# Patient Record
Sex: MALE | Race: WHITE | NOT HISPANIC OR LATINO | Employment: UNEMPLOYED | URBAN - METROPOLITAN AREA
[De-identification: names, ages, dates, MRNs, and addresses within clinical notes are randomized per-mention and may not be internally consistent; named-entity substitution may affect disease eponyms.]

---

## 2018-03-23 ENCOUNTER — HOSPITAL ENCOUNTER (OUTPATIENT)
Dept: SLEEP MEDICINE | Facility: HOSPITAL | Age: 63
Discharge: HOME | End: 2018-03-23
Attending: FAMILY MEDICINE
Payer: COMMERCIAL

## 2018-03-23 DIAGNOSIS — G47.33 OBSTRUCTIVE SLEEP APNEA (ADULT) (PEDIATRIC): ICD-10-CM

## 2018-03-23 PROCEDURE — 95806 SLEEP STUDY UNATT&RESP EFFT: CPT

## 2018-11-22 ENCOUNTER — HOSPITAL ENCOUNTER (OUTPATIENT)
Facility: HOSPITAL | Age: 63
Discharge: HOME | End: 2018-11-23
Attending: HOSPITALIST | Admitting: HOSPITALIST
Payer: COMMERCIAL

## 2018-11-22 ENCOUNTER — HOSPITAL ENCOUNTER (EMERGENCY)
Facility: HOSPITAL | Age: 63
Discharge: ANOTHER HOSPITAL | End: 2018-11-22
Attending: SURGERY
Payer: COMMERCIAL

## 2018-11-22 VITALS
BODY MASS INDEX: 37.76 KG/M2 | DIASTOLIC BLOOD PRESSURE: 86 MMHG | HEIGHT: 71 IN | SYSTOLIC BLOOD PRESSURE: 155 MMHG | RESPIRATION RATE: 23 BRPM | WEIGHT: 269.69 LBS | TEMPERATURE: 97 F | OXYGEN SATURATION: 96 % | HEART RATE: 99 BPM

## 2018-11-22 DIAGNOSIS — Z71.89 GOALS OF CARE, COUNSELING/DISCUSSION: ICD-10-CM

## 2018-11-22 DIAGNOSIS — Z71.89 COUNSELING REGARDING ADVANCED CARE PLANNING AND GOALS OF CARE: ICD-10-CM

## 2018-11-22 DIAGNOSIS — G45.9 TIA (TRANSIENT ISCHEMIC ATTACK): ICD-10-CM

## 2018-11-22 DIAGNOSIS — Z51.5 PALLIATIVE CARE ENCOUNTER: ICD-10-CM

## 2018-11-22 DIAGNOSIS — R47.1 DYSARTHRIA: ICD-10-CM

## 2018-11-22 DIAGNOSIS — R22.1 NECK MASS: ICD-10-CM

## 2018-11-22 DIAGNOSIS — E11.9 TYPE 2 DIABETES MELLITUS WITHOUT COMPLICATION, WITHOUT LONG-TERM CURRENT USE OF INSULIN: Primary | ICD-10-CM

## 2018-11-22 DIAGNOSIS — I67.1 ANTERIOR COMMUNICATING ARTERY ANEURYSM: ICD-10-CM

## 2018-11-22 PROBLEM — G45.1 TIA INVOLVING CAROTID ARTERY: Status: ACTIVE | Noted: 2018-11-22

## 2018-11-22 LAB
ALBUMIN SERPL BCP-MCNC: 4.2 G/DL
ALP SERPL-CCNC: 92 U/L
ALT SERPL W/O P-5'-P-CCNC: 47 U/L
ANION GAP SERPL CALC-SCNC: 15 MMOL/L
APTT BLDCRRT: 24.1 SEC
AST SERPL-CCNC: 30 U/L
BASOPHILS # BLD AUTO: 0.03 K/UL
BASOPHILS NFR BLD: 0.3 %
BILIRUB SERPL-MCNC: 1.9 MG/DL
BNP SERPL-MCNC: <10 PG/ML
BUN SERPL-MCNC: 21 MG/DL
CALCIUM SERPL-MCNC: 9.8 MG/DL
CHLORIDE SERPL-SCNC: 104 MMOL/L
CK MB SERPL-MCNC: 0.7 NG/ML
CK MB SERPL-RTO: 1.1 %
CK SERPL-CCNC: 63 U/L
CK SERPL-CCNC: 63 U/L
CO2 SERPL-SCNC: 24 MMOL/L
CREAT SERPL-MCNC: 1.4 MG/DL
D DIMER PPP IA.FEU-MCNC: 0.26 MG/L FEU
DIFFERENTIAL METHOD: ABNORMAL
EOSINOPHIL # BLD AUTO: 0.3 K/UL
EOSINOPHIL NFR BLD: 3.6 %
ERYTHROCYTE [DISTWIDTH] IN BLOOD BY AUTOMATED COUNT: 13.1 %
EST. GFR  (AFRICAN AMERICAN): >60 ML/MIN/1.73 M^2
EST. GFR  (NON AFRICAN AMERICAN): 54 ML/MIN/1.73 M^2
GLUCOSE SERPL-MCNC: 247 MG/DL
HCT VFR BLD AUTO: 41.8 %
HGB BLD-MCNC: 14.2 G/DL
INR PPP: 0.9
LYMPHOCYTES # BLD AUTO: 1.9 K/UL
LYMPHOCYTES NFR BLD: 21.1 %
MAGNESIUM SERPL-MCNC: 1.7 MG/DL
MCH RBC QN AUTO: 31.3 PG
MCHC RBC AUTO-ENTMCNC: 34 G/DL
MCV RBC AUTO: 92 FL
MONOCYTES # BLD AUTO: 0.8 K/UL
MONOCYTES NFR BLD: 8.9 %
NEUTROPHILS # BLD AUTO: 5.9 K/UL
NEUTROPHILS NFR BLD: 66.1 %
PHOSPHATE SERPL-MCNC: 3.1 MG/DL
PLATELET # BLD AUTO: 233 K/UL
PMV BLD AUTO: 9.8 FL
POTASSIUM SERPL-SCNC: 4 MMOL/L
PROT SERPL-MCNC: 7.2 G/DL
PROTHROMBIN TIME: 9.7 SEC
RBC # BLD AUTO: 4.53 M/UL
SODIUM SERPL-SCNC: 143 MMOL/L
TROPONIN I SERPL DL<=0.01 NG/ML-MCNC: <0.006 NG/ML
TSH SERPL DL<=0.005 MIU/L-ACNC: 2.75 UIU/ML
WBC # BLD AUTO: 8.87 K/UL

## 2018-11-22 PROCEDURE — 82553 CREATINE MB FRACTION: CPT

## 2018-11-22 PROCEDURE — 93010 ELECTROCARDIOGRAM REPORT: CPT | Mod: ,,, | Performed by: INTERNAL MEDICINE

## 2018-11-22 PROCEDURE — 63600175 PHARM REV CODE 636 W HCPCS: Performed by: SURGERY

## 2018-11-22 PROCEDURE — G0378 HOSPITAL OBSERVATION PER HR: HCPCS

## 2018-11-22 PROCEDURE — 84484 ASSAY OF TROPONIN QUANT: CPT

## 2018-11-22 PROCEDURE — 85025 COMPLETE CBC W/AUTO DIFF WBC: CPT

## 2018-11-22 PROCEDURE — 85730 THROMBOPLASTIN TIME PARTIAL: CPT

## 2018-11-22 PROCEDURE — 84443 ASSAY THYROID STIM HORMONE: CPT

## 2018-11-22 PROCEDURE — 80053 COMPREHEN METABOLIC PANEL: CPT

## 2018-11-22 PROCEDURE — 93005 ELECTROCARDIOGRAM TRACING: CPT

## 2018-11-22 PROCEDURE — 84100 ASSAY OF PHOSPHORUS: CPT

## 2018-11-22 PROCEDURE — 82550 ASSAY OF CK (CPK): CPT

## 2018-11-22 PROCEDURE — 96372 THER/PROPH/DIAG INJ SC/IM: CPT

## 2018-11-22 PROCEDURE — 85610 PROTHROMBIN TIME: CPT

## 2018-11-22 PROCEDURE — 83880 ASSAY OF NATRIURETIC PEPTIDE: CPT

## 2018-11-22 PROCEDURE — 99285 EMERGENCY DEPT VISIT HI MDM: CPT | Mod: 25

## 2018-11-22 PROCEDURE — G0426 INPT/ED TELECONSULT50: HCPCS | Mod: GT,,, | Performed by: PSYCHIATRY & NEUROLOGY

## 2018-11-22 PROCEDURE — 83735 ASSAY OF MAGNESIUM: CPT

## 2018-11-22 PROCEDURE — 85379 FIBRIN DEGRADATION QUANT: CPT

## 2018-11-22 PROCEDURE — 25000003 PHARM REV CODE 250: Performed by: SURGERY

## 2018-11-22 PROCEDURE — G0379 DIRECT REFER HOSPITAL OBSERV: HCPCS

## 2018-11-22 PROCEDURE — 36415 COLL VENOUS BLD VENIPUNCTURE: CPT

## 2018-11-22 RX ORDER — ENOXAPARIN SODIUM 100 MG/ML
40 INJECTION SUBCUTANEOUS
Status: COMPLETED | OUTPATIENT
Start: 2018-11-22 | End: 2018-11-22

## 2018-11-22 RX ORDER — ATORVASTATIN CALCIUM 40 MG/1
80 TABLET, FILM COATED ORAL
Status: COMPLETED | OUTPATIENT
Start: 2018-11-22 | End: 2018-11-22

## 2018-11-22 RX ORDER — CLOPIDOGREL 300 MG/1
300 TABLET, FILM COATED ORAL
Status: COMPLETED | OUTPATIENT
Start: 2018-11-22 | End: 2018-11-22

## 2018-11-22 RX ORDER — ACETAMINOPHEN 500 MG
1000 TABLET ORAL
Status: COMPLETED | OUTPATIENT
Start: 2018-11-22 | End: 2018-11-22

## 2018-11-22 RX ADMIN — ACETAMINOPHEN 1000 MG: 500 TABLET, FILM COATED ORAL at 09:11

## 2018-11-22 RX ADMIN — ENOXAPARIN SODIUM 40 MG: 100 INJECTION SUBCUTANEOUS at 10:11

## 2018-11-22 RX ADMIN — CLOPIDOGREL BISULFATE 300 MG: 300 TABLET, FILM COATED ORAL at 10:11

## 2018-11-22 RX ADMIN — ATORVASTATIN CALCIUM 80 MG: 40 TABLET, FILM COATED ORAL at 10:11

## 2018-11-23 VITALS
BODY MASS INDEX: 35.93 KG/M2 | SYSTOLIC BLOOD PRESSURE: 136 MMHG | TEMPERATURE: 96 F | DIASTOLIC BLOOD PRESSURE: 74 MMHG | HEART RATE: 84 BPM | RESPIRATION RATE: 18 BRPM | OXYGEN SATURATION: 96 % | WEIGHT: 256.63 LBS | HEIGHT: 71 IN

## 2018-11-23 PROBLEM — Z71.89 GOALS OF CARE, COUNSELING/DISCUSSION: Status: ACTIVE | Noted: 2018-11-23

## 2018-11-23 PROBLEM — I25.10 CAD (CORONARY ARTERY DISEASE): Status: ACTIVE | Noted: 2018-11-23

## 2018-11-23 PROBLEM — G45.9 TIA (TRANSIENT ISCHEMIC ATTACK): Status: RESOLVED | Noted: 2018-11-23 | Resolved: 2018-11-23

## 2018-11-23 PROBLEM — I67.1 ANTERIOR COMMUNICATING ARTERY ANEURYSM: Status: ACTIVE | Noted: 2018-11-23

## 2018-11-23 PROBLEM — Z51.5 PALLIATIVE CARE ENCOUNTER: Status: RESOLVED | Noted: 2018-11-23 | Resolved: 2018-11-23

## 2018-11-23 PROBLEM — R22.1 NECK MASS: Status: ACTIVE | Noted: 2018-11-23

## 2018-11-23 PROBLEM — Z71.89 GOALS OF CARE, COUNSELING/DISCUSSION: Status: RESOLVED | Noted: 2018-11-23 | Resolved: 2018-11-23

## 2018-11-23 PROBLEM — Z71.89 COUNSELING REGARDING ADVANCED CARE PLANNING AND GOALS OF CARE: Status: ACTIVE | Noted: 2018-11-23

## 2018-11-23 PROBLEM — G45.9 TIA (TRANSIENT ISCHEMIC ATTACK): Status: ACTIVE | Noted: 2018-11-23

## 2018-11-23 PROBLEM — Z51.5 PALLIATIVE CARE ENCOUNTER: Status: ACTIVE | Noted: 2018-11-23

## 2018-11-23 PROBLEM — Z71.89 COUNSELING REGARDING ADVANCED CARE PLANNING AND GOALS OF CARE: Status: RESOLVED | Noted: 2018-11-23 | Resolved: 2018-11-23

## 2018-11-23 PROBLEM — E11.9 TYPE 2 DIABETES MELLITUS, WITHOUT LONG-TERM CURRENT USE OF INSULIN: Status: ACTIVE | Noted: 2018-11-23

## 2018-11-23 PROBLEM — E78.5 HYPERLIPIDEMIA: Status: ACTIVE | Noted: 2018-11-23

## 2018-11-23 PROBLEM — I10 ESSENTIAL HYPERTENSION: Status: ACTIVE | Noted: 2018-11-23

## 2018-11-23 LAB
AORTIC ROOT ANNULUS: 3.61 CM
AORTIC VALVE CUSP SEPERATION: 2.1 CM
AV INDEX (PROSTH): 0.93
AV MEAN GRADIENT: 3.68 MMHG
AV PEAK GRADIENT: 6.25 MMHG
AV VALVE AREA: 2.79 CM2
BSA FOR ECHO PROCEDURE: 2.42 M2
CHOLEST SERPL-MCNC: 156 MG/DL
CHOLEST/HDLC SERPL: 3 {RATIO}
CV ECHO LV RWT: 0.45 CM
DOP CALC AO PEAK VEL: 1.25 M/S
DOP CALC AO VTI: 21.87 CM
DOP CALC LVOT AREA: 3.02 CM2
DOP CALC LVOT DIAMETER: 1.96 CM
DOP CALC LVOT STROKE VOLUME: 61.04 CM3
DOP CALCLVOT PEAK VEL VTI: 20.24 CM
E WAVE DECELERATION TIME: 206.64 MSEC
E/A RATIO: 0.73
ECHO LV POSTERIOR WALL: 1.02 CM (ref 0.6–1.1)
ESTIMATED AVG GLUCOSE: 186 MG/DL
FRACTIONAL SHORTENING: 29 % (ref 28–44)
HBA1C MFR BLD HPLC: 8.1 %
HDLC SERPL-MCNC: 52 MG/DL
HDLC SERPL: 33.3 %
INTERVENTRICULAR SEPTUM: 1.07 CM (ref 0.6–1.1)
LA MAJOR: 5.79 CM
LA MINOR: 5.96 CM
LA WIDTH: 3.91 CM
LDLC SERPL CALC-MCNC: 77.6 MG/DL
LEFT ATRIUM SIZE: 2.84 CM
LEFT ATRIUM VOLUME INDEX: 22.9 ML/M2
LEFT ATRIUM VOLUME: 55.44 CM3
LEFT INTERNAL DIMENSION IN SYSTOLE: 3.27 CM (ref 2.1–4)
LEFT VENTRICLE DIASTOLIC VOLUME INDEX: 39.86 ML/M2
LEFT VENTRICLE DIASTOLIC VOLUME: 96.45 ML
LEFT VENTRICLE MASS INDEX: 69.2 G/M2
LEFT VENTRICLE SYSTOLIC VOLUME INDEX: 17.9 ML/M2
LEFT VENTRICLE SYSTOLIC VOLUME: 43.26 ML
LEFT VENTRICULAR INTERNAL DIMENSION IN DIASTOLE: 4.58 CM (ref 3.5–6)
LEFT VENTRICULAR MASS: 167.56 G
LV LATERAL E/E' RATIO: 7.25
MV PEAK A VEL: 0.79 M/S
MV PEAK E VEL: 0.58 M/S
NONHDLC SERPL-MCNC: 104 MG/DL
PISA TR MAX VEL: 2.19 M/S
POCT GLUCOSE: 185 MG/DL (ref 70–110)
POCT GLUCOSE: 203 MG/DL (ref 70–110)
POCT GLUCOSE: 213 MG/DL (ref 70–110)
POCT GLUCOSE: 251 MG/DL (ref 70–110)
PULM VEIN S/D RATIO: 1.55
PV PEAK D VEL: 0.38 M/S
PV PEAK S VEL: 0.59 M/S
PV PEAK VELOCITY: 1.02 CM/S
RA MAJOR: 4.96 CM
RA PRESSURE: 3 MMHG
TDI LATERAL: 0.08
TR MAX PG: 19.18 MMHG
TRIGL SERPL-MCNC: 132 MG/DL
TROPONIN I SERPL DL<=0.01 NG/ML-MCNC: <0.006 NG/ML
TROPONIN I SERPL DL<=0.01 NG/ML-MCNC: <0.006 NG/ML
TV REST PULMONARY ARTERY PRESSURE: 22.18 MMHG

## 2018-11-23 PROCEDURE — 25000003 PHARM REV CODE 250: Performed by: NURSE PRACTITIONER

## 2018-11-23 PROCEDURE — G8980 MOBILITY D/C STATUS: HCPCS | Mod: CH

## 2018-11-23 PROCEDURE — 27000190 HC CPAP FULL FACE MASK W/VALVE

## 2018-11-23 PROCEDURE — 97161 PT EVAL LOW COMPLEX 20 MIN: CPT

## 2018-11-23 PROCEDURE — 97535 SELF CARE MNGMENT TRAINING: CPT

## 2018-11-23 PROCEDURE — G8987 SELF CARE CURRENT STATUS: HCPCS | Mod: CI

## 2018-11-23 PROCEDURE — G8979 MOBILITY GOAL STATUS: HCPCS | Mod: CH

## 2018-11-23 PROCEDURE — 94761 N-INVAS EAR/PLS OXIMETRY MLT: CPT

## 2018-11-23 PROCEDURE — 80061 LIPID PANEL: CPT

## 2018-11-23 PROCEDURE — 25000003 PHARM REV CODE 250: Performed by: HOSPITALIST

## 2018-11-23 PROCEDURE — G8978 MOBILITY CURRENT STATUS: HCPCS | Mod: CH

## 2018-11-23 PROCEDURE — G8989 SELF CARE D/C STATUS: HCPCS | Mod: CI

## 2018-11-23 PROCEDURE — 94660 CPAP INITIATION&MGMT: CPT

## 2018-11-23 PROCEDURE — 97165 OT EVAL LOW COMPLEX 30 MIN: CPT

## 2018-11-23 PROCEDURE — 63600175 PHARM REV CODE 636 W HCPCS: Performed by: HOSPITALIST

## 2018-11-23 PROCEDURE — 90472 IMMUNIZATION ADMIN EACH ADD: CPT | Performed by: HOSPITALIST

## 2018-11-23 PROCEDURE — 83036 HEMOGLOBIN GLYCOSYLATED A1C: CPT

## 2018-11-23 PROCEDURE — 99900039 HC SLP GENERIC THERAPY SCREENING (STAT)

## 2018-11-23 PROCEDURE — 99205 OFFICE O/P NEW HI 60 MIN: CPT | Mod: ,,, | Performed by: NURSE PRACTITIONER

## 2018-11-23 PROCEDURE — 90686 IIV4 VACC NO PRSV 0.5 ML IM: CPT | Performed by: HOSPITALIST

## 2018-11-23 PROCEDURE — 92610 EVALUATE SWALLOWING FUNCTION: CPT

## 2018-11-23 PROCEDURE — 84484 ASSAY OF TROPONIN QUANT: CPT | Mod: 91

## 2018-11-23 PROCEDURE — 36415 COLL VENOUS BLD VENIPUNCTURE: CPT

## 2018-11-23 PROCEDURE — A9585 GADOBUTROL INJECTION: HCPCS | Performed by: HOSPITALIST

## 2018-11-23 PROCEDURE — 90670 PCV13 VACCINE IM: CPT | Performed by: HOSPITALIST

## 2018-11-23 PROCEDURE — 90471 IMMUNIZATION ADMIN: CPT | Performed by: HOSPITALIST

## 2018-11-23 PROCEDURE — G8988 SELF CARE GOAL STATUS: HCPCS | Mod: CI

## 2018-11-23 PROCEDURE — G0378 HOSPITAL OBSERVATION PER HR: HCPCS

## 2018-11-23 PROCEDURE — 25500020 PHARM REV CODE 255: Performed by: HOSPITALIST

## 2018-11-23 RX ORDER — IBUPROFEN 200 MG
16 TABLET ORAL
Status: DISCONTINUED | OUTPATIENT
Start: 2018-11-23 | End: 2018-11-23 | Stop reason: HOSPADM

## 2018-11-23 RX ORDER — GLUCAGON 1 MG
1 KIT INJECTION
Status: DISCONTINUED | OUTPATIENT
Start: 2018-11-23 | End: 2018-11-23 | Stop reason: HOSPADM

## 2018-11-23 RX ORDER — CLOPIDOGREL BISULFATE 75 MG/1
75 TABLET ORAL DAILY
Status: DISCONTINUED | OUTPATIENT
Start: 2018-11-23 | End: 2018-11-23 | Stop reason: HOSPADM

## 2018-11-23 RX ORDER — ONDANSETRON 2 MG/ML
4 INJECTION INTRAMUSCULAR; INTRAVENOUS EVERY 8 HOURS PRN
Status: DISCONTINUED | OUTPATIENT
Start: 2018-11-23 | End: 2018-11-23 | Stop reason: HOSPADM

## 2018-11-23 RX ORDER — IBUPROFEN 200 MG
24 TABLET ORAL
Status: DISCONTINUED | OUTPATIENT
Start: 2018-11-23 | End: 2018-11-23 | Stop reason: HOSPADM

## 2018-11-23 RX ORDER — CLOPIDOGREL BISULFATE 75 MG/1
75 TABLET ORAL DAILY
Qty: 30 TABLET | Refills: 0 | Status: SHIPPED | OUTPATIENT
Start: 2018-11-23 | End: 2018-12-26

## 2018-11-23 RX ORDER — GADOBUTROL 604.72 MG/ML
10 INJECTION INTRAVENOUS
Status: COMPLETED | OUTPATIENT
Start: 2018-11-23 | End: 2018-11-23

## 2018-11-23 RX ORDER — ENOXAPARIN SODIUM 100 MG/ML
40 INJECTION SUBCUTANEOUS EVERY 24 HOURS
Status: DISCONTINUED | OUTPATIENT
Start: 2018-11-23 | End: 2018-11-23 | Stop reason: HOSPADM

## 2018-11-23 RX ORDER — ASPIRIN 325 MG
325 TABLET ORAL DAILY
Status: DISCONTINUED | OUTPATIENT
Start: 2018-11-23 | End: 2018-11-23 | Stop reason: HOSPADM

## 2018-11-23 RX ORDER — SODIUM CHLORIDE 0.9 % (FLUSH) 0.9 %
5 SYRINGE (ML) INJECTION
Status: DISCONTINUED | OUTPATIENT
Start: 2018-11-23 | End: 2018-11-23 | Stop reason: HOSPADM

## 2018-11-23 RX ORDER — ACETAMINOPHEN 325 MG/1
650 TABLET ORAL EVERY 6 HOURS PRN
Status: DISCONTINUED | OUTPATIENT
Start: 2018-11-23 | End: 2018-11-23 | Stop reason: HOSPADM

## 2018-11-23 RX ORDER — ATORVASTATIN CALCIUM 40 MG/1
80 TABLET, FILM COATED ORAL DAILY
Status: DISCONTINUED | OUTPATIENT
Start: 2018-11-23 | End: 2018-11-23 | Stop reason: HOSPADM

## 2018-11-23 RX ADMIN — PNEUMOCOCCAL 13-VALENT CONJUGATE VACCINE 0.5 ML: 2.2; 2.2; 2.2; 2.2; 2.2; 4.4; 2.2; 2.2; 2.2; 2.2; 2.2; 2.2; 2.2 INJECTION, SUSPENSION INTRAMUSCULAR at 05:11

## 2018-11-23 RX ADMIN — ATORVASTATIN CALCIUM 80 MG: 40 TABLET, FILM COATED ORAL at 03:11

## 2018-11-23 RX ADMIN — INFLUENZA A VIRUS A/MICHIGAN/45/2015 X-275 (H1N1) ANTIGEN (FORMALDEHYDE INACTIVATED), INFLUENZA A VIRUS A/SINGAPORE/INFIMH-16-0019/2016 IVR-186 (H3N2) ANTIGEN (FORMALDEHYDE INACTIVATED), INFLUENZA B VIRUS B/PHUKET/3073/2013 ANTIGEN (FORMALDEHYDE INACTIVATED), AND INFLUENZA B VIRUS B/MARYLAND/15/2016 BX-69A ANTIGEN (FORMALDEHYDE INACTIVATED) 0.5 ML: 15; 15; 15; 15 INJECTION, SUSPENSION INTRAMUSCULAR at 05:11

## 2018-11-23 RX ADMIN — CLOPIDOGREL BISULFATE 75 MG: 75 TABLET ORAL at 04:11

## 2018-11-23 RX ADMIN — ASPIRIN 325 MG ORAL TABLET 325 MG: 325 PILL ORAL at 03:11

## 2018-11-23 RX ADMIN — GADOBUTROL 10 ML: 604.72 INJECTION INTRAVENOUS at 01:11

## 2018-11-23 NOTE — PLAN OF CARE
Problem: Physical Therapy Goal  Goal: Physical Therapy Goal  Goals to be met by: 11/23/2018     No PT goals established        Outcome: Outcome(s) achieved Date Met: 11/23/18  Patient independent with gait and all transitional movements  No acute skilled PT needs   Recommend Home   Will DC PT service at this time

## 2018-11-23 NOTE — SUBJECTIVE & OBJECTIVE
Woke up with symptoms?: no  Last known normal:        Recent bleeding noted: no  Does the patient take any Blood Thinners? no  Medications: Antiplatelets:  aspirin      Past Medical History: hypertension, diabetes, hyperlipidemia and LAST WEEK dx with possible malignancy, mass R side of neck suspicious for cancer.  Bx done, result s not available yet.     Past Surgical History: no major surgeries within the last 2 weeks    Family History: no relevant history    Social History: no smoking, no drinking, no drugs    Allergies: No Known Allergies No relevant allergies    Review of Systems   Constitutional: Negative for appetite change, fatigue, fever and unexpected weight change.   HENT: Negative for congestion, hearing loss, nosebleeds, sinus pressure and trouble swallowing.    Eyes: Negative for pain and visual disturbance.   Respiratory: Negative for cough, shortness of breath and wheezing.    Cardiovascular: Negative for chest pain and palpitations.   Gastrointestinal: Negative for abdominal pain, blood in stool, diarrhea, nausea and vomiting.   Endocrine: Negative for cold intolerance and heat intolerance.   Genitourinary: Negative for difficulty urinating, hematuria and urgency.   Musculoskeletal: Negative for arthralgias, back pain, gait problem, myalgias and neck pain.   Skin: Negative for rash and wound.   Neurological: Positive for dizziness, speech difficulty, weakness (generalized) and headaches. Negative for seizures and numbness.   Hematological: Does not bruise/bleed easily.   Psychiatric/Behavioral: Negative for decreased concentration, dysphoric mood and sleep disturbance.     Objective:   Vitals: Blood pressure 129/88, pulse (!) 111, temperature 97.3 °F (36.3 °C), temperature source Oral, resp. rate (!) 26, weight 122.3 kg (269 lb 11.2 oz), SpO2 96 %.     CT READ: Yes  No hemmorhage. No mass effect. No early infarct signs.     Physical Exam   Constitutional: He appears well-developed and  well-nourished.   HENT:   Head: Normocephalic and atraumatic.   Cardiovascular: Normal rate and regular rhythm.   Pulmonary/Chest: Effort normal.   Psychiatric: He has a normal mood and affect. His behavior is normal. Judgment and thought content normal.   Vitals reviewed.

## 2018-11-23 NOTE — PLAN OF CARE
Problem: Occupational Therapy Goal  Goal: Occupational Therapy Goal  Outcome: Outcome(s) achieved Date Met: 11/23/18  Pt performing at baseline for ADLs and functional mobility. BUE ROM/MMT/FMC/GMC/sensation all WFL. Pt reports no visual deficits.  Pt does report that he is having some deficits expressing self.    Pt does not require further OT services at this time. D/c OT.

## 2018-11-23 NOTE — PT/OT/SLP EVAL
"Speech Language Pathology Evaluation  Bedside Swallow and Informal Speech Screen    Patient Name:  Jerrod Mendez   MRN:  [id number]  Admitting Diagnosis: TIA (transient ischemic attack)    Recommendations:                 General Recommendations:  OP speech therapy  Diet recommendations:  Regular, Thin   Aspiration Precautions: Standard aspiration precautions   General Precautions: Standard, fall  Communication strategies:  none    History:     Past Medical History:   Diagnosis Date    Cancer     Diabetes mellitus     High cholesterol     Hypertension        Past Surgical History:   Procedure Laterality Date    CHOLECYSTECTOMY      CORONARY ANGIOPLASTY WITH STENT PLACEMENT      HEART CATH-LEFT Right 5/22/2015    Performed by Jose Mendez MD at [facility] CATH LAB       HPI: Jerrod Mendez is a 64 yo male with HTN, HLD, CAD s/p PCI (2015), Type 2 Diabetes Mellitus, JIM utilizes nightly CPAP, hx of tobacco use (quit 8 years ago) and right neck mass with recently diagnosed tonsillar CA. He presented to [facility] ED with acute onset of expressive aphasia and right lower extremity weakness. The patient reports onset of symptoms at 8 pm. He was playing cards with family at the time when he noted speech became very slurred with inability "to get words out." The patient reports associated dizziness, flushed sensation, pallor, blurred vision, substernal chest tightness and nausea without vomiting. The patient states he attempted to walk and felt like he was dragging his right leg. He denies syncopal event. He reports chest tightness resolved with SL nitro. No prior similar episodes. He states symptoms have improved, but not yet resolved. CT brain negative for acute findings. CXR negative, NSR on telemetry. Pt vitals stable. Pt evaluated  by vascular neurology Dr. Omer via tele stroke. He received 325 mg ASA, simvastatin and Lovenox prior to arrival. Pt admitted to [facility] for further evaluation and treatment. " "His PCP is Dr. Pieter Velazquez.       Social History: Patient lives with his wife in [place], [place].    Prior Intubation HX:  N/A    Modified Barium Swallow: None on file    Chest X-Rays: None on file    MRI Brain W WO Contrast:   Impression:       No acute intracranial abnormality.    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  Otherwise, no arterial focal high-grade stenosis or occlusion.    This report was flagged in Epic as abnormal.       Prior diet: Per pt, regular/thin liquids PO diet.      Subjective     Pt participated in clinical swallow eval and informal speech screen. SLP confirmed with RN prior to entry. Pt found in bed awake, alert and with pt's wife at bedside. Pt agreeable to participate in skilled ST session. Pt recently dx with tonsillar CA and prior to dx pt reported intermittent globus sensation s/p swallow for ~ 2 wks.     Patient goals: "I feel better than yesterday, but I feel like my speech is still a little off" per pt    Pain/Comfort:  · Pain Rating 1: 0/10    Objective:   Pt participated in clinical swallow eval this PM. Pt tolerated thin liquids, puree, and hard solid textures with no overt s/s of aspiration, at bedside. Pt also participated in informal speech screen to determine if further eval is required to r/o cognitive-linguistic deficits--per informal speech screen, pt demonstrates baseline cognitive-linguistic skills.     Oral Musculature Evaluation  · Oral Musculature: (pt observed with bilateral swelling at jaw/tonsil region, which pt and pt's spouse report is d/t masses from tonsillar CA)  · Dentition: present and adequate  · Mucosal Quality: adequate  · Mandibular Strength and Mobility: WFL  · Oral Labial Strength and Mobility: WFL  · Lingual Strength and Mobility: WFL  · Buccal Strength and Mobility: WFL  · Volitional Swallow: (timely upon palpation)  · Voice Prior to PO Intake: (low volume, clear)    Bedside Swallow Eval:    Consistencies Assessed:  · Thin liquids -via cup sips x5, straw " x4  · Puree -(pudding) x3  · Solids -(argenis crackers) x3     Oral Phase:   · WFL    Pharyngeal Phase:   · no overt clinical signs/symptoms of aspiration  · no overt clinical signs/symptoms of pharyngeal dysphagia  · throat clearing-- s/p swallow pudding x1    Compensatory Strategies  · Volitional cough/throat clear-- s/p swallow pudding x1    Speech-Language Screening:  -Pt oriented to name, , situation and time  -Pt demonstrated good STM skills, as pt able to independently recall 3/3 unrelated words given by SLP with 1 and 3 min delay during delayed memory recall task  -Pt demonstrated receptive and expressive language skills to be judged WFL-- pt able to follow 2-3 step commands, identify items within room, and demonstrated appropriate sentence formulation during conversations.  -Pt also able to name 10/10 items within in room and given body parts, as well as name 15 items within given category during 1 min time constraint-- Norm is 15-20 during 1 min.   -Pt able to recall PmHx and state reasoning for current admission here at [facility]  -Pt also participated in diadochokinetics task (/pa/, /ta/, /ka/) and demonstrated motor speech skills to be judged WFL   -Overall, pt demonstrates baseline speech-language and cognitive skills.      Treatment: Skilled ST services at level of acute care no longer required as pt has met all ST goals. However, pt would benefit from Outpatient Speech Therapy Consultation prior to initiation of radiation/chemotherapy to address potential dysphagia and dysphonia d/t CA treatment.     Self-Care/Education: SLP educated pt and pt's wife on role of SLP, clinical swallow eval, diet recs/swallow precautions, speech screen/results and POC.  - SLP provided extensive education to pt and pt's wife regarding effects of radiation/chemotherapy on pt's vocal cords/vocal quality, swallowing mechanism and all musculature (anatomy and physiology) involved in those processes. SLP also informed pt  and pt's wife on risks associated with given effects (difficulty swallowing, dcr'd appetite, and change in vocal quality, etc.). SLP rec'd to pt and pt's wife that pt would benefit from OP Speech therapy consult prior to radiation/chemotherapy for pre-intervention of possible dysphagia and dysphonia. SLP answered all questions/concerns presented by pt and pt's wife, within SLP's scope of practice.  Pt and pt's wife acknowledged and confirmed understanding, as well as demonstrated understanding, via teach back method.      Assessment:     Jerrod Mendez is a 63 y.o. male admitted with TIA (transient ischemic attack).  He presents with no overt s/s of aspiration during the swallow across all consistencies, per subjective observation, as well as baseline cognitive-linguistic skills.   SLP recs: Regular/thin liquids  diet with universal swallow precautions.  Skilled ST services at level of acute care no longer required as pt has met all ST goals. However, pt would benefit from OP speech therapy consult prior to initiation of radiation/chemotherapy for tonsillar CA for pre-intervention of possible dysphagia and dysphonia d/t CA treatment. SLP notified MARY ELLEN Enriquez of results/recs. PA in agreement with SLP recs.      Goals:   Multidisciplinary Problems     SLP Goals     Not on file          Multidisciplinary Problems (Resolved)        Problem: SLP Goal    Goal Priority Disciplines Outcome   SLP Goal   (Resolved)     SLP Outcome(s) achieved   Description:  Short Term Goals:  1. Pt will participate in BSS to determine least restrictive diet.- MET 11/23  2. Pt will participate in informal speech-lang eval to r/o cognitive-linguistic deficits. MET 11/23                      Plan:     · Plan of Care reviewed with:  patient, spouse(MARY ELLEN Enriquez and REINA Moreno)   · SLP Follow-Up:  No       Discharge recommendations:  outpatient speech therapy(for initiation of preintervention of potential dysphagia and dysphonia d/t  radiation/chemotherapy for tonsillar CA)       Time Tracking:     SLP Treatment Date:   11/23/18  Speech Start Time:  1338  Speech Stop Time:  1408     Speech Total Time (min):  30 min    Billable Minutes: Eval Swallow and Oral Function 17 and Seld Care/Home Management Training 13    FLORENCIA Jonas, CCC-SLP  11/23/2018

## 2018-11-23 NOTE — ASSESSMENT & PLAN NOTE
History concerning for possible left MCA distribution TIA / mild stroke vs brainstem event.  D/Dx also includes cardiac arrhythmia, presyncope, tumor involvement.  Bx results indicate squamous cell CA. Possibly hypercoagulable state.     Antithrombotics for secondary stroke prevention: Antiplatelets: Clopidogrel: 300 mg loading dose x 1, now    Statins for secondary stroke prevention and hyperlipidemia, if present:   Statins: Atorvastatin- 80 mg daily    Aggressive risk factor modification: HTN, DM, HLD, CAD     Rehab efforts: PT/OT/SLP to evaluate and treat    Diagnostics ordered/pending: Carotid ultrasound to assess vasculature, HgbA1C to assess blood glucose levels, Lipid Profile to assess cholesterol levels, MRA head to assess vasculature, MRA neck/arch to assess vasculature, MRI head without contrast to assess brain parenchyma, TTE to assess cardiac function/status , TSH to assess thyroid function.  Consider imaging studies soft tissue neck to assess for external vascular compression, and MRI brain +/- contrast for possible metastatic disease.     VTE prophylaxis: Enoxaparin 40 mg SQ every 24 hours    BP parameters: TIA: SBP <220 until imaging confirmation of no infarct

## 2018-11-23 NOTE — ASSESSMENT & PLAN NOTE
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 Pt reports plans to start radiation. Continue outpatient management.

## 2018-11-23 NOTE — ASSESSMENT & PLAN NOTE
64 yo male with hx of HTN, HLD, CAD and T2DM presents with report of acute onset of expressive aphasia and right lower extremity weakness. Onset 8 pm. No acute findings on CT Brain. Pt evaluated by neuro per tele stroke. Pt received 325 ASA, 300 mg plavix,  Lovenox and statin. No neuro deficits noted on exam.    --check lipid panel, A1C   --MRI brain   --MRA Head/Neck   --TTE   --neuro checks q 4h   --consult PT/OT/SLP   --fall/aspiration precautions  --continue asa, statin

## 2018-11-23 NOTE — HPI
"Jerrod Mendez is a 62 yo male with HTN, HLD, CAD s/p PCI (2015), Type 2 Diabetes Mellitus, JIM utilizes nightly CPAP, hx of tobacco use (quit 8 years ago) and right neck mass with recently diagnosed tonsillar CA. He presented to [facility] ED with acute onset of expressive aphasia and right lower extremity weakness. The patient reports onset of symptoms at 8 pm. He was playing cards with family at the time when he noted speech became very slurred with inability "to get words out." The patient reports associated dizziness, flushed sensation, pallor, blurred vision, substernal chest tightness and nausea without vomiting. The patient states he attempted to walk and felt like he was dragging his right leg. He denies syncopal event. He reports chest tightness resolved with SL nitro. No prior similar episodes. He states symptoms have improved, but not yet resolved. CT brain negative for acute findings. CXR negative, NSR on telemetry. Pt vitals stable. Pt evaluated  by vascular neurology Dr. Omer via tele stroke. He received 325 mg ASA, simvastatin and Lovenox prior to arrival. Pt admitted to [facility] for further evaluation and treatment. His PCP is Dr. Pieter Velazquez.  "

## 2018-11-23 NOTE — HPI
62 yo RH man, playing cards with friends family after dinner, suddenly became flushed, then pale, eyes not focused.  Unable to speak except for very hoarse, halting speech..  Reported feeling dizzy, spinning sensation.  Tried to walk and felt like he was dragging, weak on the right side and had difficulty communicating on phone.  Speech is a little better but still not quite normal.  No prior similar episodes. + Hx CAD, stenting. Takes ASA, HTN meds, statin

## 2018-11-23 NOTE — ASSESSMENT & PLAN NOTE
A1C 8.1     --Hold po hypoglycemics  --accu-check AC&HS, diabetic diet, SSI, hypoglycemic protocol

## 2018-11-23 NOTE — ED TRIAGE NOTES
Family reports patient became dizzy and aphasic 1 hour PTA. Patient currently AAO, able to follow commands.

## 2018-11-23 NOTE — SUBJECTIVE & OBJECTIVE
Past Medical History:   Diagnosis Date    Cancer     Diabetes mellitus     High cholesterol     Hypertension        Past Surgical History:   Procedure Laterality Date    CHOLECYSTECTOMY      CORONARY ANGIOPLASTY WITH STENT PLACEMENT      HEART CATH-LEFT Right 5/22/2015    Performed by Jose Mendez MD at [facility] CATH LAB       Review of patient's allergies indicates:  Not on File    Current Facility-Administered Medications on File Prior to Encounter   Medication    [COMPLETED] acetaminophen tablet 1,000 mg    [COMPLETED] atorvastatin tablet 80 mg    [COMPLETED] clopidogrel tablet 300 mg    [COMPLETED] enoxaparin injection 40 mg     Current Outpatient Medications on File Prior to Encounter   Medication Sig    aspirin 81 MG Chew Take 81 mg by mouth once daily.    carvedilol (COREG) 6.25 MG tablet Take half tab a day for 1 week then take 1 tab daily thereafter    glipiZIDE (GLUCOTROL) 5 MG tablet Take 5 mg by mouth 2 (two) times daily before meals.    hydrocodone-acetaminophen 5-325mg (NORCO) 5-325 mg per tablet Take 1 tablet by mouth every 6 (six) hours as needed for Pain.    lisinopril (PRINIVIL,ZESTRIL) 5 MG tablet Take 5 mg by mouth once daily.    metformin (GLUCOPHAGE) 1000 MG tablet Take 1,000 mg by mouth 2 (two) times daily with meals.    nitroGLYCERIN (NITROSTAT) 0.3 MG SL tablet Place 0.3 mg under the tongue every 5 (five) minutes as needed for Chest pain.    simvastatin (ZOCOR) 40 MG tablet Take 40 mg by mouth every evening.    meclizine (ANTIVERT) 25 mg tablet Take 25 mg by mouth 3 (three) times daily as needed.     Family History     None        Tobacco Use    Smoking status: Former Smoker     Types: Cigarettes    Smokeless tobacco: Former User    Tobacco comment: Quit 8 years ago   Substance and Sexual Activity    Alcohol use: Yes     Comment: socially    Drug use: No    Sexual activity: Not on file     Review of Systems   Constitutional: Negative for chills, diaphoresis and  fever.   HENT: Positive for congestion.    Eyes: Positive for visual disturbance.   Respiratory: Negative for cough, chest tightness, shortness of breath and wheezing.    Cardiovascular: Negative for chest pain, palpitations and leg swelling.   Gastrointestinal: Negative for abdominal pain, diarrhea, nausea and vomiting.   Genitourinary: Negative for dysuria, flank pain, frequency and hematuria.   Musculoskeletal: Negative for back pain and myalgias.   Neurological: Positive for dizziness and weakness (Right lower extremity ). Negative for syncope, light-headedness and headaches.   Psychiatric/Behavioral: Negative for confusion.     Objective:     Vital Signs (Most Recent):  Temp: 96.5 °F (35.8 °C) (11/23/18 0004)  Pulse: 89 (11/23/18 0028)  Resp: 20 (11/23/18 0004)  BP: (!) 140/92 (11/23/18 0004)  SpO2: 96 % (11/23/18 0004) Vital Signs (24h Range):  Temp:  [96.5 °F (35.8 °C)-97.3 °F (36.3 °C)] 96.5 °F (35.8 °C)  Pulse:  [] 89  Resp:  [18-26] 20  SpO2:  [94 %-97 %] 96 %  BP: (112-157)/(81-98) 140/92     Weight: 116.5 kg (256 lb 13.4 oz)  Body mass index is 35.82 kg/m².    Physical Exam   Constitutional: He is oriented to person, place, and time. He appears well-developed and well-nourished. No distress.   HENT:   Head: Normocephalic and atraumatic.   Eyes: Conjunctivae are normal. Pupils are equal, round, and reactive to light.   Neck: Normal range of motion. No JVD present.   + right neck mass, mild tenderness to palpation    Cardiovascular: Normal rate, regular rhythm, normal heart sounds and intact distal pulses.   Pulmonary/Chest: Effort normal and breath sounds normal. No respiratory distress. He has no wheezes.   Abdominal: Soft. Bowel sounds are normal. He exhibits no distension. There is no tenderness. There is no guarding.   Musculoskeletal: Normal range of motion. He exhibits no edema or tenderness.   Neurological: He is alert and oriented to person, place, and time. No cranial nerve deficit or  sensory deficit.   Skin: Skin is warm and dry. Capillary refill takes less than 2 seconds. No erythema.   Psychiatric: He has a normal mood and affect. His behavior is normal.         CRANIAL NERVES     CN III, IV, VI   Pupils are equal, round, and reactive to light.       Significant Labs:   BMP:   Recent Labs   Lab 11/22/18 2103   *      K 4.0      CO2 24   BUN 21   CREATININE 1.4   CALCIUM 9.8   MG 1.7     CBC:   Recent Labs   Lab 11/22/18 2103   WBC 8.87   HGB 14.2   HCT 41.8        Troponin:   Recent Labs   Lab 11/22/18 2103   TROPONINI <0.006     TSH:   Recent Labs   Lab 11/22/18 2103   TSH 2.751       Significant Imaging: I have reviewed all pertinent imaging results/findings within the past 24 hours.

## 2018-11-23 NOTE — HOSPITAL COURSE
Troponin was trended and remained negative. Lipid panel and TSH were within normal limits. A1C was 8.1. TTE showed EF of 55%, grade 1 diastolic dysfunction, mild aortic regurgitation. 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 PT and OT found patient to be at his baseline with no further needs. SLP recommended outpatient ST prior to beginning radiation for his neck mass. Symptoms resolved. Hemodynamically stable and ready for discharge. Patient was discharged to home with outpatient referrals to Speech Therapy and Neurosurgery for evaluation of newly diagnosed aneurysm. He was given plavix 75 mg PO daily x 30 days for secondary stroke prevention. He will continue to follow-up at [facility] for treatment of malignant neck mass.

## 2018-11-23 NOTE — PLAN OF CARE
"Outside Transfer Acceptance Note    Transferring Physician or Mid Level Provider/Speciality: Dr. Perkins / ED     Accepting Physician: Ivon Sofia     Date of Acceptance: 11/22/2018     Code Status: Full    Transferring Facility/Hospital: [facility]     [place] [facility] [place]urology [place] for [place]IA     Report from Transferring Physician or Mid-Level provider/Hospital course:     Patient is a 63 year old male with PMH of HTN, HLD, DM2, CAD, recent diagnosis of tonsillar cancer with squamous cell carcinoma presented to ED with expressive aphasia and R leg weakness. Symptoms started around 8 pm while playing cards with family members after thanksgiving dinner. His symptoms almost resolved soon after arrival to ED. CT head negative for bleed or other acute process. EKG showed NSR.  Patient was evaluated by Dr. Omer from vascular neurology via tele stroke and  thought patient likely has TIA.  Patient will be transferred to [facility] for MRI brain and neurology evaluation.     /86   Pulse 100   Temp 97.3 °F (36.3 °C) (Oral)   Resp 18   Ht 5' 11" (1.803 m)   Wt 122.3 kg (269 lb 11.2 oz)   SpO2 95%   BMI 37.62 kg/m²     Recent Results (from the past 24 hour(s))   Comprehensive metabolic panel    Collection Time: 11/22/18  9:03 PM   Result Value Ref Range    Sodium 143 136 - 145 mmol/L    Potassium 4.0 3.5 - 5.1 mmol/L    Chloride 104 95 - 110 mmol/L    CO2 24 23 - 29 mmol/L    Glucose 247 (H) 70 - 110 mg/dL    BUN, Bld 21 8 - 23 mg/dL    Creatinine 1.4 0.5 - 1.4 mg/dL    Calcium 9.8 8.7 - 10.5 mg/dL    Total Protein 7.2 6.0 - 8.4 g/dL    Albumin 4.2 3.5 - 5.2 g/dL    Total Bilirubin 1.9 (H) 0.1 - 1.0 mg/dL    Alkaline Phosphatase 92 55 - 135 U/L    AST 30 10 - 40 U/L    ALT 47 (H) 10 - 44 U/L    Anion Gap 15 8 - 16 mmol/L    eGFR if African American >60 >60 mL/min/1.73 m^2    eGFR if non African American 54 (A) >60 mL/min/1.73 m^2   CBC auto differential    Collection Time: 11/22/18  " 9:03 PM   Result Value Ref Range    WBC 8.87 3.90 - 12.70 K/uL    RBC 4.53 (L) 4.60 - 6.20 M/uL    Hemoglobin 14.2 14.0 - 18.0 g/dL    Hematocrit 41.8 40.0 - 54.0 %    MCV 92 82 - 98 fL    MCH 31.3 (H) 27.0 - 31.0 pg    MCHC 34.0 32.0 - 36.0 g/dL    RDW 13.1 11.5 - 14.5 %    Platelets 233 150 - 350 K/uL    MPV 9.8 9.2 - 12.9 fL    Gran # (ANC) 5.9 1.8 - 7.7 K/uL    Lymph # 1.9 1.0 - 4.8 K/uL    Mono # 0.8 0.3 - 1.0 K/uL    Eos # 0.3 0.0 - 0.5 K/uL    Baso # 0.03 0.00 - 0.20 K/uL    Gran% 66.1 38.0 - 73.0 %    Lymph% 21.1 18.0 - 48.0 %    Mono% 8.9 4.0 - 15.0 %    Eosinophil% 3.6 0.0 - 8.0 %    Basophil% 0.3 0.0 - 1.9 %    Differential Method Automated    CK    Collection Time: 11/22/18  9:03 PM   Result Value Ref Range    CPK 63 20 - 200 U/L   CK-MB    Collection Time: 11/22/18  9:03 PM   Result Value Ref Range    CPK 63 20 - 200 U/L    CPK MB 0.7 0.1 - 6.5 ng/mL    MB% 1.1 0.0 - 5.0 %   Troponin I    Collection Time: 11/22/18  9:03 PM   Result Value Ref Range    Troponin I <0.006 0.000 - 0.026 ng/mL   Brain natriuretic peptide    Collection Time: 11/22/18  9:03 PM   Result Value Ref Range    BNP <10 0 - 99 pg/mL   Protime-INR    Collection Time: 11/22/18  9:03 PM   Result Value Ref Range    Prothrombin Time 9.7 9.0 - 12.5 sec    INR 0.9 0.8 - 1.2   APTT    Collection Time: 11/22/18  9:03 PM   Result Value Ref Range    aPTT 24.1 21.0 - 32.0 sec   D dimer, quantitative    Collection Time: 11/22/18  9:03 PM   Result Value Ref Range    D-Dimer 0.26 <0.50 mg/L FEU   TSH    Collection Time: 11/22/18  9:03 PM   Result Value Ref Range    TSH 2.751 0.400 - 4.000 uIU/mL   Magnesium    Collection Time: 11/22/18  9:03 PM   Result Value Ref Range    Magnesium 1.7 1.6 - 2.6 mg/dL   Phosphorus    Collection Time: 11/22/18  9:03 PM   Result Value Ref Range    Phosphorus 3.1 2.7 - 4.5 mg/dL       I have discussed the case with Dr. Guevara from [facility].     To do list upon patient arrival:   - admit to med telemetry unit   - neuro  check q4 hrs   - MRI brain to assess brain parenchyma   -MRA head and neck to assess vasculature   -2D ECHO with CFD to assess LV function   - check A1C, lipid panel, TSH   - consult neurology     Ivon Sofia DO  - [facility]   Attending Staff Physician   [employer]

## 2018-11-23 NOTE — ASSESSMENT & PLAN NOTE
Chronic. -157. Pt takes lisinopril 5 mg po daily and coreg  6.25 mg daily     --hold antihypertensives for   --allow permissive  Hypertension

## 2018-11-23 NOTE — PT/OT/SLP EVAL
Physical Therapy Evaluation and Discharge Note    Patient Name:  Jerrod Mendez   MRN:  [id number]    Recommendations:     Discharge Recommendations:  home   Discharge Equipment Recommendations: none   Barriers to discharge: None    Assessment:     Jrerod Mendez is a 63 y.o. male admitted with a medical diagnosis of TIA (transient ischemic attack). .  At this time, patient is functioning at their prior level of function and does not require further acute PT services.     Recent Surgery: * No surgery found *      Plan:     During this hospitalization, patient does not require further acute PT services.  Please re-consult if situation changes.      Subjective     Chief Complaint: voiced not complaints  Patient/Family Comments/goals: go home  Pain/Comfort:  · Pain Rating 1: 0/10  · Pain Rating Post-Intervention 1: 0/10    Patients cultural, spiritual, Jehovah's witness conflicts given the current situation:      Living Environment:  Lives with spouse no concerns  Prior to admission, patients level of function was independent.  Equipment used at home: none.  DME owned (not currently used): none.  Upon discharge, patient will have assistance from family .    Objective:     Communicated with primary nurse prior to session.  Patient found supine  upon PT entry to room found with:       General Precautions: Standard, fall   Orthopedic Precautions:N/A   Braces: N/A     Exams:  · RLE ROM: WFL  · RLE Strength: WFL  · LLE ROM: WFL  · LLE Strength: WFL    Functional Mobility:  · Bed Mobility:     · Supine to Sit: modified independence  · Sit to Supine: modified independence  · Transfers:     · Sit to Stand:  modified independence with no AD  · Gait: Mod I no AD  · Balance: good    AM-PAC 6 CLICK MOBILITY  Total Score:24       Therapeutic Activities and Exercises:   na    AM-PAC 6 CLICK MOBILITY  Total Score:24     Patient left HOB elevated with call button in reach and bed alarm on.    GOALS:   Multidisciplinary Problems     Physical  Therapy Goals     Not on file          Multidisciplinary Problems (Resolved)        Problem: Physical Therapy Goal    Goal Priority Disciplines Outcome Goal Variances Interventions   Physical Therapy Goal   (Resolved)     PT, PT/OT Outcome(s) achieved     Description:  Goals to be met by: 11/23/2018     No PT goals established                          History:     Past Medical History:   Diagnosis Date    Cancer     Diabetes mellitus     High cholesterol     Hypertension        Past Surgical History:   Procedure Laterality Date    CHOLECYSTECTOMY      CORONARY ANGIOPLASTY WITH STENT PLACEMENT      HEART CATH-LEFT Right 5/22/2015    Performed by Jose Mendez MD at [facility] CATH LAB       Clinical Decision Making:     History  Co-morbidities and personal factors that may impact the plan of care Examination  Body Structures and Functions, activity limitations and participation restrictions that may impact the plan of care Clinical Presentation   Decision Making/ Complexity Score   Co-morbidities:   [] Time since onset of injury / illness / exacerbation  [] Status of current condition  []Patient's cognitive status and safety concerns    [] Multiple Medical Problems (see med hx)  Personal Factors:   [] Patient's age  [] Prior Level of function   [] Patient's home situation (environment and family support)  [] Patient's level of motivation  [] Expected progression of patient      HISTORY:(criteria)    [x] 74267 - no personal factors/history    [] 40539 - has 1-2 personal factor/comorbidity     [] 15020 - has >3 personal factor/comorbidity     Body Regions:  [] Objective examination findings  [] Head     []  Neck  [] Trunk   [] Upper Extremity  [] Lower Extremity    Body Systems:  [] For communication ability, affect, cognition, language, and learning style: the assessment of the ability to make needs known, consciousness, orientation (person, place, and time), expected emotional /behavioral responses, and  learning preferences (eg, learning barriers, education  needs)  [] For the neuromuscular system: a general assessment of gross coordinated movement (eg, balance, gait, locomotion, transfers, and transitions) and motor function  (motor control and motor learning)  [] For the musculoskeletal system: the assessment of gross symmetry, gross range of motion, gross strength, height, and weight  [] For the integumentary system: the assessment of pliability(texture), presence of scar formation, skin color, and skin integrity  [] For cardiovascular/pulmonary system: the assessment of heart rate, respiratory rate, blood pressure, and edema     Activity limitations:    [] Patient's cognitive status and saf ety concerns          [] Status of current condition      [] Weight bearing restriction  [] Cardiopulmunary Restriction    Participation Restrictions:   [] Goals and goal agreement with the patient     [] Rehab potential (prognosis) and probable outcome      Examination of Body System: (criteria)    [] 87215 - addressing 1-2 elements    [] 76001 - addressing a total of 3 or more elements     [] 84182 -  Addressing a total of 4 or more elements         Clinical Presentation: (criteria)  Stable - 69574     On examination of body system using standardized tests and measures patient presents with 1-2 elements from any of the following: body structures and functions, activity limitations, and/or participation restrictions.  Leading to a clinical presentation that is considered stable and/or uncomplicated                              Clinical Decision Making  (Eval Complexity):  Low- 44184     Time Tracking:     PT Received On: 11/23/18  PT Start Time: 0946     PT Stop Time: 1000  PT Total Time (min): 14 min     Billable Minutes: Evaluation 14      Corbin Swanson, PT  11/23/2018

## 2018-11-23 NOTE — CONSULTS
"Consult Note  Palliative Care      Consult Requested By: Case Guevara MD  Reason for Consult: Goals of Care Discussion    Thank you for the consult and the opportunity to participate in Mr. Mendez care    SUBJECTIVE:     History of Present Illness:  Disease Process:  Jerrod Mendez is a 64 yo male with HTN, HLD, CAD s/p PCI (2015), Type 2 Diabetes Mellitus, JIM utilizes nightly CPAP, hx of tobacco use (quit 8 years ago) and right neck mass with recently diagnosed tonsillar CA. He presented to [facility] ED with acute onset of expressive aphasia and right lower extremity weakness. The patient reports onset of symptoms at 8 pm. He was playing cards with family at the time when he noted speech became very slurred with inability "to get words out." The patient reports associated dizziness, flushed sensation, pallor, blurred vision, substernal chest tightness and nausea without vomiting. The patient states he attempted to walk and felt like he was dragging his right leg. He denies syncopal event. He reports chest tightness resolved with SL nitro. No prior similar episodes. He states symptoms have improved, but not yet resolved. CT brain negative for acute findings. CXR negative, NSR on telemetry. Pt vitals stable.       Palliative medicine met with patient and spouse this pm. Patient working with speech therapy. Discussed palliative medicine services. Voiced understanding. Patient stated, "I am waiting on biopsy results but what they tell me I will have to do radiation 5 days a week for 3 to 4 weeks." Wife verbalize they have to get used to using the word cancer. They live in [place], [place] and will be doing treatments at [facility]. They are very anxious about waiting on results. Emotional support given at this time. Patient and wife expressed strongly that they will do whatever treatment that is recommended by doctors. Patient will remain full code at this time. Palliative Medicine clinic offered to patient " upon discharge. All questions and concerns addressed. Contact information given. Palliative will continue to follow for [facility].       Past Medical History:   Diagnosis Date    Cancer     Diabetes mellitus     High cholesterol     Hypertension      Past Surgical History:   Procedure Laterality Date    CHOLECYSTECTOMY      CORONARY ANGIOPLASTY WITH STENT PLACEMENT      HEART CATH-LEFT Right 5/22/2015    Performed by Jose Mendez MD at [facility] CATH LAB     History reviewed. No pertinent family history.  Social History     Tobacco Use    Smoking status: Former Smoker     Types: Cigarettes    Smokeless tobacco: Former User    Tobacco comment: Quit 8 years ago   Substance Use Topics    Alcohol use: Yes     Comment: socially    Drug use: No       Mental Status: Oriented x3    ECOG Performance Status Grade: 1 - Ambulates, capable of light work      OBJECTIVE:     Pain Assessment: No pain reported at this time    Decision-Making Capacity: Patient answered questions, Family answered questions    Advanced Directives:  Living Will: No  Do Not Resuscitate Status: Yes  Medical Power of : No  Registered Organ Donor: No    Living Arrangements: Lives with spouse    Psychosocial, Spiritual, Cultural:  Patient's most important priorities:  none    Patient's biggest concerns/fears:  none    Previous death/end of life care history:  none    Patient's goals/hopes:  none    ASSESSMENT/PLAN:     Recommendations:  Continue medical treatment  Code status: Full Code  Family will discharge home and continue with treatment recommended by team.    Palliative care will continue to assist patient and family with the goals of care.     Palliative care will continue to follow.     Thank you for the consult and the opportunity to participate in  Mr. Mitchell's care.       Mila Ledesma, MSN, APRN, NP-C   Palliative Medicine   [facility]  [phone] or [phone]        >50% of 70  min visit spent in chart review, face  to face discussion of goals of care with patient, family, symptom assessment, coordination of care and emotional support.

## 2018-11-23 NOTE — PLAN OF CARE
11/23/18 1336   Discharge Assessment   Assessment Type Discharge Planning Assessment   Confirmed/corrected address and phone number on facesheet? Yes   Assessment information obtained from? Patient   Prior to hospitilization cognitive status: Alert/Oriented   Prior to hospitalization functional status: Independent   Current cognitive status: Alert/Oriented   Current Functional Status: Independent   Lives With spouse   Able to Return to Prior Arrangements yes   Is patient able to care for self after discharge? Yes   Patient's perception of discharge disposition home or selfcare   Readmission Within The Last 30 Days no previous admission in last 30 days   Patient currently being followed by outpatient case management? No   Patient currently receives any other outside agency services? Yes   Is it the patient/care giver preference to resume care with the current outside agency? No   Equipment Currently Used at Home none   Do you have any problems affording any of your prescribed medications? No   Is the patient taking medications as prescribed? yes   Does the patient have transportation home? Yes   Transportation Available none   Does the patient receive services at the [facility]? No   Discharge Plan A Home   Discharge Plan B Home   Patient/Family In Agreement With Plan yes     Brenda Dejesus RN, CCM, CMSRN  RN Transition Navigator  [phone]

## 2018-11-23 NOTE — ASSESSMENT & PLAN NOTE
62 yo male with hx of HTN, HLD, CAD and T2DM presents with report of acute onset of expressive aphasia and right lower extremity weakness. Onset 8 pm. No acute findings on CT Brain. Pt evaluated by neuro per tele stroke. Pt received 325 ASA, 300 mg plavix,  Lovenox and statin. No neuro deficits noted on exam.    --check lipid panel, A1C   --MRI brain   --MRA Head/Neck   --TTE   --neuro checks q 4h   --consult PT/OT/SLP  --keep NPO for now, (pt failed bedside swallow eval per nursing)  --fall/aspiration precautions  --continue asa, statin

## 2018-11-23 NOTE — PT/OT/SLP EVAL
Occupational Therapy   Evaluation and Discharge Note    Name: Jerrod Mendez  MRN: [id number]  Admitting Diagnosis:  TIA (transient ischemic attack)      Recommendations:     Discharge Recommendations: (per SLP recs)  Discharge Equipment Recommendations:  none  Barriers to discharge:  None    History:     Occupational Profile:  Living Environment: Pt lives with spouse in [facility], 2 steps to enter, tub/shower combo  Previous level of function: independent; driving; works at a    Equipment Owned:  none  Assistance upon Discharge: from spouse     Past Medical History:   Diagnosis Date    Cancer     Diabetes mellitus     High cholesterol     Hypertension        Past Surgical History:   Procedure Laterality Date    CHOLECYSTECTOMY      CORONARY ANGIOPLASTY WITH STENT PLACEMENT      HEART CATH-LEFT Right 5/22/2015    Performed by Jose Mendez MD at [facility] CATH LAB       Subjective     Chief Complaint: Pt reports he feels better than on admit; states he feels his strength, balance and mentation are back to baseline; reports deficits with word finding/expressive aphasia; states he has had recent issues with swallowing 2/2 mass in neck; states lying in bed is difficult for him  Patient/Family stated goals: none stated   Communicated with: nsg prior to session.  Pain/Comfort:  · Pain Rating 1: 0/10    Patients cultural, spiritual, Quaker conflicts given the current situation:      Objective:     Patient found with:      General Precautions: Standard, fall   Orthopedic Precautions:N/A   Braces: N/A     Occupational Performance:    Bed Mobility:    · Patient completed Scooting/Bridging with modified independence  · Patient completed Supine to Sit with modified independence  · Patient completed Sit to Supine with modified independence    Functional Mobility/Transfers:  · Patient completed Sit <> Stand Transfer with supervision  with  no assistive device   · Functional Mobility: supervision; No AD; no LOB  "    Activities of Daily Living:  · N/A    Cognitive/Visual Perceptual:  Cognitive/Psychosocial Skills:     -       Oriented to: Person, Place, Time and Situation   -       Follows Commands/attention:Follows multistep  commands  -       Communication: clear; soft spoken; pt appears with lag in expressing self/when answering questions; states he does feel issues with word finding  -       Memory: No Deficits noted  -       Safety awareness/insight to disability: intact   -       Mood/Affect/Coping skills/emotional control: Appropriate to situation    Physical Exam:  Balance:    -       WFL   Postural examination/scapula alignment:    -       Rounded shoulders  Skin integrity: Visible skin intact  Edema:  None noted  Sensation:    -       Intact light touch, however, pt does reports R anterior thigh tingling/numbness that has been going on for an extended period of time prior to hospitalization   Dominant hand:    -       right  Upper Extremity Range of Motion:   BUE ROM WFL   Upper Extremity Strength:  BUE strength WFL    Strength:  WFL bilaterally   Fine Motor Coordination:    -       Intact  Gross motor coordination:   WFL    Patient left supine with all lines intact, call button in reach, bed alarm on and nsg notified    [facility] 6 Click:  AMPA Total Score: 23    Treatment & Education:  Stroke education provided     Education:    Assessment:     Jerrod Mendez is a 63 y.o. male with a medical diagnosis of TIA (transient ischemic attack). At this time, patient is functioning at their prior level of function and does not require further acute OT services. Pt performing at baseline for ADLs and functional mobility. BUE ROM/MMT/FMC/GMC/sensation all WFL. Pt reports no visual deficits.  Pt does report that he is having some deficits expressing self.    Pt does not require further OT services at this time. D/c OT.     Clinical Decision Makin.  OT Low:  "Pt evaluation falls under low complexity for evaluation " "coding due to performance deficits noted in 1-3 areas as stated above and 0 co-morbities affecting current functional status. Data obtained from problem focused assessments. No modifications or assistance was required for completion of evaluation. Only brief occupational profile and history review completed."     Plan:     During this hospitalization, patient does not require further acute OT services.  Please re-consult if situation changes.    · Plan of Care Reviewed with: patient    This Plan of care has been discussed with the patient who was involved in its development and understands and is in agreement with the identified goals and treatment plan    GOALS:   Multidisciplinary Problems     Occupational Therapy Goals     Not on file          Multidisciplinary Problems (Resolved)        Problem: Occupational Therapy Goal    Goal Priority Disciplines Outcome Interventions   Occupational Therapy Goal   (Resolved)     OT, PT/OT Outcome(s) achieved                    Time Tracking:     OT Date of Treatment: 11/23/18  OT Start Time: 0946  OT Stop Time: 0958  OT Total Time (min): 12 min    Billable Minutes:Evaluation 12    Mili Lewis OT  11/23/2018    "

## 2018-11-23 NOTE — PLAN OF CARE
Problem: SLP Goal  Goal: SLP Goal  Short Term Goals:  1. Pt will participate in BSS to determine least restrictive diet.- MET 11/23  2. Pt will participate in informal speech-lang eval to r/o cognitive-linguistic deficits. MET 11/23    Outcome: Outcome(s) achieved Date Met: 11/23/18 11/23:  Pt participated in clinical swallow eval this PM. Pt tolerated thin liquids, puree, and hard solid textures with no overt s/s of aspiration, at bedside. Pt also participated in informal speech screen to determine if further eval is required to r/o cognitive-linguistic deficits--per informal speech screen, pt demonstrates baseline cognitive-linguistic skills. SLP recs: Regular/thin liquids  diet with universal swallow precautions.  Skilled ST services at level of acute care no longer required as pt has met all ST goals. However, pt would benefit from OP speech therapy consult prior to initiation of radiation/chemotherapy for tonsillar CA for pre-intervention of possible dysphagia and dysphonia d/t CA treatment. SLP notified MARY ELLEN Enriquez of results/recs. PA in agreement with SLP recs.  CARMEN Jonas., CCC-SLP  Speech-Language Pathologist

## 2018-11-23 NOTE — PLAN OF CARE
Discharge orders noted, no HH or HME ordered.    Pt's nurse will go over medications/signs and symptoms prior to discharge       11/23/18 1631   Final Note   Assessment Type Final Discharge Note   Anticipated Discharge Disposition Home   What phone number can be called within the next 1-3 days to see how you are doing after discharge? [phone]   Hospital Follow Up  Appt(s) scheduled? No  (Offices closed for Thanksgiving Holiday Weekend.  Patient to schedule own follow up appointment.)   Right Care Referral Info   Post Acute Recommendation No Care     Pinky Alexis RN Transitional Navigator  [phone]

## 2018-11-23 NOTE — DISCHARGE INSTRUCTIONS
Brain Aneurysm, What Is (English) View Edit Remove   TIA: Transient Ischemic Attack (English) View Edit Remove

## 2018-11-23 NOTE — H&P
"[facility] Medicine  History & Physical    Patient Name: Jerrod Mendez  MRN: [id number]  Admission Date: 11/22/2018  Attending Physician: Case Guevara MD   Primary Care Provider: Primary Doctor No         Patient information was obtained from patient, spouse/SO and ER records.     Subjective:     Principal Problem:TIA (transient ischemic attack)    Chief Complaint: No chief complaint on file.       HPI: Jerrod Mendez is a 64 yo male with HTN, HLD, CAD s/p PCI (2015), Type 2 Diabetes Mellitus, JIM utilizes nightly CPAP, hx of tobacco use (quit 8 years ago) and right neck mass with recently diagnosed tonsillar CA. He presented to [facility] ED with acute onset of expressive aphasia and right lower extremity weakness. The patient reports onset of symptoms at 8 pm. He was playing cards with family at the time when he noted speech became very slurred with inability "to get words out." The patient reports associated dizziness, flushed sensation, pallor, blurred vision, substernal chest tightness and nausea without vomiting. The patient states he attempted to walk and felt like he was dragging his right leg. He denies syncopal event. He reports chest tightness resolved with SL nitro. No prior similar episodes. He states symptoms have improved, but not yet resolved. CT brain negative for acute findings. CXR negative, NSR on telemetry. Pt vitals stable. Pt evaluated  by vascular neurology Dr. Omer via tele stroke. He received 325 mg ASA, simvastatin and Lovenox prior to arrival. Pt admitted to [facility] for further evaluation and treatment. His PCP is Dr. Pieter Velazquez.                   Past Medical History:   Diagnosis Date    Cancer     Diabetes mellitus     High cholesterol     Hypertension        Past Surgical History:   Procedure Laterality Date    CHOLECYSTECTOMY      CORONARY ANGIOPLASTY WITH STENT PLACEMENT      HEART CATH-LEFT Right 5/22/2015    Performed by Jose" Mitch Mendez MD at [facility]       Review of patient's allergies indicates:  Not on File    Current Facility-Administered Medications on File Prior to Encounter   Medication    [COMPLETED] acetaminophen tablet 1,000 mg    [COMPLETED] atorvastatin tablet 80 mg    [COMPLETED] clopidogrel tablet 300 mg    [COMPLETED] enoxaparin injection 40 mg     Current Outpatient Medications on File Prior to Encounter   Medication Sig    aspirin 81 MG Chew Take 81 mg by mouth once daily.    carvedilol (COREG) 6.25 MG tablet Take half tab a day for 1 week then take 1 tab daily thereafter    glipiZIDE (GLUCOTROL) 5 MG tablet Take 5 mg by mouth 2 (two) times daily before meals.    hydrocodone-acetaminophen 5-325mg (NORCO) 5-325 mg per tablet Take 1 tablet by mouth every 6 (six) hours as needed for Pain.    lisinopril (PRINIVIL,ZESTRIL) 5 MG tablet Take 5 mg by mouth once daily.    metformin (GLUCOPHAGE) 1000 MG tablet Take 1,000 mg by mouth 2 (two) times daily with meals.    nitroGLYCERIN (NITROSTAT) 0.3 MG SL tablet Place 0.3 mg under the tongue every 5 (five) minutes as needed for Chest pain.    simvastatin (ZOCOR) 40 MG tablet Take 40 mg by mouth every evening.    meclizine (ANTIVERT) 25 mg tablet Take 25 mg by mouth 3 (three) times daily as needed.     Family History     None        Tobacco Use    Smoking status: Former Smoker     Types: Cigarettes    Smokeless tobacco: Former User    Tobacco comment: Quit 8 years ago   Substance and Sexual Activity    Alcohol use: Yes     Comment: socially    Drug use: No    Sexual activity: Not on file     Review of Systems   Constitutional: Negative for chills, diaphoresis and fever.   HENT: Positive for congestion.    Eyes: Positive for visual disturbance.   Respiratory: Negative for cough, chest tightness, shortness of breath and wheezing.    Cardiovascular: Negative for chest pain, palpitations and leg swelling.   Gastrointestinal: Negative for abdominal pain,  diarrhea, nausea and vomiting.   Genitourinary: Negative for dysuria, flank pain, frequency and hematuria.   Musculoskeletal: Negative for back pain and myalgias.   Neurological: Positive for dizziness and weakness (Right lower extremity ). Negative for syncope, light-headedness and headaches.   Psychiatric/Behavioral: Negative for confusion.     Objective:     Vital Signs (Most Recent):  Temp: 96.5 °F (35.8 °C) (11/23/18 0004)  Pulse: 89 (11/23/18 0028)  Resp: 20 (11/23/18 0004)  BP: (!) 140/92 (11/23/18 0004)  SpO2: 96 % (11/23/18 0004) Vital Signs (24h Range):  Temp:  [96.5 °F (35.8 °C)-97.3 °F (36.3 °C)] 96.5 °F (35.8 °C)  Pulse:  [] 89  Resp:  [18-26] 20  SpO2:  [94 %-97 %] 96 %  BP: (112-157)/(81-98) 140/92     Weight: 116.5 kg (256 lb 13.4 oz)  Body mass index is 35.82 kg/m².    Physical Exam   Constitutional: He is oriented to person, place, and time. He appears well-developed and well-nourished. No distress.   HENT:   Head: Normocephalic and atraumatic.   Eyes: Conjunctivae are normal. Pupils are equal, round, and reactive to light.   Neck: Normal range of motion. No JVD present.   + right neck mass, mild tenderness to palpation    Cardiovascular: Normal rate, regular rhythm, normal heart sounds and intact distal pulses.   Pulmonary/Chest: Effort normal and breath sounds normal. No respiratory distress. He has no wheezes.   Abdominal: Soft. Bowel sounds are normal. He exhibits no distension. There is no tenderness. There is no guarding.   Musculoskeletal: Normal range of motion. He exhibits no edema or tenderness.   Neurological: He is alert and oriented to person, place, and time. No cranial nerve deficit or sensory deficit.   Skin: Skin is warm and dry. Capillary refill takes less than 2 seconds. No erythema.   Psychiatric: He has a normal mood and affect. His behavior is normal.         CRANIAL NERVES     CN III, IV, VI   Pupils are equal, round, and reactive to light.       Significant Labs:    BMP:   Recent Labs   Lab 11/22/18 2103   *      K 4.0      CO2 24   BUN 21   CREATININE 1.4   CALCIUM 9.8   MG 1.7     CBC:   Recent Labs   Lab 11/22/18 2103   WBC 8.87   HGB 14.2   HCT 41.8        Troponin:   Recent Labs   Lab 11/22/18 2103   TROPONINI <0.006     TSH:   Recent Labs   Lab 11/22/18 2103   TSH 2.751       Significant Imaging: I have reviewed all pertinent imaging results/findings within the past 24 hours.    Assessment/Plan:     * TIA (transient ischemic attack)    62 yo male with hx of HTN, HLD, CAD and T2DM presents with report of acute onset of expressive aphasia and right lower extremity weakness. Onset 8 pm. No acute findings on CT Brain. Pt evaluated by neuro per tele stroke. Pt received 325 ASA, 300 mg plavix,  Lovenox and statin. No neuro deficits noted on exam.    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 Pt reports plans to start radiation. Continue outpatient management.        CAD (coronary artery disease)    S/p PCI (2013, unknown vessel)   HLD     --continue asa, statin         Type 2 diabetes mellitus, without long-term current use of insulin    A1C 8.1     --Hold po hypoglycemics  --accu-check AC&HS, diabetic diet, SSI, hypoglycemic protocol        Essential hypertension    Chronic. -157. Pt takes lisinopril 5 mg po daily and coreg  6.25 mg daily     --hold antihypertensives for   --allow permissive  Hypertension        Obstructive sleep apnea (adult) (pediatric)    --CPAP per home settings        Chest pain    --resolved with SL nitro   --trend troponin   --monitor telemetry   --daily ASA          VTE Risk Mitigation (From admission, onward)         Ordered     enoxaparin injection 40 mg  Daily      11/23/18 0018     IP VTE HIGH RISK PATIENT  Once      11/23/18 0018             Cookie Riggs NP  Department of Hospital Medicine   [facility]

## 2018-11-23 NOTE — CONSULTS
[facility]  Vascular Neurology  Comprehensive Stroke Center  Tele-Consultation Note      Consults    Consulting Provider: Spoke Physician:: Arnold Perkins  Current Providers  No providers found    Patient Location: [facility]  Spoke hospital nurse at bedside with patient assisting consultant.     Patient information was obtained from patient and spouse/SO.       Assessment/Plan:     STROKE DOCUMENTATION     Acute Stroke Times:   Acute Stroke Times   Symptom Onset Date: 11/22/18  Symptom Onset Time: 2000  Stroke Team Arrival Date: 11/22/18  Stroke Team Arrival Time: 2112  CT Interpretation Time: 2114    NIH Scale:  Interval: baseline (upon arrival/admit)  1a. Level Of Consciousness: 0-->Alert: keenly responsive  1b. LOC Questions: 0-->Answers both questions correctly  1c. LOC Commands: 0-->Performs both tasks correctly  2. Best Gaze: 0-->Normal  3. Visual: 0-->No visual loss  4. Facial Palsy: 0-->Normal symmetrical movements  5a. Motor Arm, Left: 0-->No drift: limb holds 90 (or 45) degrees for full 10 secs  5b. Motor Arm, Right: 0-->No drift: limb holds 90 (or 45) degrees for full 10 secs  6a. Motor Leg, Left: 0-->No drift: leg holds 30 degree position for full 5 secs  6b. Motor Leg, Right: 0-->No drift: leg holds 30 degree position for full 5 secs  7. Limb Ataxia: 0-->Absent  8. Sensory: 1-->Mild-to-moderate sensory loss: patient feels pinprick is less sharp or is dull on the affected side: or there is a loss of superficial pain with pinprick, but patient is aware of being touched(dec touch on the right)  9. Best Language: 0-->No aphasia: normal  10. Dysarthria: 0-->Normal  11. Extinction and Inattention (formerly Neglect): 0-->No abnormality  Total (NIH Stroke Scale): 1     Modified [place]    Gilbert Coma Scale:15   ABCD2 Score:    [id number] Score:   HAS -BLED Score:   ICH Score:   Hunt & Thomas Classification:       Diagnoses:   TIA involving  carotid artery    History concerning for possible left MCA distribution TIA / mild stroke vs brainstem event.  D/Dx also includes cardiac arrhythmia, presyncope, tumor involvement.  Bx results indicate squamous cell CA. Possibly hypercoagulable state.     Antithrombotics for secondary stroke prevention: Antiplatelets: Clopidogrel: 300 mg loading dose x 1, now    Statins for secondary stroke prevention and hyperlipidemia, if present:   Statins: Atorvastatin- 80 mg daily    Aggressive risk factor modification: HTN, DM, HLD, CAD     Rehab efforts: PT/OT/SLP to evaluate and treat    Diagnostics ordered/pending: Carotid ultrasound to assess vasculature, HgbA1C to assess blood glucose levels, Lipid Profile to assess cholesterol levels, MRA head to assess vasculature, MRA neck/arch to assess vasculature, MRI head without contrast to assess brain parenchyma, TTE to assess cardiac function/status , TSH to assess thyroid function.  Consider imaging studies soft tissue neck to assess for external vascular compression, and MRI brain +/- contrast for possible metastatic disease.     VTE prophylaxis: Enoxaparin 40 mg SQ every 24 hours    BP parameters: TIA: SBP <220 until imaging confirmation of no infarct              Blood pressure (!) 157/94, pulse 103, temperature 97.3 °F (36.3 °C), temperature source Oral, resp. rate (!) 21, weight 122.3 kg (269 lb 11.2 oz), SpO2 97 %.  Alteplase Eligible?: Yes  Alteplase Recommendation: Alteplase not recommended due to Symptoms resolved  and minimal deficit   Possible Interventional Revascularization Candidate? No; No significant neurological deficit    Disposition Recommendation: transfer to system sub-[facility]   by  ground  stat      Subjective:     History of Present Illness:  64 yo RH man, playing cards with friends family after dinner, suddenly became flushed, then pale, eyes not focused.  Unable to speak except for very hoarse, halting speech..  Reported feeling dizzy, spinning sensation.   Tried to walk and felt like he was dragging, weak on the right side and had difficulty communicating on phone.  Speech is a little better but still not quite normal.  No prior similar episodes. + Hx CAD, stenting. Takes ASA, HTN meds, statin      Woke up with symptoms?: no  Last known normal:        Recent bleeding noted: no  Does the patient take any Blood Thinners? no  Medications: Antiplatelets:  aspirin      Past Medical History: hypertension, diabetes, hyperlipidemia and LAST WEEK dx with possible malignancy, mass R side of neck suspicious for cancer.  Bx done, result s not available yet.     Past Surgical History: no major surgeries within the last 2 weeks    Family History: no relevant history    Social History: no smoking, no drinking, no drugs    Allergies: No Known Allergies No relevant allergies    Review of Systems   Constitutional: Negative for appetite change, fatigue, fever and unexpected weight change.   HENT: Negative for congestion, hearing loss, nosebleeds, sinus pressure and trouble swallowing.    Eyes: Negative for pain and visual disturbance.   Respiratory: Negative for cough, shortness of breath and wheezing.    Cardiovascular: Negative for chest pain and palpitations.   Gastrointestinal: Negative for abdominal pain, blood in stool, diarrhea, nausea and vomiting.   Endocrine: Negative for cold intolerance and heat intolerance.   Genitourinary: Negative for difficulty urinating, hematuria and urgency.   Musculoskeletal: Negative for arthralgias, back pain, gait problem, myalgias and neck pain.   Skin: Negative for rash and wound.   Neurological: Positive for dizziness, speech difficulty, weakness (generalized) and headaches. Negative for seizures and numbness.   Hematological: Does not bruise/bleed easily.   Psychiatric/Behavioral: Negative for decreased concentration, dysphoric mood and sleep disturbance.     Objective:   Vitals: Blood pressure 129/88, pulse (!) 111, temperature 97.3 °F (36.3  °C), temperature source Oral, resp. rate (!) 26, weight 122.3 kg (269 lb 11.2 oz), SpO2 96 %.     CT READ: Yes  No hemmorhage. No mass effect. No early infarct signs.     Physical Exam   Constitutional: He appears well-developed and well-nourished.   HENT:   Head: Normocephalic and atraumatic.   Cardiovascular: Normal rate and regular rhythm.   Pulmonary/Chest: Effort normal.   Psychiatric: He has a normal mood and affect. His behavior is normal. Judgment and thought content normal.   Vitals reviewed.            Recommended the emergency room physician to have a brief discussion with the patient and/or family if available regarding the risks and benefits of treatment, and to briefly document the occurrence of that discussion in his clinical encounter note.     The attending portion of this evaluation, treatment, and documentation was performed per Corbin Omer DO via audiovisual.    Billing code:  (non-intervention mild to moderate stroke, TIA, some mimics)    · This patient has a critical neurological condition/illness, with some potential for high morbidity and mortality.  · There is a moderate probability for acute neurological change leading to clinical and possibly life-threatening deterioration requiring highest level of physician preparedness for urgent intervention.  · Care was coordinated with other physicians involved in the patient's care.  · Radiologic studies and laboratory data were reviewed and interpreted, and plan of care was re-assessed based on the results.  · Diagnosis, treatment options and prognosis may have been discussed with the patient and/or family members or caregiver.      In your opinion, this was a: Tier 1    Consult End Time: 9:51 PM     Corbin Omer DO  [facility]  Vascular Neurology   [facility]

## 2018-11-23 NOTE — ED PROVIDER NOTES
[facility]                                                 Chief Complaint  63 y.o. male with Dizziness    History of Present Illness  Jerrod Mendez presents to the emergency room with trouble speaking tonight  Patient had trouble getting words out 1 hour prior to ER arrival, no CVA history  Patient has no history of stroke, does have a history of coronary/vascular disease  Patient recently was told that he may have tonsillar cancer, had biopsy last week  Pt states he cannot get words, telemedicine stroke consult immediately ordered    The history is provided by the patient   device was not used during this ER visit  Medical history: Cancer, diabetes, HLD, HTN  Surgeries: Cholecystectomy, coronary angioplasty/left heart catheterization  No Known Allergies     Review of Systems and Physical Exam      Review of Systems  -- Constitution - no fever, denies fatigue, no weakness, no chills  -- Eyes - no tearing or redness, no visual disturbance  -- Ear, Nose - no tinnitus or earache, no nasal congestion or discharge  -- Mouth,Throat - no sore throat, no toothache, normal voice, normal swallowing  -- Respiratory - denies cough and congestion, no shortness of breath, no KEMP  -- Cardiovascular - denies chest pain, no palpitations, denies claudication  -- Gastrointestinal - denies abdominal pain, nausea, vomiting, or diarrhea  -- Genitourinary - no dysuria, no hematuria, no flank pain, no bladder pain  -- Musculoskeletal - denies back pain, negative for myalgias and arthralgias   -- Neurological - trouble getting words out less than an hour ago  -- Skin - denies pallor, rash, or changes in skin. no hives or welts noted    Vital Signs  His blood pressure is 157/94 and his pulse is 103.   His respiration is 21 and oxygen saturation is 97%.     Physical Exam  -- Nursing note and vitals reviewed  -- Constitutional: Appears well-developed and well-nourished  -- Head: Atraumatic.  Normocephalic. No obvious abnormality  -- Eyes: Pupils are equal and reactive to light. Normal conjunctiva and lids  -- Cardiac: Normal rate, regular rhythm and normal heart sounds  -- Pulmonary: Normal respiratory effort, breath sounds clear to auscultation  -- Abdominal: Soft, no tenderness. Normal bowel sounds. Normal liver edge  -- Musculoskeletal: Normal range of motion, no effusions. Joints stable   -- Neurological: No focal deficits. Showed good interaction with staff  -- Skin: Warm and dry. No evidence of rash or cellulitis    Emergency Room Course      Lab Results     K 4.0      CO2 24   BUN 21   CREATININE 1.4    (H)   ALKPHOS 92   AST 30   ALT 47 (H)   BILITOT 1.9 (H)   ALBUMIN 4.2   PROT 7.2   WBC 8.87   HGB 14.2   HCT 41.8      CPK 63   CPK 63   CPKMB 0.7   TROPONINI <0.006   INR 0.9   BNP <10   DDIMER 0.26   MG 1.7   TSH 2.751     EKG  -- The EKG findings today were without concerning findings from baseline    Radiology  -- The CT of the head performed in the ER today was negative for acute pathology  -- Chest x-ray showed no infiltrate and showed no acute pathology    Medications Given  -- PO 1 g Tylenol given in today in the ER    Telemedicine Stroke Consult  -- Patient was seen by  Vascular Neurology via telemedicine in the ER today  -- Please see the telemedicine notes for full evaluation of the consult in the ER      Critical Care ED Physician Time (minutes):  -- Performed by: Arnold Perkins M.D.  -- Date/Time: 10:02 PM 11/22/2018   -- Direct Patient Care (Face Time): 10  -- Additional History from Records or Taking Additional History: 10  -- Ordering, Reviewing, and Interpreting Diagnostic Studies: 10  -- Total Time in Documentation: 5  -- Consultation with Other Physicians: 10  -- Consultation with Family Related to Condition: 10  -- Total Critical Care Time: 55     Diagnosis  -- The encounter diagnosis was Dysarthria.    Disposition and Plan  -- Disposition: transfer  --  Condition: stable  -- The patient needs a higher level of care  -- The patient is appropriate for transfer    This note is dictated on M*Modal word recognition program.  There are word recognition mistakes that are occasionally missed on review.          Arnold Perkins MD  11/22/18 9629

## 2018-11-24 NOTE — DISCHARGE SUMMARY
"[facility] Medicine  Discharge Summary    Patient Name: Jerrod Mendez  MRN: [id number]  Admission Date: 11/22/2018  Hospital Length of Stay: 1 days  Discharge Date and Time: No discharge date for patient encounter.  Attending Physician: Case Guevara MD   Discharging Provider: Jen Enriquez PA-C  Primary Care Provider: Caroline Stapleton MD      HPI:   Jerrod Mendez is a 64 yo male with HTN, HLD, CAD s/p PCI (2015), Type 2 Diabetes Mellitus, JIM utilizes nightly CPAP, hx of tobacco use (quit 8 years ago) and right neck mass with recently diagnosed tonsillar CA. He presented to [facility] ED with acute onset of expressive aphasia and right lower extremity weakness. The patient reports onset of symptoms at 8 pm. He was playing cards with family at the time when he noted speech became very slurred with inability "to get words out." The patient reports associated dizziness, flushed sensation, pallor, blurred vision, substernal chest tightness and nausea without vomiting. The patient states he attempted to walk and felt like he was dragging his right leg. He denies syncopal event. He reports chest tightness resolved with SL nitro. No prior similar episodes. He states symptoms have improved, but not yet resolved. CT brain negative for acute findings. CXR negative, NSR on telemetry. Pt vitals stable. Pt evaluated  by vascular neurology Dr. Omer via tele stroke. He received 325 mg ASA, simvastatin and Lovenox prior to arrival. Pt admitted to [facility] for further evaluation and treatment. His PCP is Dr. Pieter Velazquez.    * No surgery found *      Hospital Course:   Troponin was trended and remained negative. Lipid panel and TSH were within normal limits. A1C was 8.1. TTE showed EF of 55%, grade 1 diastolic dysfunction, mild aortic regurgitation. 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 PT " and OT found patient to be at his baseline with no further needs. SLP recommended outpatient ST prior to beginning radiation for his neck mass. Symptoms resolved. Hemodynamically stable and ready for discharge. Patient was discharged to home with outpatient referrals to Speech Therapy and Neurosurgery for evaluation of newly diagnosed aneurysm. He was given plavix 75 mg PO daily x 30 days for secondary stroke prevention. He will continue to follow-up at [facility] for treatment of malignant neck mass.     Consults:   Consults (From admission, onward)        Status Ordering Provider     Inpatient consult to Palliative Care  Once     Provider:  Mila Ledesma, NP    Completed BETH CASTRO     Inpatient consult to Social Work/Case Management  Once     Provider:  (Not yet assigned)    Acknowledged CAROL JOHNSON     Inpatient Telestroke rounding  Once     Provider:  (Not yet assigned)    Acknowledged BETH CASTRO          Final Active Diagnoses:    Diagnosis Date Noted POA    Essential hypertension [I10] 11/23/2018 Yes    Type 2 diabetes mellitus, without long-term current use of insulin [E11.9] 11/23/2018 Yes    CAD (coronary artery disease) [I25.10] 11/23/2018 Yes    Hyperlipidemia [E78.5] 11/23/2018 Yes    Neck mass [R22.1] 11/23/2018 Yes    Anterior communicating artery aneurysm [I67.1] 11/23/2018 Yes    Obstructive sleep apnea (adult) (pediatric) [G47.33]  Yes      Problems Resolved During this Admission:    Diagnosis Date Noted Date Resolved POA    PRINCIPAL PROBLEM:  TIA (transient ischemic attack) [G45.9] 11/23/2018 11/23/2018 Yes    Goals of care, counseling/discussion [Z71.89] 11/23/2018 11/23/2018 Not Applicable    Counseling regarding advanced care planning and goals of care [Z71.89] 11/23/2018 11/23/2018 Not Applicable    Palliative care encounter [Z51.5] 11/23/2018 11/23/2018 Not Applicable    Chest pain [R07.9] 05/21/2015 11/23/2018 Yes       Discharged Condition:  good    Disposition: Home or Self Care    Follow Up:  Follow-up Information     Schedule an appointment as soon as possible for a visit with Caroline Stapleton MD.    Specialty:  Family Medicine  Why:  Offices closed for Thanksgiving Holiday Weekend.  Patient to schedule own follow up appointment.  Contact information:  [address] [facility] [place] [address]             Please follow up.    Why:  Ambulatory Referral placed with Speach Therapy and Neurosurgery, their offices will contact patient with appointment information               Patient Instructions:      Referral to OutPatient Speech Therapy   Referral Priority: Routine Referral Type: Speech Therapy   Referral Reason: Specialty Services Required   Requested Specialty: Speech Pathology   Number of Visits Requested: 1     Ambulatory referral to Neurosurgery   Referral Priority: Routine Referral Type: Consultation   Referral Reason: Specialty Services Required   Requested Specialty: Neurosurgery   Number of Visits Requested: 1     Diet diabetic     Notify your health care provider if you experience any of the following:  increased confusion or weakness     Notify your health care provider if you experience any of the following:  persistent dizziness, light-headedness, or visual disturbances     Notify your health care provider if you experience any of the following:  severe persistent headache     Notify your health care provider if you experience any of the following:  severe uncontrolled pain     Activity as tolerated       Significant Diagnostic Studies: Labs: All labs within the past 24 hours have been reviewed    Pending Diagnostic Studies:     None         Medications:  Reconciled Home Medications:      Medication List      START taking these medications    clopidogrel 75 mg tablet  Commonly known as:  PLAVIX  Take 1 tablet (75 mg total) by mouth once daily.        CONTINUE taking these medications    aspirin 81 MG Chew  Take 81 mg by mouth once daily.      carvedilol 6.25 MG tablet  Commonly known as:  COREG  Take half tab a day for 1 week then take 1 tab daily thereafter     glipiZIDE 5 MG tablet  Commonly known as:  GLUCOTROL  Take 5 mg by mouth 2 (two) times daily before meals.     HYDROcodone-acetaminophen 5-325 mg per tablet  Commonly known as:  NORCO  Take 1 tablet by mouth every 6 (six) hours as needed for Pain.     lisinopril 5 MG tablet  Commonly known as:  PRINIVIL,ZESTRIL  Take 5 mg by mouth once daily.     meclizine 25 mg tablet  Commonly known as:  ANTIVERT  Take 25 mg by mouth 3 (three) times daily as needed.     metFORMIN 1000 MG tablet  Commonly known as:  GLUCOPHAGE  Take 1,000 mg by mouth 2 (two) times daily with meals.     nitroGLYCERIN 0.3 MG SL tablet  Commonly known as:  NITROSTAT  Place 0.3 mg under the tongue every 5 (five) minutes as needed for Chest pain.     simvastatin 40 MG tablet  Commonly known as:  ZOCOR  Take 40 mg by mouth every evening.            Indwelling Lines/Drains at time of discharge:   Lines/Drains/Airways          None          Time spent on the discharge of patient: 40 minutes  Patient was seen and examined on the date of discharge and determined to be suitable for discharge.      MÓNICA Vasquez MD  [facility]  MD Master Pizarro MD Elizabeth Knipp, PA-C Brittany Chatman, NP Kristin Stein, PA-C Arekeva Selmon, NP-C  [address] [place] [address]  Office Phone: [phone]  Office Fax: [phone]

## 2018-12-05 PROBLEM — C09.9 TONSILLAR CANCER: Status: ACTIVE | Noted: 2018-12-05

## 2018-12-18 ENCOUNTER — TELEPHONE (OUTPATIENT)
Dept: EMERGENCY MEDICINE | Facility: HOSPITAL | Age: 63
End: 2018-12-18

## 2018-12-20 PROBLEM — R13.12 DYSPHAGIA, OROPHARYNGEAL: Status: ACTIVE | Noted: 2018-12-20

## 2018-12-26 PROBLEM — R42 DIZZINESS AND GIDDINESS: Status: ACTIVE | Noted: 2018-11-26

## 2018-12-26 PROBLEM — M10.9 GOUT: Status: ACTIVE | Noted: 2018-12-07

## 2018-12-26 PROBLEM — Z93.1 S/P PERCUTANEOUS ENDOSCOPIC GASTROSTOMY (PEG) TUBE PLACEMENT: Status: ACTIVE | Noted: 2018-12-26

## 2018-12-26 PROBLEM — G45.9 TRANSIENT CEREBRAL ISCHEMIA: Status: ACTIVE | Noted: 2018-11-26

## 2018-12-26 PROBLEM — T45.1X5A CINV (CHEMOTHERAPY-INDUCED NAUSEA AND VOMITING): Status: ACTIVE | Noted: 2018-12-26

## 2018-12-26 PROBLEM — G47.30 SLEEP APNEA: Status: ACTIVE | Noted: 2018-05-15

## 2018-12-26 PROBLEM — J35.8 TONSILLAR MASS: Status: ACTIVE | Noted: 2018-11-19

## 2018-12-26 PROBLEM — R47.1 DYSARTHRIA: Status: ACTIVE | Noted: 2018-11-26

## 2018-12-26 PROBLEM — R13.10 DYSPHAGIA: Status: ACTIVE | Noted: 2018-11-19

## 2018-12-26 PROBLEM — Z86.73 HISTORY OF TRANSIENT ISCHEMIC ATTACK: Status: ACTIVE | Noted: 2018-11-27

## 2018-12-26 PROBLEM — I10 HYPERTENSIVE DISORDER: Status: ACTIVE | Noted: 2018-12-26

## 2018-12-26 PROBLEM — I67.1 INTRACRANIAL ANEURYSM: Status: ACTIVE | Noted: 2018-11-23

## 2018-12-26 PROBLEM — S23.9XXA THORACIC BACK SPRAIN: Status: ACTIVE | Noted: 2018-01-29

## 2018-12-26 PROBLEM — R11.2 CINV (CHEMOTHERAPY-INDUCED NAUSEA AND VOMITING): Status: ACTIVE | Noted: 2018-12-26

## 2018-12-26 PROBLEM — M54.50 LOW BACK PAIN: Status: ACTIVE | Noted: 2018-08-22

## 2018-12-26 PROBLEM — D49.0: Status: ACTIVE | Noted: 2018-11-19

## 2018-12-26 PROBLEM — Z71.9 ENCOUNTER FOR EDUCATION: Status: ACTIVE | Noted: 2018-12-26

## 2018-12-26 PROBLEM — E78.5 HYPERLIPIDEMIA: Status: ACTIVE | Noted: 2018-12-26

## 2019-01-03 PROBLEM — E86.0 DEHYDRATION, SEVERE: Status: ACTIVE | Noted: 2019-01-03

## 2019-01-22 PROBLEM — M54.9 ACUTE RIGHT-SIDED BACK PAIN: Status: ACTIVE | Noted: 2019-01-22

## 2019-02-07 PROBLEM — D61.818 PANCYTOPENIA: Status: ACTIVE | Noted: 2019-02-07

## 2019-02-07 PROBLEM — R79.89 ELEVATED SERUM CREATININE: Status: ACTIVE | Noted: 2019-02-07

## 2019-02-07 PROBLEM — R63.0 POOR APPETITE: Status: ACTIVE | Noted: 2019-02-07

## 2019-02-21 PROBLEM — R13.12 OROPHARYNGEAL DYSPHAGIA: Status: ACTIVE | Noted: 2019-02-21

## 2019-05-16 PROBLEM — R53.83 FATIGUE: Status: ACTIVE | Noted: 2019-05-16

## 2019-08-22 PROBLEM — N20.9 UROLITHIASIS: Status: ACTIVE | Noted: 2019-06-19

## 2019-08-22 PROBLEM — N17.9 ACUTE RENAL FAILURE SYNDROME: Status: ACTIVE | Noted: 2019-06-19

## 2019-08-22 PROBLEM — N14.2: Status: ACTIVE | Noted: 2019-06-19

## 2019-08-22 PROBLEM — D17.30 LIPOMA OF SKIN: Status: ACTIVE | Noted: 2017-07-20

## 2019-08-22 PROBLEM — Z87.448 HISTORY OF HEMATURIA: Status: ACTIVE | Noted: 2019-01-29

## 2019-08-22 PROBLEM — S43.429A SPRAIN OF ROTATOR CUFF CAPSULE: Status: ACTIVE | Noted: 2019-03-19

## 2019-08-22 PROBLEM — E66.9 OBESITY WITH BODY MASS INDEX 30 OR GREATER: Status: ACTIVE | Noted: 2017-07-20

## 2019-08-22 PROBLEM — B35.4 TINEA CORPORIS: Status: ACTIVE | Noted: 2017-07-20

## 2019-08-22 PROBLEM — B35.1 ONYCHOMYCOSIS: Status: ACTIVE | Noted: 2017-07-20

## 2019-10-07 PROBLEM — N18.30 CKD (CHRONIC KIDNEY DISEASE) STAGE 3, GFR 30-59 ML/MIN: Status: ACTIVE | Noted: 2019-10-07

## 2019-10-11 ENCOUNTER — TELEPHONE (OUTPATIENT)
Dept: HEMATOLOGY/ONCOLOGY | Facility: CLINIC | Age: 64
End: 2019-10-11

## 2019-10-11 NOTE — TELEPHONE ENCOUNTER
Spoke with Dr Love. FNA is hypocellular, but suspicious for squamous cell carcinoma.   Called and spoke with patient. Discussed FNA result. Given the clinical picture, this is consistent with recurrent tonsillar cancer. I have spoken with Dr Romano. He wants to get an MRI to further evaluate. Patient is scheduled for MRI next week. I will talk to Dr Romano after that. If it is resectable, will refer him to Dr Romano. Patient states he has seen Dr Rodriguez. Dr Rodriguez told him radiation is unlikely given that the area is very close to the previous radiation field. Discussed with patient that if it is not resectable, we will start palliative chemotherapy+immunotherapy. Patient understands and agrees with the plan.

## 2019-10-15 ENCOUNTER — TELEPHONE (OUTPATIENT)
Dept: HEMATOLOGY/ONCOLOGY | Facility: CLINIC | Age: 64
End: 2019-10-15

## 2019-10-15 NOTE — TELEPHONE ENCOUNTER
Called patient 3 times. NA. Left VM. MRI neck today showed known right supraclavicular LNs. Dr Romano at [facility] has reviewed the MRI and feels these LNs are resectable. His office will schedule a f/u with him. Encouraged to call should questions arise.

## 2019-10-16 ENCOUNTER — TELEPHONE (OUTPATIENT)
Dept: HEMATOLOGY/ONCOLOGY | Facility: CLINIC | Age: 64
End: 2019-10-16

## 2019-10-16 NOTE — TELEPHONE ENCOUNTER
Call placed to patient's wife to discuss getting vaccines. Informed Marina that yes, Mr. Mendez can receive his flu and tetanus vaccines before starting chemo on Monday. Verbalized understanding.

## 2019-10-16 NOTE — TELEPHONE ENCOUNTER
----- Message from Valente Estrada sent at 10/16/2019  2:44 PM CDT -----  Contact: Marina (spouse)  Name of Who is Calling: Marina (spouse)    What is the request in detail: Would like to speak with staff in regards to knowing if the patient can have his flu and tetanus injections, before he starts chemo on Monday. Please advise      Can the clinic reply by [facility]: no      What Number to Call Back if not in [facility]: [phone]

## 2019-10-19 PROBLEM — R10.9 FLANK PAIN: Status: ACTIVE | Noted: 2019-09-17

## 2019-10-19 PROBLEM — R59.0 SUPRACLAVICULAR LYMPHADENOPATHY: Status: ACTIVE | Noted: 2019-10-09

## 2019-10-22 ENCOUNTER — OFFICE VISIT (OUTPATIENT)
Dept: OTOLARYNGOLOGY | Facility: CLINIC | Age: 64
End: 2019-10-22
Payer: COMMERCIAL

## 2019-10-22 VITALS
WEIGHT: 254 LBS | TEMPERATURE: 98 F | HEART RATE: 72 BPM | DIASTOLIC BLOOD PRESSURE: 80 MMHG | SYSTOLIC BLOOD PRESSURE: 127 MMHG | BODY MASS INDEX: 35.42 KG/M2

## 2019-10-22 DIAGNOSIS — C77.0 SECONDARY MALIGNANT NEOPLASM OF CERVICAL LYMPH NODE: Primary | ICD-10-CM

## 2019-10-22 DIAGNOSIS — C09.9 TONSILLAR CANCER: ICD-10-CM

## 2019-10-22 PROCEDURE — 3008F PR BODY MASS INDEX (BMI) DOCUMENTED: ICD-10-PCS | Mod: CPTII,S$GLB,, | Performed by: OTOLARYNGOLOGY

## 2019-10-22 PROCEDURE — 3008F BODY MASS INDEX DOCD: CPT | Mod: CPTII,S$GLB,, | Performed by: OTOLARYNGOLOGY

## 2019-10-22 PROCEDURE — 3074F SYST BP LT 130 MM HG: CPT | Mod: CPTII,S$GLB,, | Performed by: OTOLARYNGOLOGY

## 2019-10-22 PROCEDURE — 31575 PR LARYNGOSCOPY, FLEXIBLE; DIAGNOSTIC: ICD-10-PCS | Mod: S$GLB,,, | Performed by: OTOLARYNGOLOGY

## 2019-10-22 PROCEDURE — 3074F PR MOST RECENT SYSTOLIC BLOOD PRESSURE < 130 MM HG: ICD-10-PCS | Mod: CPTII,S$GLB,, | Performed by: OTOLARYNGOLOGY

## 2019-10-22 PROCEDURE — 99999 PR PBB SHADOW E&M-EST. PATIENT-LVL III: ICD-10-PCS | Mod: PBBFAC,,, | Performed by: OTOLARYNGOLOGY

## 2019-10-22 PROCEDURE — 3079F PR MOST RECENT DIASTOLIC BLOOD PRESSURE 80-89 MM HG: ICD-10-PCS | Mod: CPTII,S$GLB,, | Performed by: OTOLARYNGOLOGY

## 2019-10-22 PROCEDURE — 3079F DIAST BP 80-89 MM HG: CPT | Mod: CPTII,S$GLB,, | Performed by: OTOLARYNGOLOGY

## 2019-10-22 PROCEDURE — 31575 DIAGNOSTIC LARYNGOSCOPY: CPT | Mod: S$GLB,,, | Performed by: OTOLARYNGOLOGY

## 2019-10-22 PROCEDURE — 99999 PR PBB SHADOW E&M-EST. PATIENT-LVL III: CPT | Mod: PBBFAC,,, | Performed by: OTOLARYNGOLOGY

## 2019-10-22 PROCEDURE — 99204 PR OFFICE/OUTPT VISIT, NEW, LEVL IV, 45-59 MIN: ICD-10-PCS | Mod: 25,S$GLB,, | Performed by: OTOLARYNGOLOGY

## 2019-10-22 PROCEDURE — 99204 OFFICE O/P NEW MOD 45 MIN: CPT | Mod: 25,S$GLB,, | Performed by: OTOLARYNGOLOGY

## 2019-10-22 RX ORDER — SODIUM CHLORIDE 0.9 % (FLUSH) 0.9 %
10 SYRINGE (ML) INJECTION
Status: CANCELLED | OUTPATIENT
Start: 2019-10-22

## 2019-10-22 RX ORDER — LIDOCAINE HYDROCHLORIDE 10 MG/ML
1 INJECTION, SOLUTION EPIDURAL; INFILTRATION; INTRACAUDAL; PERINEURAL ONCE
Status: CANCELLED | OUTPATIENT
Start: 2019-10-22 | End: 2019-10-22

## 2019-10-22 NOTE — LETTER
October 24, 2019      Serena Blount MD  [address]  [address]  [facility] [address]           [facility]  [address]  [facility] [address]  Phone: [phone]  Fax: [phone]          Patient: Jerrod Mendez   MR Number: [id number]   YOB: 1955   Date of Visit: 10/22/2019       Dear Dr. Serena Blount:    Thank you for referring Jerrod Mendez to me for evaluation. Attached you will find relevant portions of my assessment and plan of care.    If you have questions, please do not hesitate to call me. I look forward to following Jerrod Mendez along with you.    Sincerely,    Stu Romano MD    Enclosure  CC:  No Recipients    If you would like to receive this communication electronically, please contact [email or web address] or [phone] to request more information on [employer] access.    For providers and/or their staff who would like to refer a patient to [facility], please contact us through our one-stop-shop provider referral line, [facility], at [phone].    If you feel you have received this communication in error or would no longer like to receive these types of communications, please e-mail [email or web address]

## 2019-10-23 ENCOUNTER — ANESTHESIA EVENT (OUTPATIENT)
Dept: SURGERY | Facility: HOSPITAL | Age: 64
DRG: 821 | End: 2019-10-23
Payer: COMMERCIAL

## 2019-10-23 ENCOUNTER — TELEPHONE (OUTPATIENT)
Dept: PREADMISSION TESTING | Facility: HOSPITAL | Age: 64
End: 2019-10-23

## 2019-10-23 DIAGNOSIS — Z01.818 PREOPERATIVE TESTING: Primary | ICD-10-CM

## 2019-10-23 DIAGNOSIS — E11.649 UNCONTROLLED TYPE 2 DIABETES MELLITUS WITH HYPOGLYCEMIA WITHOUT COMA: ICD-10-CM

## 2019-10-23 NOTE — TELEPHONE ENCOUNTER
----- Message from Shakira Ackerman RN sent at 10/23/2019 10:21 AM CDT -----  11/6/19 PLEASE CALL PATIENT AND SCHEDULE [facility]/POC/LAB.                                               THANKS

## 2019-10-23 NOTE — ANESTHESIA PREPROCEDURE EVALUATION
[facility]  Anesthesia Pre-Operative Evaluation         Patient Name: Jerrod Mendez  YOB: 1955  MRN: [id number]    SUBJECTIVE:     Pre-operative evaluation for Procedure(s) (LRB):  DISSECTION, NECK (Right)     11/05/2019    Jerrod Mendez is a 64 y.o. male w/ a significant PMHx of HTN (on Coreg), HLD, T2DM, CAD (s/p PCI LAD stent x1; on ASA), HFpEF (EF 55%), TIA, JIM, CKD 4, hypothyroidism, obesity (BMI 35).  Pt with SCC of right tonsil (s/p chemoradiation.  He was noted to have supraclavicular LAD and FNA suspicious for SCC.    Patient now presents for the above procedure(s).      LDA:        Port A Cath Single Lumen 12/14/18 0800 (Active)   Number of days: 326     Prev airway: None documented.    Drips: None documented.      Patient Active Problem List   Diagnosis    Obstructive sleep apnea (adult) (pediatric)    TIA involving carotid artery    Essential hypertension    Type 2 diabetes mellitus with diabetic neuropathy, without long-term current use of insulin    CAD (coronary artery disease)    Hyperlipidemia    Neck mass    Anterior communicating artery aneurysm    Tonsillar cancer    Dysphagia, oropharyngeal    History of transient ischemic attack    Thoracic back sprain    Low back pain    Dysphagia    Hypertensive disorder    Hyperlipidemia    Sleep apnea    Intracranial aneurysm    Transient cerebral ischemia    Tonsillar mass    Neoplasm of tonsil    Gout    Encounter for education    CINV (chemotherapy-induced nausea and vomiting)    Dehydration, severe    Acute right-sided back pain    Pancytopenia    Elevated serum creatinine    Poor appetite    Oropharyngeal dysphagia    Fatigue    History of hematuria    Drug-induced membranous glomerulonephritis    Lipoma of skin    Obesity with body mass index 30 or greater    Onychomycosis    Sprain of rotator cuff capsule    Tinea corporis    Urolithiasis    CKD (chronic kidney disease)  stage 3, GFR 30-59 ml/min    Flank pain    Supraclavicular lymphadenopathy    Coronary angioplasty status    Hypothyroid    Elevated bilirubin    Enlarged prostate    Edema       Review of patient's allergies indicates:  No Known Allergies    Current Inpatient Medications:      No current facility-administered medications on file prior to encounter.      Current Outpatient Medications on File Prior to Encounter   Medication Sig Dispense Refill    aspirin 81 MG Chew Take 81 mg by mouth once daily. Coated ASA      carvedilol (COREG) 6.25 MG tablet Take half tab a day for 1 week then take 1 tab daily thereafter (Patient taking differently: Take 3.125 mg by mouth 2 (two) times daily. Take half tab a day for 1 week then take 1 tab daily thereafter) 60 tablet 11    glipiZIDE (GLUCOTROL) 5 MG tablet Take 10 mg by mouth daily with breakfast.       HYDROcodone-acetaminophen (NORCO) 5-325 mg per tablet hydrocodone 5 mg-acetaminophen 325 mg tablet      levothyroxine (SYNTHROID) 50 MCG tablet Take 50 mcg by mouth once daily.  3    pioglitazone (ACTOS) 45 MG tablet Take 45 mg by mouth once daily.      simvastatin (ZOCOR) 40 MG tablet Take 40 mg by mouth every evening.      indomethacin (INDOCIN) 50 MG capsule indomethacin 50 mg capsule   TAKE ONE CAPSULE BY MOUTH 3 TIMES A DAY AS NEEDED FOR 5 DAYS      nitroGLYCERIN (NITROSTAT) 0.3 MG SL tablet Place 0.3 mg under the tongue every 5 (five) minutes as needed for Chest pain.         Past Surgical History:   Procedure Laterality Date    CHOLECYSTECTOMY      CORONARY ANGIOPLASTY WITH STENT PLACEMENT      ESOPHAGOGASTRODUODENOSCOPY      FINE NEEDLE ASPIRATION Right     neck mass    GASTROSTOMY TUBE PLACEMENT      LIPOMA RESECTION      back    PEG TUBE REMOVAL N/A 5/28/2019    Procedure: REMOVAL, PEG TUBE;  Surgeon: Milton Friedman MD;  Location: [facility];  Service: Endoscopy;  Laterality: N/A;    PORTACATH PLACEMENT         Social History     Socioeconomic  History    Marital status:      Spouse name: Not on file    Number of children: Not on file    Years of education: Not on file    Highest education level: Not on file   Occupational History    Not on file   Social Needs    Financial resource strain: Not on file    Food insecurity:     Worry: Not on file     Inability: Not on file    Transportation needs:     Medical: Not on file     Non-medical: Not on file   Tobacco Use    Smoking status: Former Smoker     Types: Cigarettes     Last attempt to quit:      Years since quittin.8    Smokeless tobacco: Former User    Tobacco comment: quit     Substance and Sexual Activity    Alcohol use: Yes     Comment: one beer a month    Drug use: No    Sexual activity: Not on file     Comment:    Lifestyle    Physical activity:     Days per week: Not on file     Minutes per session: Not on file    Stress: Not on file   Relationships    Social connections:     Talks on phone: Not on file     Gets together: Not on file     Attends Jainism service: Not on file     Active member of club or organization: Not on file     Attends meetings of clubs or organizations: Not on file     Relationship status: Not on file   Other Topics Concern    Not on file   Social History Narrative    Not on file       OBJECTIVE:     Vital Signs Range (Last 24H):  Temp:  [36.9 °C (98.4 °F)]   Pulse:  [72]   Resp:  [16]   BP: (146)/(78)   SpO2:  [99 %]       Significant Labs:  Lab Results   Component Value Date    WBC 4.70 10/03/2019    HGB 11.8 (L) 10/03/2019    HCT 35.0 (L) 10/03/2019     10/03/2019    CHOL 156 2018    TRIG 132 2018    HDL 52 2018    ALT 25 10/03/2019    AST 24 10/03/2019     10/03/2019    K 4.6 10/03/2019     10/03/2019    CREATININE 2.30 (H) 10/03/2019    BUN 34 (H) 10/03/2019    CO2 24 10/03/2019    TSH 6.63 (H) 2019    INR 1.0 2019    HGBA1C 5.8 (H) 2019       Diagnostic Studies:    EXAMINATION:  CT CHEST WITHOUT CONTRAST    CLINICAL HISTORY:  Localized swelling, mass and lump, headnew node found in neck;    CT/Cardiac Nuclear exams in prior 12 months: 6    TECHNIQUE:  CT chest without IV contrast.  Iterative reconstruction utilized.  Coronal reformats prepared.    COMPARISON:  12/13/2018    FINDINGS:  Sub 5 mm right lung nodules (33, 46) unchanged.    No new or enlarging nodules.    No enlarged lymph nodes.    No acute infiltrates, pleural or pericardial fluid.      Impression       Two sub 5 mm right lung nodules, unchanged.    No new or enlarging lesions detected within the chest      Electronically signed by: Eugenia Duran MD  Date: 10/03/2019  Time: 16:39         EKG:   Results for orders placed or performed during the hospital encounter of 11/22/18   EKG 12-lead    Collection Time: 11/22/18  8:56 PM    Narrative    Test Reason : R47.1  Blood Pressure : **/** mmHG  Vent. Rate : 112 BPM     Atrial Rate : 112 BPM     P-R Int : 168 ms          QRS Dur : 088 ms      QT Int : 336 ms       P-R-T Axes : 055 -03 056 degrees     QTc Int : 458 ms    Sinus tachycardia  Otherwise normal ECG  When compared with ECG of 22-MAY-2015 13:07,  Vent. rate has increased BY  48 BPM  Confirmed by Maria Elena Weiner MD ([id number]) on 11/23/2018 12:53:15 PM    Referred By: [email or web address]   SELF           Confirmed By:Maria Elena Weiner MD       2D ECHO:  TTE:  Results for orders placed or performed during the hospital encounter of 11/22/18   Transthoracic echo (TTE) complete (Cupid Only)   Result Value Ref Range    AV mean gradient 3.68 mmHg    Ao peak albaro 1.25 m/s    Ao VTI 21.87 cm    IVS 1.07 0.6 - 1.1 cm    LA size 2.84 cm    Left Atrium Major Axis 5.79 cm    Left Atrium Minor Axis 5.96 cm    LVIDD 4.58 3.5 - 6.0 cm    LVIDS 3.27 2.1 - 4.0 cm    LVOT diameter 1.96 cm    LVOT peak VTI 20.24 cm    PW 1.02 0.6 - 1.1 cm    MV Peak A Albaro 0.79 m/s    E wave decelartion time 206.64 msec    MV Peak E Albaro 0.58 m/s    PV Peak D Albaro 0.38  m/s    PV Peak S Albaro 0.59 m/s    RA Major Axis 4.96 cm    TR Max Albaro 2.19 m/s    LA WIDTH 3.91 cm    Ao root annulus 3.61 cm    AORTIC VALVE CUSP SEPERATION 2.10 cm    PV PEAK VELOCITY 1.02 cm/s    LV Diastolic Volume 96.45 mL    LV Systolic Volume 43.26 mL    FS 29 %    LA volume 55.44 cm3    LV mass 167.56 g    Left Ventricle Relative Wall Thickness 0.45 cm    AV valve area 2.79 cm2    AV index (prosthetic) 0.93     E/A ratio 0.73     Pulm vein S/D ratio 1.55     LVOT area 3.02 cm2    LVOT stroke volume 61.04 cm3    AV peak gradient 6.25 mmHg    Triscuspid Valve Regurgitation Peak Gradient 19.18 mmHg    BSA 2.42 m2    LV Systolic Volume Index 17.9 mL/m2    LV Diastolic Volume Index 39.86 mL/m2    LA Volume Index 22.9 mL/m2    LV Mass Index 69.2 g/m2    LV LATERAL E/E' RATIO 7.25     Right Atrial Pressure (from IVC) 3 mmHg    TDI LATERAL 0.08     TV rest pulmonary artery pressure 22.18 mmHg    Narrative    · Normal left ventricular systolic function. The estimated ejection   fraction is 55%  · Grade I (mild) left ventricular diastolic dysfunction consistent with   impaired relaxation.  · Normal right ventricular systolic function.  · Mild aortic regurgitation.  · The estimated PA systolic pressure is 22.18 mm Hg          FAIZAN:  No results found for this or any previous visit.    ASSESSMENT/PLAN:       Shakira Ackerman RN   Registered Nurse      Pre Admission Screening   Signed              []Hide copied text    []Hover for details  Anesthesia Assessment: Preoperative EQUATION     Planned Procedure: Procedure(s) (LRB):  DISSECTION, NECK (Right)  Requested Anesthesia Type:General  Surgeon: Stu Romano MD  Service: ENT  Known or anticipated Date of Surgery:11/6/2019     Surgeon notes: reviewed     Electronic QUestionnaire Assessment completed via nurse interview with patient.         NO AQ           Triage considerations:      The patient has no apparent active cardiac condition (No unstable coronary  Syndrome such as severe unstable angina or recent [<1 month] myocardial infarction, decompensated CHF, severe valvular   disease or significant arrhythmia)     Previous anesthesia records:GETA, MAC and No problems  Airway/Jaw/Neck:  Airway Findings: Mouth Opening: Normal Tongue: Normal  General Airway Assessment: Adult  Mallampati: II TM Distance: 4 - 6 cm  Jaw/Neck Findings:  Neck ROM: Normal ROM      Last PCP note: within 1 month , outside [facility]   Subspecialty notes: ENT, Hematology/Oncology     Other important co-morbidities: CAD, DM, GOUT, HLD, HTN, JIM, CVA, TIA     Tests already available:  Available tests,  within 1 month , within [facility] .10/3/19-CMP 11/22/18-EKG 11/23/18-ECHO (EF 55%)                            Instructions given. (See in Nurse's note)     Optimization:  Anesthesia Preop Clinic Assessment  Indicated-WILL SCHEDULE POC    Medical Opinion Indicated                            Sub-specialist consult indicated:   TBRACHAEL ALCARAZ     Plan:    Testing:  A1C and T&S   Pre-anesthesia  visit                                        Visit focus: concerns in complex and/or prolonged anesthesia                           Consultation:IM Perioperative Hospitalist                           Patient  has previously scheduled Medical Appointment:NOT AT THIS TIME     Navigation: Tests Scheduled.                         Consults scheduled.                        Results will be tracked by Preop Clinic.                                                                                                                     10/23/2019  Jerrod Mendez is a 64 y.o., male.    11/5/19-Per  Patient is optimized for surgery. Labs reviewed and noted. No deviation from baseline. LONG [facility]     Anesthesia Evaluation    I have reviewed the Patient Summary Reports.    I have reviewed the Nursing Notes.      Review of Systems  Anesthesia Hx:  No problems with previous Anesthesia  History of prior surgery of interest  to airway management or planning: Previous anesthesia: General 5/2019 PEG tube removed with general anesthesia.  Procedure performed at an [facility] Facility. Denies Family Hx of Anesthesia complications.   Denies Personal Hx of Anesthesia complications.   Social:  Former Smoker, Social Alcohol Use    Hematology/Oncology:         -- Anemia: Hematology Comments: H/H 35 & 11.8 10/3/19 Current/Recent Cancer. (cervical lymph node)  --  Cancer in past history (right tonsillar cancer-treated with radiation and chemo):  chemotherapy and radiation  Oncology Comments: Right tonsillar SCC     EENT/Dental:  EENT/Dental Normal Tonsillar cancer Throat Symptoms  include Swallowing difficulty or pain    Cardiovascular:   Hypertension CAD    Denies Angina. hyperlipidemia 11/2018 echo EF 55% Functional Capacity good / => 4 METS  Coronary Artery Disease: S/P Percutaneous Coronary Intervention (PCI) coronary stent, unknown type, stent, unknown type was 7 yrs ago, currently on aspirin.  Valvular Heart Disease: Aortic Regurgitation (AR), mild   Congestive Heart Failure (CHF) , Chronic Congestive Heart Failure , LV Diastolic HF Hypertension , Well Controlled on Rx , Recent typical clinic B/P of 146/78    Pulmonary:   Denies Shortness of breath.  Denies Recent URI. Sleep Apnea  Obstructive Sleep Apnea (JIM), CPAP prescribed.   Renal/:   Chronic Renal Disease, CRI  Kidney Function/Disease, Chronic Kidney Disease (CKD) (worsened during chemo-last BUN 34/creat 2.3) , CKD Stage III (GFR 30-59)    Hepatic/GI:  Hepatic/GI Normal  Hernia, Incisional Hernia   Musculoskeletal:  Musculoskeletal Normal  Musculoskeletal General/Symptoms: low back pain. Functional capacity is ambulatory without assistance.    Neurological:   TIA, Denies Seizures.  Denies CVA - Cerebrovasular Accident  TIA - Transient Ischemic Attack , Most recent TIA was on 11/2018 , has had 1 TIA    Endocrine:   Diabetes, type 2 Hypothyroidism  Diabetes, Type 2 Diabetes , controlled  by oral hypoglycemics. Typical AM glucose range: 120  Thyroid Disease Hypothyroidism    Dermatological:  Skin Normal    Psych:  Psychiatric Normal           Physical Exam  General:  Well nourished Small beard    Airway/Jaw/Neck:  Airway Findings: Mouth Opening: Normal Tongue: Normal  Jaw/Neck Findings:  Neck ROM: Normal ROM  Neck Findings:  Girth Increased      Dental:  Dental Findings: In tact   Chest/Lungs:  Chest/Lungs Findings: Clear to auscultation     Heart/Vascular:  Heart Findings: Rate: Normal  Rhythm: Regular Rhythm  Sounds: Normal        Mental Status:  Mental Status Findings:  Cooperative, Alert and Oriented         Anesthesia Plan  Type of Anesthesia, risks & benefits discussed:  Anesthesia Type:  general  Patient's Preference: General  Intra-op Monitoring Plan: standard ASA monitors  Intra-op Monitoring Plan Comments:   Post Op Pain Control Plan: multimodal analgesia, IV/PO Opioids PRN and per primary service following discharge from PACU  Post Op Pain Control Plan Comments:   Induction:   IV  Beta Blocker:  Patient is not currently on a Beta-Blocker (No further documentation required).       Informed Consent: Patient understands risks and agrees with Anesthesia plan.  Questions answered. Anesthesia consent signed with patient.  ASA Score: 3     Day of Surgery Review of History & Physical: I have interviewed and examined the patient. I have reviewed the patient's H&P dated:  There are no significant changes.  H&P update referred to the surgeon.         Ready For Surgery From Anesthesia Perspective.     11/5/19 attempted to get last visit note/cards testing from [facility]/Dr Shanks pt no longer in system since last visit was over 6 yrs ago.

## 2019-10-23 NOTE — PRE ADMISSION SCREENING
Anesthesia Assessment: Preoperative EQUATION    Planned Procedure: Procedure(s) (LRB):  DISSECTION, NECK (Right)  Requested Anesthesia Type:General  Surgeon: Stu Romano MD  Service: ENT  Known or anticipated Date of Surgery:11/6/2019    Surgeon notes: reviewed    Electronic QUestionnaire Assessment completed via nurse interview with patient.        NO AQ        Triage considerations:     The patient has no apparent active cardiac condition (No unstable coronary Syndrome such as severe unstable angina or recent [<1 month] myocardial infarction, decompensated CHF, severe valvular   disease or significant arrhythmia)    Previous anesthesia records:GETA, MAC and No problems  Airway/Jaw/Neck:  Airway Findings: Mouth Opening: Normal Tongue: Normal  General Airway Assessment: Adult  Mallampati: II TM Distance: 4 - 6 cm  Jaw/Neck Findings:  Neck ROM: Normal ROM      Last PCP note: within 1 month , outside [facility]   Subspecialty notes: ENT, Hematology/Oncology    Other important co-morbidities: CAD, DM, GOUT, HLD, HTN, JIM, CVA, TIA     Tests already available:  Available tests,  within 1 month , within [facility] .10/3/19-CMP 11/22/18-EKG            Instructions given. (See in Nurse's note)    Optimization:  Anesthesia Preop Clinic Assessment  Indicated-WILL SCHEDULE POC    Medical Opinion Indicated       Sub-specialist consult indicated:   TBCB LISSA    Plan:    Testing:  A1C and T&S   Pre-anesthesia  visit       Visit focus: concerns in complex and/or prolonged anesthesia     Consultation:IM Perioperative Hospitalist     Patient  has previously scheduled Medical Appointment:NOT AT THIS TIME    Navigation: Tests Scheduled.              Consults scheduled.             Results will be tracked by Preop Clinic.

## 2019-10-24 NOTE — PROGRESS NOTES
Chief Complaint   Patient presents with    Discuss resection of lymph nodes        Tonsillar cancer    12/5/2018 Initial Diagnosis     Tonsillar cancer      10/8/2019 -  Chemotherapy     Treatment Summary   Plan Name: OP HEAD AND NECK PEMBROLIZUMAB FLUOROURACIL CARBOPLATIN Q3W  Treatment Goal: Control  Status: Active  Start Date: 10/21/2019 (Planned)  End Date: 10/4/2021 (Planned)  Provider: Serena Blount MD  Chemotherapy: CARBOplatin (PARAPLATIN) in sodium chloride 0.9% 250 mL chemo infusion,  (original dose ), Intravenous, Clinic/HOD 1 time, 0 of 6 cycles  Dose modification:   (Cycle 1)  fluorouracil (ADRUCIL) in sodium chloride 0.9% 240 mL chemo infusion, 1,000 mg/m2/day (original dose ), Intravenous, Over 96 hours, 0 of 6 cycles  Dose modification: 1,000 mg/m2/day (Cycle 1)  pembrolizumab (KEYTRUDA) 200 mg in sodium chloride 0.9% 100 mL chemo infusion, 200 mg, Intravenous, Clinic/HOD 1 time, 0 of 35 cycles           HPI   64 y.o. 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  He was recently noted to have right supraclavicular adenopathy.  FNA was suspicious for squamous cell carcinoma.  He has no significant complaints today.  CT scans of the chest, abdomen and pelvis without evidence of metastatic disease. He presents today to discuss surgical options.    Review of Systems   Constitutional: Negative for fatigue and unexpected weight change.   HENT: Per HPI.  Eyes: Negative for visual disturbance.   Respiratory: Negative for shortness of breath, hemoptysis   Cardiovascular: Negative for chest pain and palpitations.   Musculoskeletal: Negative for decreased ROM, back pain.   Skin: Negative for rash, sunburn, itching.   Neurological: Negative for dizziness and seizures.   Hematological: Negative for adenopathy. Does not bruise/bleed easily.   Endocrine: Negative for rapid weight loss/weight gain, heat/cold  intolerance.     Past Medical History   Patient Active Problem List   Diagnosis    Obstructive sleep apnea (adult) (pediatric)    TIA involving carotid artery    Essential hypertension    Type II diabetes mellitus, uncontrolled    CAD (coronary artery disease)    Hyperlipidemia    Neck mass    Anterior communicating artery aneurysm    Tonsillar cancer    Dysphagia, oropharyngeal    History of transient ischemic attack    Thoracic back sprain    Low back pain    Dysphagia    Hypertensive disorder    Hyperlipidemia    Sleep apnea    Intracranial aneurysm    Transient cerebral ischemia    Tonsillar mass    Neoplasm of tonsil    Dizziness and giddiness    Dysarthria    Gout    Encounter for education    CINV (chemotherapy-induced nausea and vomiting)    S/P percutaneous endoscopic gastrostomy (PEG) tube placement    Dehydration, severe    Acute right-sided back pain    Pancytopenia    Elevated serum creatinine    Poor appetite    Oropharyngeal dysphagia    Fatigue    Acute renal failure syndrome    History of hematuria    Drug-induced membranous glomerulonephritis    Lipoma of skin    Obesity with body mass index 30 or greater    Onychomycosis    Sprain of rotator cuff capsule    Tinea corporis    Urolithiasis    CKD (chronic kidney disease) stage 3, GFR 30-59 ml/min    Flank pain    Supraclavicular lymphadenopathy           Past Surgical History   Past Surgical History:   Procedure Laterality Date    CHOLECYSTECTOMY      CORONARY ANGIOPLASTY WITH STENT PLACEMENT      ESOPHAGOGASTRODUODENOSCOPY      FINE NEEDLE ASPIRATION Right     neck mass    GASTROSTOMY TUBE PLACEMENT      LIPOMA RESECTION      back    PEG TUBE REMOVAL N/A 5/28/2019    Procedure: REMOVAL, PEG TUBE;  Surgeon: Milton Friedman MD;  Location: [facility];  Service: Endoscopy;  Laterality: N/A;    PORTACATH PLACEMENT           Family History   Family History   Problem Relation Age of Onset     Diabetes Mother     Hypertension Mother     Cancer Father     Heart attack Father     Heart disease Paternal Grandmother            Social History   .  Social History     Socioeconomic History    Marital status:      Spouse name: Not on file    Number of children: Not on file    Years of education: Not on file    Highest education level: Not on file   Occupational History    Not on file   Social Needs    Financial resource strain: Not on file    Food insecurity:     Worry: Not on file     Inability: Not on file    Transportation needs:     Medical: Not on file     Non-medical: Not on file   Tobacco Use    Smoking status: Former Smoker     Types: Cigarettes    Smokeless tobacco: Former User    Tobacco comment: Quit 8 years ago   Substance and Sexual Activity    Alcohol use: Yes     Comment: socially    Drug use: No    Sexual activity: Not on file     Comment:    Lifestyle    Physical activity:     Days per week: Not on file     Minutes per session: Not on file    Stress: Not on file   Relationships    Social connections:     Talks on phone: Not on file     Gets together: Not on file     Attends Sikh service: Not on file     Active member of club or organization: Not on file     Attends meetings of clubs or organizations: Not on file     Relationship status: Not on file   Other Topics Concern    Not on file   Social History Narrative    Not on file         Allergies   Review of patient's allergies indicates:  No Known Allergies        Physical Exam     Vitals:    10/22/19 1614   BP: 127/80   Pulse: 72   Temp: 98.1 °F (36.7 °C)         Body mass index is 35.42 kg/m².      General: AOx3, NAD   Respiratory:  Symmetric chest rise, normal effort  Right Ear: External Auditory Canal WNL,TM w/o masses/lesions/perforations.  Middle ear without evidence of effusion.   Left Ear: External Auditory Canal WNL,TM w/o masses/lesions/perforations.  Middle ear without evidence of effusion.    Nose: No gross nasal septal deviation. Inferior Turbinates WNL bilaterally. No septal perforation. No masses/lesions.   Oral Cavity:  Oral Tongue mobile, no lesions noted. Hard Palate WNL. No buccal or FOM lesions.  Oropharynx:  No masses/lesions of the posterior pharyngeal wall. Tonsillar fossa without lesions. Soft palate without masses. Midline uvula.   Neck: No scars.  Roughly 2 cm firm, mobile conglomeration of lymph nodes in right level 5B.  No thyromegaly or thyroid nodules.  Normal range of motion.    Face: House Brackmann I bilaterally.     Flex Naso Coretta Hypo Procedures #2    Procedure:  Diagnostic flexible nasopharyngoscopy, laryngoscopy and hypopharyngoscopy:    Routine preparation with local atomizer with 1% neosynephrine/pontocaine with customary flexible endoscope.    Nasopharynx:  No lesions.   Mucosa:  No lesions.   Adenoids:  Present.  Posterior Choanae:  Patent.  Eustachian Tubes:  Patent.  Posterior pharynx:  No lesions.  Larynx/hypopharynx:   Epiglottis:  No lesions, without edema.   AE Folds:  No lesions.   Vocal cords:  No polyps, nodules, ulcers or lesions.   Mobility:  Equal and normal bilateral.   Hypopharynx:  No lesions.   Piriform sinus:  No pooling, no lesions.   Post Cricoid:  No erythema, no edema.    MRI neck reviewed.    Assessment/Plan  Problem List Items Addressed This Visit        Oncology    Tonsillar cancer     Squamous cell carcinoma the right tonsil status post chemoradiation therapy completed earlier this year.  He has evidence of metastatic disease the right neck.  His disease does appear to be resectable and I recommended that we proceed with right salvage neck dissection.    We discussed the risks, benefits, and indications to right neck dissection. The risks were noted to include, but not be limited to, infection, bleeding, scarring, collection of blood or tissue fluid requiring drainage, weakness of the lip which may be temporary or permanent, weakness of the tongue  which could be temporary or permanent and cause speech and/or swallowing difficulty, weakness of the shoulder which could be temporary or permanent, pain, chyle leakage which would require dietary modification and perhaps additional procedures, and the need for additional procedures. Time was allowed for questions, and all questions were answered to the patient's apparent satisfaction.      Surgery is scheduled on November 6, 2019.             Other Visit Diagnoses     Secondary malignant neoplasm of cervical lymph node    -  Primary    Relevant Orders    Case Request Operating Room: DISSECTION, NECK (Completed)

## 2019-10-24 NOTE — H&P (VIEW-ONLY)
Chief Complaint   Patient presents with    Discuss resection of lymph nodes        Tonsillar cancer    12/5/2018 Initial Diagnosis     Tonsillar cancer      10/8/2019 -  Chemotherapy     Treatment Summary   Plan Name: OP HEAD AND NECK PEMBROLIZUMAB FLUOROURACIL CARBOPLATIN Q3W  Treatment Goal: Control  Status: Active  Start Date: 10/21/2019 (Planned)  End Date: 10/4/2021 (Planned)  Provider: Serena Blount MD  Chemotherapy: CARBOplatin (PARAPLATIN) in sodium chloride 0.9% 250 mL chemo infusion,  (original dose ), Intravenous, Clinic/HOD 1 time, 0 of 6 cycles  Dose modification:   (Cycle 1)  fluorouracil (ADRUCIL) in sodium chloride 0.9% 240 mL chemo infusion, 1,000 mg/m2/day (original dose ), Intravenous, Over 96 hours, 0 of 6 cycles  Dose modification: 1,000 mg/m2/day (Cycle 1)  pembrolizumab (KEYTRUDA) 200 mg in sodium chloride 0.9% 100 mL chemo infusion, 200 mg, Intravenous, Clinic/HOD 1 time, 0 of 35 cycles           HPI   64 y.o. 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  He was recently noted to have right supraclavicular adenopathy.  FNA was suspicious for squamous cell carcinoma.  He has no significant complaints today.  CT scans of the chest, abdomen and pelvis without evidence of metastatic disease. He presents today to discuss surgical options.    Review of Systems   Constitutional: Negative for fatigue and unexpected weight change.   HENT: Per HPI.  Eyes: Negative for visual disturbance.   Respiratory: Negative for shortness of breath, hemoptysis   Cardiovascular: Negative for chest pain and palpitations.   Musculoskeletal: Negative for decreased ROM, back pain.   Skin: Negative for rash, sunburn, itching.   Neurological: Negative for dizziness and seizures.   Hematological: Negative for adenopathy. Does not bruise/bleed easily.   Endocrine: Negative for rapid weight loss/weight gain, heat/cold  intolerance.     Past Medical History   Patient Active Problem List   Diagnosis    Obstructive sleep apnea (adult) (pediatric)    TIA involving carotid artery    Essential hypertension    Type II diabetes mellitus, uncontrolled    CAD (coronary artery disease)    Hyperlipidemia    Neck mass    Anterior communicating artery aneurysm    Tonsillar cancer    Dysphagia, oropharyngeal    History of transient ischemic attack    Thoracic back sprain    Low back pain    Dysphagia    Hypertensive disorder    Hyperlipidemia    Sleep apnea    Intracranial aneurysm    Transient cerebral ischemia    Tonsillar mass    Neoplasm of tonsil    Dizziness and giddiness    Dysarthria    Gout    Encounter for education    CINV (chemotherapy-induced nausea and vomiting)    S/P percutaneous endoscopic gastrostomy (PEG) tube placement    Dehydration, severe    Acute right-sided back pain    Pancytopenia    Elevated serum creatinine    Poor appetite    Oropharyngeal dysphagia    Fatigue    Acute renal failure syndrome    History of hematuria    Drug-induced membranous glomerulonephritis    Lipoma of skin    Obesity with body mass index 30 or greater    Onychomycosis    Sprain of rotator cuff capsule    Tinea corporis    Urolithiasis    CKD (chronic kidney disease) stage 3, GFR 30-59 ml/min    Flank pain    Supraclavicular lymphadenopathy           Past Surgical History   Past Surgical History:   Procedure Laterality Date    CHOLECYSTECTOMY      CORONARY ANGIOPLASTY WITH STENT PLACEMENT      ESOPHAGOGASTRODUODENOSCOPY      FINE NEEDLE ASPIRATION Right     neck mass    GASTROSTOMY TUBE PLACEMENT      LIPOMA RESECTION      back    PEG TUBE REMOVAL N/A 5/28/2019    Procedure: REMOVAL, PEG TUBE;  Surgeon: Milton Friedman MD;  Location: [facility];  Service: Endoscopy;  Laterality: N/A;    PORTACATH PLACEMENT           Family History   Family History   Problem Relation Age of Onset     Diabetes Mother     Hypertension Mother     Cancer Father     Heart attack Father     Heart disease Paternal Grandmother            Social History   .  Social History     Socioeconomic History    Marital status:      Spouse name: Not on file    Number of children: Not on file    Years of education: Not on file    Highest education level: Not on file   Occupational History    Not on file   Social Needs    Financial resource strain: Not on file    Food insecurity:     Worry: Not on file     Inability: Not on file    Transportation needs:     Medical: Not on file     Non-medical: Not on file   Tobacco Use    Smoking status: Former Smoker     Types: Cigarettes    Smokeless tobacco: Former User    Tobacco comment: Quit 8 years ago   Substance and Sexual Activity    Alcohol use: Yes     Comment: socially    Drug use: No    Sexual activity: Not on file     Comment:    Lifestyle    Physical activity:     Days per week: Not on file     Minutes per session: Not on file    Stress: Not on file   Relationships    Social connections:     Talks on phone: Not on file     Gets together: Not on file     Attends Catholic service: Not on file     Active member of club or organization: Not on file     Attends meetings of clubs or organizations: Not on file     Relationship status: Not on file   Other Topics Concern    Not on file   Social History Narrative    Not on file         Allergies   Review of patient's allergies indicates:  No Known Allergies        Physical Exam     Vitals:    10/22/19 1614   BP: 127/80   Pulse: 72   Temp: 98.1 °F (36.7 °C)         Body mass index is 35.42 kg/m².      General: AOx3, NAD   Respiratory:  Symmetric chest rise, normal effort  Right Ear: External Auditory Canal WNL,TM w/o masses/lesions/perforations.  Middle ear without evidence of effusion.   Left Ear: External Auditory Canal WNL,TM w/o masses/lesions/perforations.  Middle ear without evidence of effusion.    Nose: No gross nasal septal deviation. Inferior Turbinates WNL bilaterally. No septal perforation. No masses/lesions.   Oral Cavity:  Oral Tongue mobile, no lesions noted. Hard Palate WNL. No buccal or FOM lesions.  Oropharynx:  No masses/lesions of the posterior pharyngeal wall. Tonsillar fossa without lesions. Soft palate without masses. Midline uvula.   Neck: No scars.  Roughly 2 cm firm, mobile conglomeration of lymph nodes in right level 5B.  No thyromegaly or thyroid nodules.  Normal range of motion.    Face: House Brackmann I bilaterally.     Flex Naso Coretta Hypo Procedures #2    Procedure:  Diagnostic flexible nasopharyngoscopy, laryngoscopy and hypopharyngoscopy:    Routine preparation with local atomizer with 1% neosynephrine/pontocaine with customary flexible endoscope.    Nasopharynx:  No lesions.   Mucosa:  No lesions.   Adenoids:  Present.  Posterior Choanae:  Patent.  Eustachian Tubes:  Patent.  Posterior pharynx:  No lesions.  Larynx/hypopharynx:   Epiglottis:  No lesions, without edema.   AE Folds:  No lesions.   Vocal cords:  No polyps, nodules, ulcers or lesions.   Mobility:  Equal and normal bilateral.   Hypopharynx:  No lesions.   Piriform sinus:  No pooling, no lesions.   Post Cricoid:  No erythema, no edema.    MRI neck reviewed.    Assessment/Plan  Problem List Items Addressed This Visit        Oncology    Tonsillar cancer     Squamous cell carcinoma the right tonsil status post chemoradiation therapy completed earlier this year.  He has evidence of metastatic disease the right neck.  His disease does appear to be resectable and I recommended that we proceed with right salvage neck dissection.    We discussed the risks, benefits, and indications to right neck dissection. The risks were noted to include, but not be limited to, infection, bleeding, scarring, collection of blood or tissue fluid requiring drainage, weakness of the lip which may be temporary or permanent, weakness of the tongue  which could be temporary or permanent and cause speech and/or swallowing difficulty, weakness of the shoulder which could be temporary or permanent, pain, chyle leakage which would require dietary modification and perhaps additional procedures, and the need for additional procedures. Time was allowed for questions, and all questions were answered to the patient's apparent satisfaction.      Surgery is scheduled on November 6, 2019.             Other Visit Diagnoses     Secondary malignant neoplasm of cervical lymph node    -  Primary    Relevant Orders    Case Request Operating Room: DISSECTION, NECK (Completed)

## 2019-10-24 NOTE — ASSESSMENT & PLAN NOTE
Squamous cell carcinoma the right tonsil status post chemoradiation therapy completed earlier this year.  He has evidence of metastatic disease the right neck.  His disease does appear to be resectable and I recommended that we proceed with right salvage neck dissection.    We discussed the risks, benefits, and indications to right neck dissection. The risks were noted to include, but not be limited to, infection, bleeding, scarring, collection of blood or tissue fluid requiring drainage, weakness of the lip which may be temporary or permanent, weakness of the tongue which could be temporary or permanent and cause speech and/or swallowing difficulty, weakness of the shoulder which could be temporary or permanent, pain, chyle leakage which would require dietary modification and perhaps additional procedures, and the need for additional procedures. Time was allowed for questions, and all questions were answered to the patient's apparent satisfaction.      Surgery is scheduled on November 6, 2019.

## 2019-11-04 ENCOUNTER — TELEPHONE (OUTPATIENT)
Dept: OTOLARYNGOLOGY | Facility: CLINIC | Age: 64
End: 2019-11-04

## 2019-11-04 NOTE — DISCHARGE INSTRUCTIONS
Your surgery has been scheduled for:__________________________________________    You should report to:  ____[facility], located on the [place] side of the first floor of the           [facility] ([phone])  ____[facility], located on the [facility] side of the            Second floor of the [facility] ([phone])  ____3rd Floor [facility] located on the [facility] side of the [facility] [phone]  Please Note   - Tell your doctor if you take Aspirin, products containing Aspirin, herbal medications  or blood thinners, such as Coumadin, Ticlid, or Plavix.  (Consult your provider regarding holding or stopping before surgery).  - Arrange for someone to drive you home following surgery.  You will not be allowed to leave the surgical facility alone or drive yourself home following sedation and anesthesia.  Before Surgery  - Stop taking all herbal medications 14days prior to surgery  - No Motrin/Advil (Ibuprofen) 7 days before surgery  - No Aleve (Naproxen) 7 days before surgery  - Stop Taking Asprin, products containing Asprin _____days before surgery  - Stop taking blood thinners_______days before surgery  - No Goody's/BC  Powder 7 days before surgery  - Refrain from drinking alcoholic beverages for 24hours before and after surgery  - Stop or limit smoking _________days before surgery  - You may take Tylenol for pain  Night before Surgery  Stop ALL solid food, gum, candy (including vitamins) 8 hours before arrival time.  (Please note: If your surgeon gives you different eating and drinking instructions, please follow surgeon's directions.)  Stop all CLOUDY liquids: coffee with creamer, formula, tube feeds, cloudy juices, non-human milk and breast milk with additives, 6 hours prior to arrival time.  Stop plain breast milk 4 hours prior to arrival time.  The patient should be ENCOURAGED to drink carbohydrate-rich  clear liquids (sports drinks, clear juices) until 2 hours prior to arrival time.  CLEAR liquids include only water, black coffee NO creamer, clear oral rehydration drinks, clear sports drinks or clear fruit juices (no orange juice, no pulpy juices, no apple cider). Advise patients if they can read newsprint through the liquid, it qualifies as clear liquid.   IF IN DOUBT, drink water instead.   - Take a shower or bath (shower is recommended).  Bathe with Hibiclens soap or an antibacterial soap from the neck down.  If not supplied by your surgeon, hibiclens soap will need to be purchased over the counter in pharmacy.  Rinse soap off thoroughly.  - Shampoo your hair with your regular shampoo  The Day of Surgery  · NOTHING TO  DRINK 2 hours before arrival time. If you are told to take medication on the morning of surgery, it may be taken with a sip of water.   - Take another bath or shower with hibiclens or any antibacterial soap, to reduce the chance of infection.  - Take heart and blood pressure medications with a small sip of water, as advised by the perioperative team.  - Do not take fluid pills  - You may brush your teeth and rinse your mouth, but do not swall any additional water.   - Do not apply perfumes, powder, body lotions or deodorant on the day of surgery.  - Nail polish should be removed.  - Do not wear makeup or moisturizer  - Wear comfortable clothes, such as a button front shirt and loose fitting pants.  - Leave all jewelry, including body piercings, and valuables at home.    - Bring any devices you will neeed after surgery such as crutches or canes.  - If you have sleep apnea, please bring your CPAP machine  In the event that your physical condition changes including the onset of a cold or respiratory illness, or if you have to delay or cancel your surgery, please notify your surgeon.  Anesthesia: General Anesthesia     You are watched continuously during your procedure by your anesthesia provider.      Youre due to have surgery. During surgery, youll be given medicine called anesthesia or anesthetic. This will keep you comfortable and pain-free. Your anesthesia provider will use general anesthesia.  What is general anesthesia?  General anesthesia puts you into a state like deep sleep. It goes into the bloodstream (IV anesthetics), into the lungs (gas anesthetics), or both. You feel nothing during the procedure. You will not remember it. During the procedure, the anesthesia provider monitors you continuously. He or she checks your heart rate and rhythm, blood pressure, breathing, and blood oxygen.  · IV anesthetics. IV anesthetics are given through an IV line in your arm. Theyre often given first. This is so you are asleep before a gas anesthetic is started. Some kinds of IV anesthetics relieve pain. Others relax you. Your doctor will decide which kind is best in your case.  · Gas anesthetics. Gas anesthetics are breathed into the lungs. They are often used to keep you asleep. They can be given through a facemask or a tube placed in your larynx or trachea (breathing tube).  ? If you have a facemask, your anesthesia provider will most likely place it over your nose and mouth while youre still awake. Youll breathe oxygen through the mask as your IV anesthetic is started. Gas anesthetic may be added through the mask.  ? If you have a tube in the larynx or trachea, it will be inserted into your throat after youre asleep.  Anesthesia tools and medicines  You will likely have:  · IV anesthetics. These are put into an IV line into your bloodstream.  · Gas anesthetics. You breathe these anesthetics into your lungs, where they pass into your bloodstream.  · Pulse oximeter. This is a small clip that is attached to the end of your finger. This measures your blood oxygen level.  · Electrocardiography leads (electrodes). These are small sticky pads that are placed on your chest. They record your heart rate and  rhythm.  · Blood pressure cuff. This reads your blood pressure.  Risks and possible complications  General anesthesia has some risks. These include:  · Breathing problems  · Nausea and vomiting  · Sore throat or hoarseness (usually temporary)  · Allergic reaction to the anesthetic  · Irregular heartbeat (rare)  · Cardiac arrest (rare)   Anesthesia safety  · Follow all instructions you are given for how long not to eat or drink before your procedure.  · Be sure your doctor knows what medicines and drugs you take. This includes over-the-counter medicines, herbs, supplements, alcohol or other drugs. You will be asked when those were last taken.  · Have an adult family member or friend drive you home after the procedure.  · For the first 24 hours after your surgery:  ? Do not drive or use heavy equipment.  ? Do not make important decisions or sign legal documents. If important decisions or signing legal documents is necessary during the first 24 hours after surgery, have a trusted family member or spouse act on your behalf.  ? Avoid alcohol.  ? Have a responsible adult stay with you. He or she can watch for problems and help keep you safe.  Date Last Reviewed: 12/1/2016  © 6430-7966 [employer]. [address] [place] [address], [place] [address]. All rights reserved. This information is not intended as a substitute for professional medical care. Always follow your healthcare professional's instructions

## 2019-11-05 ENCOUNTER — TELEPHONE (OUTPATIENT)
Dept: OTOLARYNGOLOGY | Facility: CLINIC | Age: 64
End: 2019-11-05

## 2019-11-05 ENCOUNTER — HOSPITAL ENCOUNTER (OUTPATIENT)
Dept: PREADMISSION TESTING | Facility: HOSPITAL | Age: 64
Discharge: HOME | DRG: 821 | End: 2019-11-05
Attending: ANESTHESIOLOGY
Payer: COMMERCIAL

## 2019-11-05 ENCOUNTER — INITIAL CONSULT (OUTPATIENT)
Dept: INTERNAL MEDICINE | Facility: CLINIC | Age: 64
End: 2019-11-05
Payer: COMMERCIAL

## 2019-11-05 VITALS
RESPIRATION RATE: 16 BRPM | HEART RATE: 72 BPM | BODY MASS INDEX: 35.28 KG/M2 | OXYGEN SATURATION: 99 % | SYSTOLIC BLOOD PRESSURE: 146 MMHG | HEIGHT: 71 IN | DIASTOLIC BLOOD PRESSURE: 78 MMHG | TEMPERATURE: 98 F | WEIGHT: 252 LBS

## 2019-11-05 DIAGNOSIS — Z87.448 HISTORY OF HEMATURIA: ICD-10-CM

## 2019-11-05 DIAGNOSIS — I10 ESSENTIAL HYPERTENSION: ICD-10-CM

## 2019-11-05 DIAGNOSIS — Z98.61 CORONARY ANGIOPLASTY STATUS: ICD-10-CM

## 2019-11-05 DIAGNOSIS — E03.9 HYPOTHYROIDISM, UNSPECIFIED TYPE: ICD-10-CM

## 2019-11-05 DIAGNOSIS — I67.1 INTRACRANIAL ANEURYSM: ICD-10-CM

## 2019-11-05 DIAGNOSIS — R13.12 OROPHARYNGEAL DYSPHAGIA: ICD-10-CM

## 2019-11-05 DIAGNOSIS — R60.9 EDEMA, UNSPECIFIED TYPE: ICD-10-CM

## 2019-11-05 DIAGNOSIS — I25.10 CORONARY ARTERY DISEASE INVOLVING NATIVE CORONARY ARTERY OF NATIVE HEART WITHOUT ANGINA PECTORIS: ICD-10-CM

## 2019-11-05 DIAGNOSIS — C09.9 TONSILLAR CANCER: ICD-10-CM

## 2019-11-05 DIAGNOSIS — G89.29 CHRONIC LOW BACK PAIN WITH RIGHT-SIDED SCIATICA, UNSPECIFIED BACK PAIN LATERALITY: ICD-10-CM

## 2019-11-05 DIAGNOSIS — G47.30 SLEEP APNEA, UNSPECIFIED TYPE: ICD-10-CM

## 2019-11-05 DIAGNOSIS — M10.9 GOUT, UNSPECIFIED CAUSE, UNSPECIFIED CHRONICITY, UNSPECIFIED SITE: ICD-10-CM

## 2019-11-05 DIAGNOSIS — E66.9 OBESITY WITH BODY MASS INDEX 30 OR GREATER: ICD-10-CM

## 2019-11-05 DIAGNOSIS — N18.30 CKD (CHRONIC KIDNEY DISEASE) STAGE 3, GFR 30-59 ML/MIN: ICD-10-CM

## 2019-11-05 DIAGNOSIS — Z01.818 PREOP EXAMINATION: Primary | ICD-10-CM

## 2019-11-05 DIAGNOSIS — R17 ELEVATED BILIRUBIN: ICD-10-CM

## 2019-11-05 DIAGNOSIS — E78.5 HYPERLIPIDEMIA, UNSPECIFIED HYPERLIPIDEMIA TYPE: ICD-10-CM

## 2019-11-05 DIAGNOSIS — E11.40 TYPE 2 DIABETES MELLITUS WITH DIABETIC NEUROPATHY, WITHOUT LONG-TERM CURRENT USE OF INSULIN: ICD-10-CM

## 2019-11-05 DIAGNOSIS — M54.41 CHRONIC LOW BACK PAIN WITH RIGHT-SIDED SCIATICA, UNSPECIFIED BACK PAIN LATERALITY: ICD-10-CM

## 2019-11-05 DIAGNOSIS — R63.0 POOR APPETITE: ICD-10-CM

## 2019-11-05 DIAGNOSIS — N40.0 ENLARGED PROSTATE: ICD-10-CM

## 2019-11-05 DIAGNOSIS — Z86.73 HISTORY OF TRANSIENT ISCHEMIC ATTACK: ICD-10-CM

## 2019-11-05 PROBLEM — R47.1 DYSARTHRIA: Status: RESOLVED | Noted: 2018-11-26 | Resolved: 2019-11-05

## 2019-11-05 PROBLEM — Z93.1 S/P PERCUTANEOUS ENDOSCOPIC GASTROSTOMY (PEG) TUBE PLACEMENT: Status: RESOLVED | Noted: 2018-12-26 | Resolved: 2019-11-05

## 2019-11-05 PROBLEM — N17.9 ACUTE RENAL FAILURE SYNDROME: Status: RESOLVED | Noted: 2019-06-19 | Resolved: 2019-11-05

## 2019-11-05 PROBLEM — R42 DIZZINESS AND GIDDINESS: Status: RESOLVED | Noted: 2018-11-26 | Resolved: 2019-11-05

## 2019-11-05 PROCEDURE — 99204 PR OFFICE/OUTPT VISIT, NEW, LEVL IV, 45-59 MIN: ICD-10-PCS | Mod: S$GLB,,, | Performed by: HOSPITALIST

## 2019-11-05 PROCEDURE — 99999 PR PBB SHADOW E&M-EST. PATIENT-LVL I: CPT | Mod: PBBFAC,,, | Performed by: HOSPITALIST

## 2019-11-05 PROCEDURE — 99999 PR PBB SHADOW E&M-EST. PATIENT-LVL I: ICD-10-PCS | Mod: PBBFAC,,, | Performed by: HOSPITALIST

## 2019-11-05 PROCEDURE — 99204 OFFICE O/P NEW MOD 45 MIN: CPT | Mod: S$GLB,,, | Performed by: HOSPITALIST

## 2019-11-05 NOTE — ASSESSMENT & PLAN NOTE
Coreg  Home BP readings -120/70's  Recent BP readings in the record-130/70-80  Hypertension-  Blood pressure is acceptable . I suggest continuation of Coreg- during the entire perioperative period. I suggest blood pressure,monitoring.I suggest addressing pain control as uncontrolled pain can increased blood pressure

## 2019-11-05 NOTE — HPI
History of present illness- I had the pleasure of meeting this pleasant 64 y.o. gentleman in the pre op clinic prior to his elective Head and Neck surgery. The patient is new to me . Jerrod was accompanied by wife Marina.    I have obtained the history by speaking to the patient and by reviewing the electronic health records.    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   He only got two doses of high dose cisplatin due to kidney failure.   Completed radiation in Feb 2019.   Surveillance CT scan on 10/4/19 showed R supraclavicular lymphadenopathy     FNA was suspicious for squamous cell carcinoma.  He has no significant complaints today.  CT scans of the chest, abdomen and pelvis without evidence of metastatic disease  As per chart-Squamous cell carcinoma the right tonsil status post chemoradiation therapy completed Feb 2019  He has evidence of metastatic disease the right neck.     Has difficulty swallowing rice, ground meat on occasions   Has dry mouth and drinks lot of water     Relevant health conditions of significance for the perioperative period/ History of presenting illness -    Health conditions of significance for the perioperative period     DM    HTN    CKD    CAD    Lives with wife

## 2019-11-05 NOTE — ASSESSMENT & PLAN NOTE
Nov 2018  No acute intracranial abnormality.  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  Otherwise, no arterial focal high-grade stenosis or occlusion.    As per their understanding it was felt to be dsmall and no inrtervention was required , but plans on following

## 2019-11-05 NOTE — LETTER
November 5, 2019      Stu Romano MD  [address] [facility] [address]           [facility] - Pre Op Consult  [address] [facility] [address]  Phone: [phone]          Patient: Jerrod Mendez   MR Number: [id number]   YOB: 1955   Date of Visit: 11/5/2019       Dear Dr. Kirsten Romano:    Thank you for referring Jerrod Mendez to me for evaluation. Attached you will find relevant portions of my assessment and plan of care.    If you have questions, please do not hesitate to call me. I look forward to following Jerrod Mendez along with you.    Sincerely,    Valerie Gilbert MD    Enclosure  CC:  No Recipients    If you would like to receive this communication electronically, please contact [email or web address] or [phone] to request more information on [employer] access.    For providers and/or their staff who would like to refer a patient to [facility], please contact us through our one-stop-shop provider referral line, [facility], at [phone].    If you feel you have received this communication in error or would no longer like to receive these types of communications, please e-mail [email or web address]

## 2019-11-05 NOTE — ASSESSMENT & PLAN NOTE
May 2015 '  . Non obstructive coronaries noted with patent mid LAD stent. 20% proximal RCA and Proximal D1 Disease noted    CAD   Cardiac stenting 2007- had chest pressure leading to the stent   No History of CABG.  History of stenting  Chest pain leading to stent - Location- central     , Radiation-  Shoulder ? Side   , on exertion,  associated with shortness of breath, sweating, nausea,vomiting ,  diarrhea    Last Nitro use 1 year ago   Stays active , physical job, can take 1 flight of stairs- no exertoional symptoms     Beta blocker   Statin   ASA

## 2019-11-05 NOTE — ASSESSMENT & PLAN NOTE
Most recent creatinine stable since May 2019 about 2.3    Stages of CKD discussed  Deleterious effects NSAID's , Beneficial effects of Hydration discussed   Tylenol as needed for pain     I  suggest monitoring renal function, in put and out put status puma-operatively. I  suggest avoiding nephrotoxic medication including NSAIDs, COX2 inhibitors, intravenous contrast agent,avoiding hypotension to prevent further renal impairment.

## 2019-11-05 NOTE — ASSESSMENT & PLAN NOTE
Type 2 Diabetes Mellitus  On treatment with oral agents,not  Insulin    Hemoglobin A1c- pending   Capillary glucose check-1-2 times a week   Pre breakfast -100-120  Pre lunch -100's    Had high glucose in the past   DM better since chemo, weight loss, appetite loss    Neuropathy   Renal   Vascular       Diabetes Mellitus-I suggest monitoring the glucose in the perioperative period ( Before meals and bed time,if the patient is on oral feeds or every 6 hourly ,if the patient is NPO )  Blood glucose target in hospitalized patients is 140-180. Oral Hypoglycemic agents are generally avoided during the hospital stay . If glucose is consistently elevated ,I suggest using basal ,prandial Insulin regimen to control the glucose , as elevated glucose can be associated with adverse surgical out comes. Please consider involving Hospital Medicine or Endocrinology ,if any help is needed with Glucose control. Patient will be instructed based on the pre op clinic guidelines  about adjustment of diabetic treatment (If applicable )  considering the NPO status for Surgery     Gets feet care

## 2019-11-05 NOTE — ASSESSMENT & PLAN NOTE
Gout -  I suggest to keep the patient adequately hydrated.  Please note that surgical stress ,dehydration can precipitate acute gout

## 2019-11-05 NOTE — ASSESSMENT & PLAN NOTE
No itching , dark urine , light colored  stool   PLT - N  No suggestion of  hepatic decompensation

## 2019-11-05 NOTE — OUTPATIENT SUBJECTIVE & OBJECTIVE
Outpatient Subjective & Objective     Chief complaint-Preoperative evaluation, Perioperative Medical management, complication reduction plan     Active cardiac conditions- none    Revised cardiac risk index predictors- coronary artery disease, history of cerebrovascular disease and creatinine more than 2    Functional capacity -Examples of physical activity, stays active ,  can take 1 flight of stairs, works out ----- He can undertake all the above activities without  chest pain,chest tightness, Shortness of breath ,dizziness,lightheadedness making his exercise tolerance more, than 4 Mets.       Review of Systems   Constitutional: Negative for chills and fever.   HENT:        Sleep apnea   Suggested follow up    Eyes:        No unusual vision changes   Respiratory:        No cough , phlegm    No Hemoptysis   Cardiovascular:        As noted   Gastrointestinal:        Bowels- Regular   No overt GI/ blood losses   Endocrine:        Prednisone use > 20 mg daily for 3 weeks   Genitourinary: Negative for dysuria.        No urinary hesitancy    Musculoskeletal:        As above      Skin: Negative for rash.   Neurological: Negative for syncope.        No unilateral weakness   Hematological:        Current use of Anticoagulants  None    Psychiatric/Behavioral:          No SI/HI       No past medical history pertinent negatives.        No anesthesia, bleeding, cardiac problems , PONVwith previous surgeries/procedures.  Medications and Allergies reviewed in epic.   FH- No anesthesia,bleeding / venous thrombosis ,in family     Physical Exam    /78   Pulse 72   Temp 98.4   Sat 99 %      Physical Exam  Constitutional- General appearance-Conscious,Coherent  Eyes- No conjunctival icterus,pupils Rt sided eye prominence ,  round  and reactive to light   ENT-Oral cavity- moist  , Hearing grossly normal   Neck- No thyromegaly ,Trachea -central, No jugular venous distension,   No Carotid Bruit   Cardiovascular -Heart Sounds-  Normal  and  no murmur   , No gallop rhythm   Respiratory - Normal Respiratory Effort, Normal breath sounds,  Crepitationsrt base ,  no wheeze  and  no forced expiratory wheeze    Peripheral pitting pedal edema-- mild, no calf pain   Gastrointestinal -Soft abdomen, No palpable masses, Non Tender,Liver,Spleen not palpable. No-- free fluid and shifting dullness  Musculoskeletal- No finger Clubbing. Strength grossly normal   Lymphatic-No Palpable cervical, axillary,Inguinal lymphadenopathy   Psychiatric - normal effect,Orientation  Rt Dorsalis pedis pulses-palpable    Lt Dorsalis pedis pulses- palpable   Rt Posterior tibial pulses -palpable   Left posterior tibial pulses -palpable   Miscellaneous -  no asterixis,  no dupuytren's contracture,  Surgical scarLeft upper back - Left supra clavicular area  ,  no renal bruit and  tattoos  Investigations  Lab and Imaging have been reviewed in [facility].    Review of Medicine tests    EKG- I had independently reviewed the EKG from--11/22/2018   It was reported to be showing     Sinus tachycardia  Otherwise normal ECG  When compared with ECG of 22-MAY-2015 13:07,  Vent. rate has increased BY  48 BPM    11/23/2018    · Normal left ventricular systolic function. The estimated ejection fraction is 55%  · Grade I (mild) left ventricular diastolic dysfunction consistent with impaired relaxation.  · Normal right ventricular systolic function.  · Mild aortic regurgitation.  · The estimated PA systolic pressure is 22.18 mm Hg    Review of clinical lab tests:  Lab Results   Component Value Date    CREATININE 2.30 (H) 10/03/2019    HGB 11.8 (L) 10/03/2019     10/03/2019           Review of old records- Was done and information gathered regards to events leading to surgery and health conditions of significance in the perioperative period.    Outpatient Subjective & Objective

## 2019-11-05 NOTE — ASSESSMENT & PLAN NOTE
Nov 2018   Slurred speech   Had vision problem   No unilateral weakness   symptoms lasted 30-60 min   No problem since     Nov 2018 - No acute intracranial abnormality  MRA neck- Suspected 2 mm A-comm aneurysm.  Otherwise, no arterial focal high-grade stenosis or occlusion    Suggested to stay on ASA puma op

## 2019-11-05 NOTE — PROGRESS NOTES
[facility] - Pre Op Consult  Progress Note    Patient Name: Jerrod Mendez  MRN: [id number]  Date of Evaluation- 11/05/2019  PCP- Caroline Stapleton MD    Future cases for Jerrod Mendez [[id number]]     Case ID Status Date Time Tom Procedure Provider Location    [id number] [facility] 11/6/2019 10:30  DISSECTION, NECK Stu Romano MD [[id number]] [facility] OR 2ND FLR          HPI:  History of present illness- I had the pleasure of meeting this pleasant 64 y.o. gentleman in the pre op clinic prior to his elective Head and Neck surgery. The patient is new to me . Jerrod was accompanied by wife Marina.    I have obtained the history by speaking to the patient and by reviewing the electronic health records.    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   He only got two doses of high dose cisplatin due to kidney failure.   Completed radiation in Feb 2019.   Surveillance CT scan on 10/4/19 showed R supraclavicular lymphadenopathy     FNA was suspicious for squamous cell carcinoma.  He has no significant complaints today.  CT scans of the chest, abdomen and pelvis without evidence of metastatic disease  As per chart-Squamous cell carcinoma the right tonsil status post chemoradiation therapy completed Feb 2019  He has evidence of metastatic disease the right neck.     Has difficulty swallowing rice, ground meat on occasions   Has dry mouth and drinks lot of water     Relevant health conditions of significance for the perioperative period/ History of presenting illness -    Health conditions of significance for the perioperative period     DM    HTN    CKD    CAD    Lives with wife         Subjective/ Objective:       Chief complaint-Preoperative evaluation, Perioperative Medical management, complication reduction plan      Active cardiac conditions- none    Revised cardiac risk index predictors- coronary artery disease, history of cerebrovascular disease and creatinine more than 2    Functional capacity -Examples of physical activity, stays active ,  can take 1 flight of stairs, works out ----- He can undertake all the above activities without  chest pain,chest tightness, Shortness of breath ,dizziness,lightheadedness making his exercise tolerance more, than 4 Mets.       Review of Systems   Constitutional: Negative for chills and fever.   HENT:        Sleep apnea   Suggested follow up    Eyes:        No unusual vision changes   Respiratory:        No cough , phlegm    No Hemoptysis   Cardiovascular:        As noted   Gastrointestinal:        Bowels- Regular   No overt GI/ blood losses   Endocrine:        Prednisone use > 20 mg daily for 3 weeks   Genitourinary: Negative for dysuria.        No urinary hesitancy    Musculoskeletal:        As above      Skin: Negative for rash.   Neurological: Negative for syncope.        No unilateral weakness   Hematological:        Current use of Anticoagulants  None    Psychiatric/Behavioral:          No SI/HI       No past medical history pertinent negatives.        No anesthesia, bleeding, cardiac problems , PONVwith previous surgeries/procedures.  Medications and Allergies reviewed in epic.   FH- No anesthesia,bleeding / venous thrombosis ,in family     Physical Exam    /78   Pulse 72   Temp 98.4   Sat 99 %      Physical Exam  Constitutional- General appearance-Conscious,Coherent  Eyes- No conjunctival icterus,pupils Rt sided eye prominence ,  round  and reactive to light   ENT-Oral cavity- moist  , Hearing grossly normal   Neck- No thyromegaly ,Trachea -central, No jugular venous distension,   No Carotid Bruit   Cardiovascular -Heart Sounds- Normal  and  no murmur   , No gallop rhythm   Respiratory - Normal Respiratory Effort, Normal breath sounds,  Crepitationsrt base ,  no wheeze   and  no forced expiratory wheeze    Peripheral pitting pedal edema-- mild, no calf pain   Gastrointestinal -Soft abdomen, No palpable masses, Non Tender,Liver,Spleen not palpable. No-- free fluid and shifting dullness  Musculoskeletal- No finger Clubbing. Strength grossly normal   Lymphatic-No Palpable cervical, axillary,Inguinal lymphadenopathy   Psychiatric - normal effect,Orientation  Rt Dorsalis pedis pulses-palpable    Lt Dorsalis pedis pulses- palpable   Rt Posterior tibial pulses -palpable   Left posterior tibial pulses -palpable   Miscellaneous -  no asterixis,  no dupuytren's contracture,  Surgical scarLeft upper back - Left supra clavicular area  ,  no renal bruit and  tattoos  Investigations  Lab and Imaging have been reviewed in [facility].    Review of Medicine tests    EKG- I had independently reviewed the EKG from--11/22/2018   It was reported to be showing     Sinus tachycardia  Otherwise normal ECG  When compared with ECG of 22-MAY-2015 13:07,  Vent. rate has increased BY  48 BPM    11/23/2018    · Normal left ventricular systolic function. The estimated ejection fraction is 55%  · Grade I (mild) left ventricular diastolic dysfunction consistent with impaired relaxation.  · Normal right ventricular systolic function.  · Mild aortic regurgitation.  · The estimated PA systolic pressure is 22.18 mm Hg    Review of clinical lab tests:  Lab Results   Component Value Date    CREATININE 2.30 (H) 10/03/2019    HGB 11.8 (L) 10/03/2019     10/03/2019           Review of old records- Was done and information gathered regards to events leading to surgery and health conditions of significance in the perioperative period.        Preoperative cardiac risk assessment-  The patient does not have any active cardiac conditions . Revised cardiac risk index predictors-3 ---.Functional capacity is more than 4 Mets. He will be undergoing a Head and Neck procedure that carries a intermediate risk     Risk of a major  Cardiac event ( Defined as death, myocardial infarction, or cardiac arrest at 30 days after noncardiac surgery), based on RCRI score     15%     No further cardiac work up is indicated prior to proceeding with the surgery     Orders Placed This Encounter    Protime-INR       American Society of Anesthesiologists Physical status classification ( ASA ) class: 3     Postoperative pulmonary complication risk assessment:     ARISCAT ( Canet) risk index- risk class -  Low, if duration of surgery is under 3 hours, intermediate, if duration of surgery is over 3 hours      \    Assessment/Plan:     Tonsillar cancer        CAD (coronary artery disease)  May 2015 '  . Non obstructive coronaries noted with patent mid LAD stent. 20% proximal RCA and Proximal D1 Disease noted    CAD   Cardiac stenting 2007- had chest pressure leading to the stent   No History of CABG.  History of stenting  Chest pain leading to stent - Location- central     , Radiation-  Shoulder ? Side   , on exertion,  associated with shortness of breath, sweating, nausea,vomiting ,  diarrhea    Last Nitro use 1 year ago   Stays active , physical job, can take 1 flight of stairs- no exertoional symptoms     Beta blocker   Statin   ASA    Intracranial aneurysm  Nov 2018  No acute intracranial abnormality.  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  Otherwise, no arterial focal high-grade stenosis or occlusion.    As per their understanding it was felt to be dsmall and no inrtervention was required , but plans on following     Essential hypertension  Coreg  Home BP readings -120/70's  Recent BP readings in the record-130/70-80  Hypertension-  Blood pressure is acceptable . I suggest continuation of Coreg- during the entire perioperative period. I suggest blood pressure,monitoring.I suggest addressing pain control as uncontrolled pain can increased blood pressure     CKD (chronic kidney disease) stage 3, GFR 30-59 ml/min  Most recent creatinine stable since May 2019 about  2.3    Stages of CKD discussed  Deleterious effects NSAID's , Beneficial effects of Hydration discussed   Tylenol as needed for pain     I  suggest monitoring renal function, in put and out put status puma-operatively. I  suggest avoiding nephrotoxic medication including NSAIDs, COX2 inhibitors, intravenous contrast agent,avoiding hypotension to prevent further renal impairment.     Coronary angioplasty status  ASA - with history of  Stenting.  I have discussed the  risk of stent thrombosis with interruption of Aspirin regimen .Risk ( bleeding ) ,  benefits about perioperative use of  ASA  discussed  Suggested staying on ASA    Gout  Gout -  I suggest to keep the patient adequately hydrated.  Please note that surgical stress ,dehydration can precipitate acute gout         Sleep apnea  Not using CPAP since Cancer / Chemo   Still snores per wife   No apnea    Informed the risk of worsening sleep apnea in the perioperative period        I suggest  caution with usage of medication that can cause respiratory suppression in the perioperative period  potential ramifications of untreated sleep apnea, which could include daytime sleepiness, hypertension, heart disease and stroke were discussed    Avoidance of  supine sleep, weight gain , alcoholic beverages , care with , sedative , CNS depressant use indicated  since all of these can worsen JIM     Care with sedative use puma op suggested              Poor appetite  Radiation affected taste     Low back pain  Low back pain   Uses Hydrocodone infrequently     Oropharyngeal dysphagia  Milk helps the dysphagia   on regular consistency diet and on thin liquids      Dysphagia- I suggest providing appropriate diet as medication used in the perioperative period can possibly increase the dysphagia. I suggest aspiration precautions         Type 2 diabetes mellitus with diabetic neuropathy, without long-term current use of insulin  Type 2 Diabetes Mellitus  On treatment with oral  agents,not  Insulin    Hemoglobin A1c- pending   Capillary glucose check-1-2 times a week   Pre breakfast -100-120  Pre lunch -100's    Had high glucose in the past   DM better since chemo, weight loss, appetite loss    Neuropathy   Renal   Vascular       Diabetes Mellitus-I suggest monitoring the glucose in the perioperative period ( Before meals and bed time,if the patient is on oral feeds or every 6 hourly ,if the patient is NPO )  Blood glucose target in hospitalized patients is 140-180. Oral Hypoglycemic agents are generally avoided during the hospital stay . If glucose is consistently elevated ,I suggest using basal ,prandial Insulin regimen to control the glucose , as elevated glucose can be associated with adverse surgical out comes. Please consider involving Hospital Medicine or Endocrinology ,if any help is needed with Glucose control. Patient will be instructed based on the pre op clinic guidelines  about adjustment of diabetic treatment (If applicable )  considering the NPO status for Surgery     Gets feet care        Obesity with body mass index 30 or greater  Lost about 80 # since Chemo , 50 # back     No acid reflux, Fatty liver     Urolithiasis  No recent problem   Suggested hydration     History of hematuria  In the setting of stone    Hyperlipidemia  HLD-I  suggest continuation of statin during the entire perioperative period.    History of transient ischemic attack  Nov 2018   Slurred speech   Had vision problem   No unilateral weakness   symptoms lasted 30-60 min   No problem since     Nov 2018 - No acute intracranial abnormality  MRA neck- Suspected 2 mm A-comm aneurysm.  Otherwise, no arterial focal high-grade stenosis or occlusion    Suggested to stay on ASA puma op     Hypothyroid  TSH about 6 Aug 2019   Been on replacement   No overt hypo/ Hyper thyroid symptoms     Elevated bilirubin  No itching , dark urine , light colored  stool   PLT - N  No suggestion of  hepatic decompensation      Enlarged prostate  Increased risk of post operative urinary retention    Edema  Edema- I suggested avoidance of added salt,avoidance of NSAID's,( except ASA 81 mg a day )  unless advised or ordered  and suggested Limb elevation and keiko hose use        Preventive perioperative care    Thromboembolic prophylaxis:  His risk factors for thrombosis include cancer , surgical procedure and age.I suggest  thromboembolic prophylaxis ( mechanical/pharmacological, weighing the risk benefits of pharmacological agent use considering puma procedural bleeding )  during the perioperative period.I suggested being active in the post operative period.      Postoperative pulmonary complication prophylaxis-Risk factors for post operative pulmonary complications include sleep apnea  ASA class >2- I suggest incentive spirometry use, early ambulation and end tidal carbon dioxide monitoring , oral care , head end of bed elevation       Renal complication prophylaxis-Risk factors for renal complications include pre-existing renal disease, diabetes mellitus, hypertension and vascular disease . I suggest keeping him well hydrated and avoidance/ minimizing the use of  NSAID's,GEIGER 2 Inhibitors ,IV contrast if possible in the perioperative period.      Surgical site Infection Prophylaxis-I  suggest appropriate antibiotic for Prophylaxis against Surgical site infections       In view of enlarged prostate  the patient  is at risk of postoperative urinary retention.  I suggest avoidance / minimizing the of  Benzodiazepines,Anticholinergic medication,antihistamines ( Benadryl) , if possible in the perioperative period. I suggest using the minimum possible use of opioids for the minimum period of time in the perioperative period. Benadryl avoidance suggested      This visit was focused on Preoperative evaluation, Perioperative Medical management, complication reduction plans. I suggest that the patient follows up with primary care or relevant sub  specialists for ongoing health care.    I appreciate the opportunity to be involved in this patients care. Please feel free to contact me if there were any questions about this consultation.    Patient is optimized     Patient  was instructed to call and update me about any changes to health,  medication, office visits ,testing out side of the puma operative care center , hospitalizations between now and surgery     Valerie Gilbert MD  Perioperative Medicine  [facility]   Pager [phone]  -  11/5- 17 29     Corresponded with surgeon about keeping him on  ASA 81 mg daily dose    INR - N  A1c  5.8     Called to follow up , spoke to him, to address any concerns with the up coming surgery or any questions on Medication instructions -  Doing well ,No changes to Medication, Health -  Discussed staying on  ASA 81 mg daily dose

## 2019-11-05 NOTE — ASSESSMENT & PLAN NOTE
Edema- I suggested avoidance of added salt,avoidance of NSAID's,( except ASA 81 mg a day )  unless advised or ordered  and suggested Limb elevation and keiko hose use

## 2019-11-05 NOTE — ASSESSMENT & PLAN NOTE
Milk helps the dysphagia   on regular consistency diet and on thin liquids      Dysphagia- I suggest providing appropriate diet as medication used in the perioperative period can possibly increase the dysphagia. I suggest aspiration precautions

## 2019-11-05 NOTE — ASSESSMENT & PLAN NOTE
ASA - with history of  Stenting.  I have discussed the  risk of stent thrombosis with interruption of Aspirin regimen .Risk ( bleeding ) ,  benefits about perioperative use of  ASA  discussed  Suggested staying on ASA

## 2019-11-05 NOTE — ASSESSMENT & PLAN NOTE
Not using CPAP since Cancer / Chemo   Still snores per wife   No apnea    Informed the risk of worsening sleep apnea in the perioperative period        I suggest  caution with usage of medication that can cause respiratory suppression in the perioperative period  potential ramifications of untreated sleep apnea, which could include daytime sleepiness, hypertension, heart disease and stroke were discussed    Avoidance of  supine sleep, weight gain , alcoholic beverages , care with , sedative , CNS depressant use indicated  since all of these can worsen JIM     Care with sedative use puma op suggested

## 2019-11-06 ENCOUNTER — ANESTHESIA (OUTPATIENT)
Dept: SURGERY | Facility: HOSPITAL | Age: 64
DRG: 821 | End: 2019-11-06
Payer: COMMERCIAL

## 2019-11-06 ENCOUNTER — HOSPITAL ENCOUNTER (INPATIENT)
Facility: HOSPITAL | Age: 64
LOS: 2 days | Discharge: HOME | DRG: 821 | End: 2019-11-08
Attending: OTOLARYNGOLOGY | Admitting: OTOLARYNGOLOGY
Payer: COMMERCIAL

## 2019-11-06 DIAGNOSIS — C77.0 SECONDARY MALIGNANT NEOPLASM OF CERVICAL LYMPH NODE: Primary | ICD-10-CM

## 2019-11-06 LAB
POCT GLUCOSE: 120 MG/DL (ref 70–110)
POCT GLUCOSE: 147 MG/DL (ref 70–110)
POCT GLUCOSE: 155 MG/DL (ref 70–110)
POCT GLUCOSE: 174 MG/DL (ref 70–110)

## 2019-11-06 PROCEDURE — 63600175 PHARM REV CODE 636 W HCPCS: Performed by: NURSE ANESTHETIST, CERTIFIED REGISTERED

## 2019-11-06 PROCEDURE — 88342 IMHCHEM/IMCYTCHM 1ST ANTB: CPT | Mod: 26,,, | Performed by: PATHOLOGY

## 2019-11-06 PROCEDURE — D9220A PRA ANESTHESIA: ICD-10-PCS | Mod: ANES,,, | Performed by: ANESTHESIOLOGY

## 2019-11-06 PROCEDURE — 88342 TISSUE SPECIMEN TO PATHOLOGY - SURGERY: ICD-10-PCS | Mod: 26,,, | Performed by: PATHOLOGY

## 2019-11-06 PROCEDURE — 37000009 HC ANESTHESIA EA ADD 15 MINS: Performed by: OTOLARYNGOLOGY

## 2019-11-06 PROCEDURE — 37000008 HC ANESTHESIA 1ST 15 MINUTES: Performed by: OTOLARYNGOLOGY

## 2019-11-06 PROCEDURE — 27201423 OPTIME MED/SURG SUP & DEVICES STERILE SUPPLY: Performed by: OTOLARYNGOLOGY

## 2019-11-06 PROCEDURE — 63600175 PHARM REV CODE 636 W HCPCS: Performed by: OTOLARYNGOLOGY

## 2019-11-06 PROCEDURE — 88331 PATH CONSLTJ SURG 1 BLK 1SPC: CPT | Mod: 26,,, | Performed by: PATHOLOGY

## 2019-11-06 PROCEDURE — 88331 TISSUE SPECIMEN TO PATHOLOGY - SURGERY: ICD-10-PCS | Mod: 26,,, | Performed by: PATHOLOGY

## 2019-11-06 PROCEDURE — 25000003 PHARM REV CODE 250: Performed by: NURSE ANESTHETIST, CERTIFIED REGISTERED

## 2019-11-06 PROCEDURE — 38724 PR REMOVAL NODES, NECK,CERV MOD RAD: ICD-10-PCS | Mod: RT,,, | Performed by: OTOLARYNGOLOGY

## 2019-11-06 PROCEDURE — 25000003 PHARM REV CODE 250: Performed by: OTOLARYNGOLOGY

## 2019-11-06 PROCEDURE — D9220A PRA ANESTHESIA: Mod: ANES,,, | Performed by: ANESTHESIOLOGY

## 2019-11-06 PROCEDURE — D9220A PRA ANESTHESIA: ICD-10-PCS | Mod: CRNA,,, | Performed by: NURSE ANESTHETIST, CERTIFIED REGISTERED

## 2019-11-06 PROCEDURE — 71000033 HC RECOVERY, INTIAL HOUR: Performed by: OTOLARYNGOLOGY

## 2019-11-06 PROCEDURE — 88305 TISSUE EXAM BY PATHOLOGIST: CPT | Performed by: PATHOLOGY

## 2019-11-06 PROCEDURE — 36000707: Performed by: OTOLARYNGOLOGY

## 2019-11-06 PROCEDURE — 36000706: Performed by: OTOLARYNGOLOGY

## 2019-11-06 PROCEDURE — 88305 TISSUE SPECIMEN TO PATHOLOGY - SURGERY: ICD-10-PCS | Mod: 26,,, | Performed by: PATHOLOGY

## 2019-11-06 PROCEDURE — 38724 REMOVAL OF LYMPH NODES NECK: CPT | Mod: RT,,, | Performed by: OTOLARYNGOLOGY

## 2019-11-06 PROCEDURE — C1729 CATH, DRAINAGE: HCPCS | Performed by: OTOLARYNGOLOGY

## 2019-11-06 PROCEDURE — 63600175 PHARM REV CODE 636 W HCPCS: Performed by: ANESTHESIOLOGY

## 2019-11-06 PROCEDURE — 82962 GLUCOSE BLOOD TEST: CPT | Performed by: OTOLARYNGOLOGY

## 2019-11-06 PROCEDURE — D9220A PRA ANESTHESIA: Mod: CRNA,,, | Performed by: NURSE ANESTHETIST, CERTIFIED REGISTERED

## 2019-11-06 PROCEDURE — 11000001 HC ACUTE MED/SURG PRIVATE ROOM

## 2019-11-06 RX ORDER — NEOSTIGMINE METHYLSULFATE 0.5 MG/ML
INJECTION, SOLUTION INTRAVENOUS
Status: DISCONTINUED | OUTPATIENT
Start: 2019-11-06 | End: 2019-11-06

## 2019-11-06 RX ORDER — SODIUM CHLORIDE 0.9 % (FLUSH) 0.9 %
10 SYRINGE (ML) INJECTION
Status: DISCONTINUED | OUTPATIENT
Start: 2019-11-06 | End: 2019-11-06

## 2019-11-06 RX ORDER — GLYCOPYRROLATE 0.2 MG/ML
INJECTION INTRAMUSCULAR; INTRAVENOUS
Status: DISCONTINUED | OUTPATIENT
Start: 2019-11-06 | End: 2019-11-06

## 2019-11-06 RX ORDER — ACETAMINOPHEN 10 MG/ML
INJECTION, SOLUTION INTRAVENOUS
Status: DISCONTINUED | OUTPATIENT
Start: 2019-11-06 | End: 2019-11-06

## 2019-11-06 RX ORDER — INSULIN ASPART 100 [IU]/ML
0-5 INJECTION, SOLUTION INTRAVENOUS; SUBCUTANEOUS
Status: DISCONTINUED | OUTPATIENT
Start: 2019-11-06 | End: 2019-11-08 | Stop reason: HOSPADM

## 2019-11-06 RX ORDER — SUCCINYLCHOLINE CHLORIDE 20 MG/ML
INJECTION INTRAMUSCULAR; INTRAVENOUS
Status: DISCONTINUED | OUTPATIENT
Start: 2019-11-06 | End: 2019-11-06

## 2019-11-06 RX ORDER — SODIUM CHLORIDE 9 MG/ML
INJECTION, SOLUTION INTRAVENOUS CONTINUOUS PRN
Status: DISCONTINUED | OUTPATIENT
Start: 2019-11-06 | End: 2019-11-06

## 2019-11-06 RX ORDER — IBUPROFEN 200 MG
24 TABLET ORAL
Status: DISCONTINUED | OUTPATIENT
Start: 2019-11-06 | End: 2019-11-08 | Stop reason: HOSPADM

## 2019-11-06 RX ORDER — SODIUM CHLORIDE 0.9 % (FLUSH) 0.9 %
3 SYRINGE (ML) INJECTION
Status: DISCONTINUED | OUTPATIENT
Start: 2019-11-06 | End: 2019-11-06

## 2019-11-06 RX ORDER — ROCURONIUM BROMIDE 10 MG/ML
INJECTION, SOLUTION INTRAVENOUS
Status: DISCONTINUED | OUTPATIENT
Start: 2019-11-06 | End: 2019-11-06

## 2019-11-06 RX ORDER — IBUPROFEN 200 MG
16 TABLET ORAL
Status: DISCONTINUED | OUTPATIENT
Start: 2019-11-06 | End: 2019-11-08 | Stop reason: HOSPADM

## 2019-11-06 RX ORDER — SODIUM CHLORIDE 0.9 % (FLUSH) 0.9 %
10 SYRINGE (ML) INJECTION
Status: DISCONTINUED | OUTPATIENT
Start: 2019-11-06 | End: 2019-11-06 | Stop reason: HOSPADM

## 2019-11-06 RX ORDER — CARVEDILOL 3.12 MG/1
3.12 TABLET ORAL 2 TIMES DAILY
Status: DISCONTINUED | OUTPATIENT
Start: 2019-11-06 | End: 2019-11-08 | Stop reason: HOSPADM

## 2019-11-06 RX ORDER — DEXAMETHASONE SODIUM PHOSPHATE 4 MG/ML
INJECTION, SOLUTION INTRA-ARTICULAR; INTRALESIONAL; INTRAMUSCULAR; INTRAVENOUS; SOFT TISSUE
Status: DISCONTINUED | OUTPATIENT
Start: 2019-11-06 | End: 2019-11-06

## 2019-11-06 RX ORDER — PHENYLEPHRINE HYDROCHLORIDE 10 MG/ML
INJECTION INTRAVENOUS
Status: DISCONTINUED | OUTPATIENT
Start: 2019-11-06 | End: 2019-11-06

## 2019-11-06 RX ORDER — MIDAZOLAM HYDROCHLORIDE 1 MG/ML
INJECTION, SOLUTION INTRAMUSCULAR; INTRAVENOUS
Status: DISCONTINUED | OUTPATIENT
Start: 2019-11-06 | End: 2019-11-06

## 2019-11-06 RX ORDER — LIDOCAINE HYDROCHLORIDE 10 MG/ML
1 INJECTION, SOLUTION EPIDURAL; INFILTRATION; INTRACAUDAL; PERINEURAL ONCE
Status: COMPLETED | OUTPATIENT
Start: 2019-11-06 | End: 2019-11-06

## 2019-11-06 RX ORDER — ONDANSETRON 2 MG/ML
4 INJECTION INTRAMUSCULAR; INTRAVENOUS EVERY 12 HOURS PRN
Status: DISCONTINUED | OUTPATIENT
Start: 2019-11-06 | End: 2019-11-08 | Stop reason: HOSPADM

## 2019-11-06 RX ORDER — HYDROCODONE BITARTRATE AND ACETAMINOPHEN 5; 325 MG/1; MG/1
1 TABLET ORAL EVERY 4 HOURS PRN
Status: DISCONTINUED | OUTPATIENT
Start: 2019-11-06 | End: 2019-11-08 | Stop reason: HOSPADM

## 2019-11-06 RX ORDER — PROPOFOL 10 MG/ML
VIAL (ML) INTRAVENOUS
Status: DISCONTINUED | OUTPATIENT
Start: 2019-11-06 | End: 2019-11-06

## 2019-11-06 RX ORDER — MORPHINE SULFATE 2 MG/ML
2 INJECTION, SOLUTION INTRAMUSCULAR; INTRAVENOUS EVERY 4 HOURS PRN
Status: DISCONTINUED | OUTPATIENT
Start: 2019-11-06 | End: 2019-11-08 | Stop reason: HOSPADM

## 2019-11-06 RX ORDER — LEVOTHYROXINE SODIUM 50 UG/1
50 TABLET ORAL
Status: DISCONTINUED | OUTPATIENT
Start: 2019-11-07 | End: 2019-11-08 | Stop reason: HOSPADM

## 2019-11-06 RX ORDER — GLUCAGON 1 MG
1 KIT INJECTION
Status: DISCONTINUED | OUTPATIENT
Start: 2019-11-06 | End: 2019-11-08 | Stop reason: HOSPADM

## 2019-11-06 RX ORDER — HYDROMORPHONE HYDROCHLORIDE 1 MG/ML
0.2 INJECTION, SOLUTION INTRAMUSCULAR; INTRAVENOUS; SUBCUTANEOUS EVERY 5 MIN PRN
Status: DISCONTINUED | OUTPATIENT
Start: 2019-11-06 | End: 2019-11-06 | Stop reason: HOSPADM

## 2019-11-06 RX ORDER — CEFAZOLIN SODIUM 1 G/3ML
2 INJECTION, POWDER, FOR SOLUTION INTRAMUSCULAR; INTRAVENOUS
Status: COMPLETED | OUTPATIENT
Start: 2019-11-06 | End: 2019-11-06

## 2019-11-06 RX ORDER — ENOXAPARIN SODIUM 100 MG/ML
40 INJECTION SUBCUTANEOUS EVERY 24 HOURS
Status: DISCONTINUED | OUTPATIENT
Start: 2019-11-06 | End: 2019-11-08 | Stop reason: HOSPADM

## 2019-11-06 RX ORDER — FENTANYL CITRATE 50 UG/ML
INJECTION, SOLUTION INTRAMUSCULAR; INTRAVENOUS
Status: DISCONTINUED | OUTPATIENT
Start: 2019-11-06 | End: 2019-11-06

## 2019-11-06 RX ORDER — LIDOCAINE HYDROCHLORIDE AND EPINEPHRINE 10; 10 MG/ML; UG/ML
INJECTION, SOLUTION INFILTRATION; PERINEURAL
Status: DISCONTINUED | OUTPATIENT
Start: 2019-11-06 | End: 2019-11-06 | Stop reason: HOSPADM

## 2019-11-06 RX ORDER — NITROGLYCERIN 0.3 MG/1
0.3 TABLET SUBLINGUAL EVERY 5 MIN PRN
Status: DISCONTINUED | OUTPATIENT
Start: 2019-11-06 | End: 2019-11-08 | Stop reason: HOSPADM

## 2019-11-06 RX ORDER — LIDOCAINE HCL/PF 100 MG/5ML
SYRINGE (ML) INTRAVENOUS
Status: DISCONTINUED | OUTPATIENT
Start: 2019-11-06 | End: 2019-11-06

## 2019-11-06 RX ORDER — ONDANSETRON 2 MG/ML
INJECTION INTRAMUSCULAR; INTRAVENOUS
Status: DISCONTINUED | OUTPATIENT
Start: 2019-11-06 | End: 2019-11-06

## 2019-11-06 RX ORDER — SIMVASTATIN 20 MG/1
40 TABLET, FILM COATED ORAL NIGHTLY
Status: DISCONTINUED | OUTPATIENT
Start: 2019-11-06 | End: 2019-11-08 | Stop reason: HOSPADM

## 2019-11-06 RX ADMIN — LIDOCAINE HYDROCHLORIDE 1 MG: 10 INJECTION, SOLUTION EPIDURAL; INFILTRATION; INTRACAUDAL; PERINEURAL at 08:11

## 2019-11-06 RX ADMIN — FENTANYL CITRATE 100 MCG: 50 INJECTION, SOLUTION INTRAMUSCULAR; INTRAVENOUS at 10:11

## 2019-11-06 RX ADMIN — SODIUM CHLORIDE: 0.9 INJECTION, SOLUTION INTRAVENOUS at 09:11

## 2019-11-06 RX ADMIN — ACETAMINOPHEN 1000 MG: 10 INJECTION, SOLUTION INTRAVENOUS at 12:11

## 2019-11-06 RX ADMIN — ROCURONIUM BROMIDE 10 MG: 10 INJECTION, SOLUTION INTRAVENOUS at 12:11

## 2019-11-06 RX ADMIN — ROCURONIUM BROMIDE 10 MG: 10 INJECTION, SOLUTION INTRAVENOUS at 10:11

## 2019-11-06 RX ADMIN — HYDROMORPHONE HYDROCHLORIDE 0.2 MG: 1 INJECTION, SOLUTION INTRAMUSCULAR; INTRAVENOUS; SUBCUTANEOUS at 02:11

## 2019-11-06 RX ADMIN — PHENYLEPHRINE HYDROCHLORIDE 200 MCG: 10 INJECTION INTRAVENOUS at 12:11

## 2019-11-06 RX ADMIN — ENOXAPARIN SODIUM 40 MG: 100 INJECTION SUBCUTANEOUS at 05:11

## 2019-11-06 RX ADMIN — ROCURONIUM BROMIDE 40 MG: 10 INJECTION, SOLUTION INTRAVENOUS at 10:11

## 2019-11-06 RX ADMIN — CARVEDILOL 3.12 MG: 3.12 TABLET, FILM COATED ORAL at 09:11

## 2019-11-06 RX ADMIN — LIDOCAINE HYDROCHLORIDE 100 MG: 20 INJECTION, SOLUTION INTRAVENOUS at 10:11

## 2019-11-06 RX ADMIN — HYDROCODONE BITARTRATE AND ACETAMINOPHEN 1 TABLET: 5; 325 TABLET ORAL at 02:11

## 2019-11-06 RX ADMIN — PHENYLEPHRINE HYDROCHLORIDE 100 MCG: 10 INJECTION INTRAVENOUS at 10:11

## 2019-11-06 RX ADMIN — ONDANSETRON 4 MG: 2 INJECTION INTRAMUSCULAR; INTRAVENOUS at 04:11

## 2019-11-06 RX ADMIN — MIDAZOLAM HYDROCHLORIDE 2 MG: 1 INJECTION, SOLUTION INTRAMUSCULAR; INTRAVENOUS at 09:11

## 2019-11-06 RX ADMIN — PROPOFOL 150 MG: 10 INJECTION, EMULSION INTRAVENOUS at 10:11

## 2019-11-06 RX ADMIN — PROPOFOL 50 MG: 10 INJECTION, EMULSION INTRAVENOUS at 10:11

## 2019-11-06 RX ADMIN — DEXAMETHASONE SODIUM PHOSPHATE 4 MG: 4 INJECTION, SOLUTION INTRAMUSCULAR; INTRAVENOUS at 10:11

## 2019-11-06 RX ADMIN — SODIUM CHLORIDE, SODIUM GLUCONATE, SODIUM ACETATE, POTASSIUM CHLORIDE, MAGNESIUM CHLORIDE, SODIUM PHOSPHATE, DIBASIC, AND POTASSIUM PHOSPHATE: .53; .5; .37; .037; .03; .012; .00082 INJECTION, SOLUTION INTRAVENOUS at 11:11

## 2019-11-06 RX ADMIN — SUCCINYLCHOLINE CHLORIDE 120 MG: 20 INJECTION, SOLUTION INTRAMUSCULAR; INTRAVENOUS at 10:11

## 2019-11-06 RX ADMIN — PHENYLEPHRINE HYDROCHLORIDE 200 MCG: 10 INJECTION INTRAVENOUS at 10:11

## 2019-11-06 RX ADMIN — GLYCOPYRROLATE 0.6 MG: 0.2 INJECTION, SOLUTION INTRAMUSCULAR; INTRAVENOUS at 01:11

## 2019-11-06 RX ADMIN — SODIUM CHLORIDE, SODIUM GLUCONATE, SODIUM ACETATE, POTASSIUM CHLORIDE, MAGNESIUM CHLORIDE, SODIUM PHOSPHATE, DIBASIC, AND POTASSIUM PHOSPHATE: .53; .5; .37; .037; .03; .012; .00082 INJECTION, SOLUTION INTRAVENOUS at 12:11

## 2019-11-06 RX ADMIN — SIMVASTATIN 40 MG: 20 TABLET, FILM COATED ORAL at 09:11

## 2019-11-06 RX ADMIN — PHENYLEPHRINE HYDROCHLORIDE 100 MCG: 10 INJECTION INTRAVENOUS at 12:11

## 2019-11-06 RX ADMIN — MORPHINE SULFATE 2 MG: 2 INJECTION, SOLUTION INTRAMUSCULAR; INTRAVENOUS at 07:11

## 2019-11-06 RX ADMIN — CEFAZOLIN 2 G: 330 INJECTION, POWDER, FOR SOLUTION INTRAMUSCULAR; INTRAVENOUS at 10:11

## 2019-11-06 RX ADMIN — NEOSTIGMINE METHYLSULFATE 5 MG: 0.5 INJECTION INTRAVENOUS at 01:11

## 2019-11-06 RX ADMIN — ONDANSETRON 4 MG: 2 INJECTION INTRAMUSCULAR; INTRAVENOUS at 01:11

## 2019-11-06 NOTE — OP NOTE
DATE OF PROCEDURE: 11/6/2019     PREOPERATIVE DIAGNOSES:   Squamous cell carcinoma the right tonsil status post chemoradiation therapy with metastatic disease to the right neck    POSTOPERATIVE DIAGNOSES:   Same    SURGEON:  Surgeon(s) and Role:     * Stu Romano MD - Primary     * Romario Dsouza MD - Resident - Assisting      PROCEDURES PERFORMED:   Modified neck dissection involving levels 2 through 5 with sacrifice of the spinal accessory nerve and ligation of the right internal jugular vein    ANESTHESIA: General      INDICATIONS FOR PROCEDURE:   Jerrod Mendez is a 64 y.o. man status post chemoradiation therapy for squamous cell carcinoma of the right tonsil metastatic to the right neck.  He completed this therapy in February of this year.  He recently presented to me with evidence of disease in right level 5.  CT scans of the neck along with a noncontrast MRI revealed this disease to be resectable.  There was no nohemy evidence of disease elsewhere on his CT scan of the neck or MRI or on a PET-CT obtained in May of this year.  As such, I recommended that we proceed to the operating room for the aforementioned procedures.    He was apprised of the risks, benefits and alternatives to surgery.  In spite of the risk inherent to surgery,he provided informed consent for the aforementioned procedures.     PROCEDURE IN DETAIL:  The patient was taken to the operating room and placed on the operating table in the supine position.  General endotracheal anesthesia was induced by the anesthesia team.     Right-sided apron incision was marked and injected with several cc of 1% lidocaine with epinephrine.  The neck was prepped and draped in standard sterile fashion.    To begin, the skin was incised utilizing the Bovie on cutting current.  Sharp dissection proceeded through the underlying subcutaneous tissue and platysma muscle.  Superiorly and inferiorly-based subplatysmal flaps were elevated from the level  of the mandible and mastoid superiorly to the level of the clavicle inferiorly and over the trapezius posteriorly.  The spinal accessory nerve was identified in the posterior triangle and followed antegrade to its junction with the trapezius muscle.  At the junction with the trapezius, the nerve was noted to be encased by tumor and was sacrificed.  The trapezius was skeletonized from superior to inferior down to the level of the clavicle.  The fibrofatty tissue of level 5A was transected down to the underlying omohyoid and deep cervical fascia.  The specimen was elevated off of the deep cervical fascia and brought from posterior to anterior identifying the levator and splenius muscles.  The posterior aspect of the sternocleidomastoid was then skeletonized from superior to inferior.  Subsequently, the anterior border was skeletonized from the mastoid to the clavicle.  The omohyoid was identified, circumferentially dissected and sacrificed in the anterior triangle.    Dissection proceeded superiorly to identify the digastric muscle.  The posterior belly was skeletonized from the tendon to the level of the mastoid.  The spinal accessory nerve was identified in the anterior triangle in sacrificed.  The internal jugular vein was noted to be thrombosed superiorly.  The lateral border was bluntly dissected and identified.  Level 2b was transected down to the underlying deep cervical fascia and brought from posterior to anterior.  As we dissected the internal jugular vein at the skull base, it became apparent that in order to clear the tumor we would have to ligate and resect the vein.  The vein was circumferentially dissected superiorly, clamped with a right angle and ligated with a large vascular clip and a 3-0 silk stick tie.  The inferior aspect of the vein was cut across and was noted to be thrombosed.  The medial aspect of the internal jugular vein was then  from the surrounding structures of the carotid  sheath.  Superiorly, at the level of the skull base there was noted to be tissue in this region worrisome for viable tumor.  This was sampled and sent to pathology and confirmed to be consistent with squamous cell carcinoma.  Given the location of this tumor, affected breech the deep cervical fascia and the need to resect the carotid artery and vagus nerve in order to clear it, we had cleared his disease unresectable.  The nodes of level 5 were amputated and sent to pathology for permanent analysis.  Hemostasis was achieved with electrocautery.  Febrile are was packed the skull base in order to bolster are ligation of the internal jugular vein and the wound was closed over 2 15 Croatian Kartik drains.  Each was secured with a silk suture.  The neck was closed with 3-0 Vicryl, 4-0 Monocryl and Dermabond.    Once the wounds were closed, the patient was handed back to anesthesia.  He was awakened, extubated and transferred to recovery in satisfactory condition.    There were no intraoperative complications.  I was present for and participated in the entire procedure as dictated above.       ESTIMATED BLOOD LOSS: 25 mL    SPECIMENS:   Specimen (12h ago, onward)     Start     Ordered    11/06/19 1222  Specimen to Pathology - Surgery  Once     Comments:  Pre-op Diagnosis: Secondary malignant neoplasm of cervical lymph node [C77.0]Procedure(s):DISSECTION, NECK Number of specimens: 2Name of specimens: 1. Tissue at right skull base - FROZEN2. Right neck dissection, level 5 - PERMANENT      11/06/19 1237

## 2019-11-06 NOTE — INTERVAL H&P NOTE
The patient has been examined and the H&P has been reviewed:    I concur with the findings and no changes have occurred since H&P was written.    Anesthesia/Surgery risks, benefits and alternative options discussed and understood by patient/family.          Active Hospital Problems    Diagnosis  POA    Secondary malignant neoplasm of cervical lymph node [C77.0]  Yes      Resolved Hospital Problems   No resolved problems to display.

## 2019-11-06 NOTE — BRIEF OP NOTE
[facility]  Brief Operative Note    SUMMARY     Surgery Date: 11/6/2019     Surgeon(s) and Role:     * Stu Romano MD - Primary     * Romario Dsouza MD - Resident - Assisting        Pre-op Diagnosis:  Secondary malignant neoplasm of cervical lymph node [C77.0]    Post-op Diagnosis:  Post-Op Diagnosis Codes:     * Secondary malignant neoplasm of cervical lymph node [C77.0]    Procedure(s) (LRB):  DISSECTION, NECK (Right)    Anesthesia: General    Description of Procedure: R SND (II-V) aborted d/t invovlement of carotid artery positive for SCC on frozen; completed R level V with sacrifice of CN XI    Description of the findings of the procedure: see op note    Estimated Blood Loss: * No values recorded between 11/6/2019 10:23 AM and 11/6/2019  1:24 PM *         Specimens:   Specimen (12h ago, onward)     Start     Ordered    11/06/19 1222  Specimen to Pathology - Surgery  Once     Comments:  Pre-op Diagnosis: Secondary malignant neoplasm of cervical lymph node [C77.0]Procedure(s):DISSECTION, NECK Number of specimens: 2Name of specimens: 1. Tissue at right skull base - FROZEN2. Right neck dissection, level 5 - PERMANENT      11/06/19 1237

## 2019-11-06 NOTE — TRANSFER OF CARE
"Anesthesia Transfer of Care Note    Patient: Jerrod Mendez    Procedure(s) Performed: Procedure(s) (LRB):  DISSECTION, NECK (Right)    Patient location: PACU    Anesthesia Type: general    Transport from OR: Transported from OR on 6-10 L/min O2 by face mask with adequate spontaneous ventilation    Post pain: adequate analgesia    Post assessment: no apparent anesthetic complications and tolerated procedure well    Post vital signs: stable    Level of consciousness: awake    Nausea/Vomiting: no nausea/vomiting    Complications: none    Transfer of care protocol was followed      Last vitals:   Visit Vitals  /68 (BP Location: Right arm, Patient Position: Lying)   Pulse 61   Temp 36.7 °C (98.1 °F) (Axillary)   Resp 18   Ht 5' 11" (1.803 m)   Wt 114.3 kg (252 lb)   SpO2 100%   BMI 35.15 kg/m²     "

## 2019-11-06 NOTE — PLAN OF CARE
Pre op nursing care complete. Wife at bedside. H&P update, consents and site marking pending.  visit in [facility].

## 2019-11-06 NOTE — NURSING TRANSFER
Nursing Transfer Note      11/6/2019     Transfer To: [id number]    Transfer via stretcher    Transfer with DEMETRIO x2, SL x2    Transported by pct    Medicines sent: no    Chart send with patient: Yes    Notified: spouse    Patient reassessed at: 11/6/19@1519hrs

## 2019-11-07 LAB
POCT GLUCOSE: 117 MG/DL (ref 70–110)
POCT GLUCOSE: 144 MG/DL (ref 70–110)
POCT GLUCOSE: 150 MG/DL (ref 70–110)
POCT GLUCOSE: 177 MG/DL (ref 70–110)

## 2019-11-07 PROCEDURE — 63600175 PHARM REV CODE 636 W HCPCS: Performed by: OTOLARYNGOLOGY

## 2019-11-07 PROCEDURE — 25000003 PHARM REV CODE 250: Performed by: OTOLARYNGOLOGY

## 2019-11-07 PROCEDURE — 94761 N-INVAS EAR/PLS OXIMETRY MLT: CPT

## 2019-11-07 PROCEDURE — 11000001 HC ACUTE MED/SURG PRIVATE ROOM

## 2019-11-07 RX ADMIN — HYDROCODONE BITARTRATE AND ACETAMINOPHEN 1 TABLET: 5; 325 TABLET ORAL at 03:11

## 2019-11-07 RX ADMIN — MORPHINE SULFATE 2 MG: 2 INJECTION, SOLUTION INTRAMUSCULAR; INTRAVENOUS at 01:11

## 2019-11-07 RX ADMIN — CARVEDILOL 3.12 MG: 3.12 TABLET, FILM COATED ORAL at 08:11

## 2019-11-07 RX ADMIN — MORPHINE SULFATE 2 MG: 2 INJECTION, SOLUTION INTRAMUSCULAR; INTRAVENOUS at 09:11

## 2019-11-07 RX ADMIN — LEVOTHYROXINE SODIUM 50 MCG: 50 TABLET ORAL at 06:11

## 2019-11-07 RX ADMIN — SIMVASTATIN 40 MG: 20 TABLET, FILM COATED ORAL at 08:11

## 2019-11-07 RX ADMIN — ENOXAPARIN SODIUM 40 MG: 100 INJECTION SUBCUTANEOUS at 05:11

## 2019-11-07 RX ADMIN — CARVEDILOL 3.12 MG: 3.12 TABLET, FILM COATED ORAL at 09:11

## 2019-11-07 NOTE — ASSESSMENT & PLAN NOTE
S/p aborted SND with sacrifice of IJV and CN XI POD#1    - continue drains LCWS  - morphine, norco PRN  - ADAT  - d/c this afternoon pending drain status

## 2019-11-07 NOTE — SUBJECTIVE & OBJECTIVE
Interval History: NAEON. Drain not holding suction    Medications:  Continuous Infusions:  Scheduled Meds:   carvedilol  3.125 mg Oral BID    enoxaparin  40 mg Subcutaneous Daily    levothyroxine  50 mcg Oral Before breakfast    simvastatin  40 mg Oral QHS     PRN Meds:Dextrose 10% Bolus, Dextrose 10% Bolus, glucagon (human recombinant), glucose, glucose, HYDROcodone-acetaminophen, insulin aspart U-100, morphine, nitroGLYCERIN, ondansetron, promethazine (PHENERGAN) IVPB     Review of patient's allergies indicates:  No Known Allergies  Objective:     Vital Signs (24h Range):  Temp:  [97 °F (36.1 °C)-98.5 °F (36.9 °C)] 98.5 °F (36.9 °C)  Pulse:  [61-90] 86  Resp:  [12-19] 18  SpO2:  [95 %-100 %] 97 %  BP: (126-146)/(65-81) 146/70       Lines/Drains/Airways     Central Venous Catheter Line                 Port A Cath Single Lumen 12/14/18 0800 327 days          Drain                 Closed/Suction Drain 11/06/19 1247 Medial Neck Bulb 15 Fr. less than 1 day         Closed/Suction Drain 11/06/19 1248 Right;Lateral Neck Bulb 15 Fr. less than 1 day          Peripheral Intravenous Line                 Peripheral IV - Single Lumen 11/06/19 0840 18 G Left Forearm less than 1 day         Peripheral IV - Single Lumen 11/06/19 1007 18 G Left Hand less than 1 day                Physical Exam  NAD  Awake and alert  Head AT/NC  Auricles WNL AU  Nose w/ normal external appearance  MMM, anterior tongue mobile, FOM soft  Neck soft, not TTP, normal ROM, incision c/d/i w dermabond in place. Anterior DEMETRIO holding suction, posterior DEMETRIO not holding suction (on continuous wall suction)  Normal WOB, no stridor or stertor    Significant Labs:  CBC: No results for input(s): WBC, RBC, HGB, HCT, PLT, MCV, MCH, MCHC in the last 168 hours.  CMP: No results for input(s): GLU, CALCIUM, ALBUMIN, PROT, NA, K, CO2, CL, BUN, CREATININE, ALKPHOS, ALT, AST, BILITOT in the last 168 hours.    Significant Diagnostics:  None

## 2019-11-07 NOTE — NURSING
Pt admitted to room. Transferred to bed from stretcher. Tolerated well. Will continue to monitor. Call bell intact and in reach.

## 2019-11-07 NOTE — ANESTHESIA POSTPROCEDURE EVALUATION
Anesthesia Post Evaluation    Patient: Jerrod Mendez    Procedure(s) Performed: Procedure(s) (LRB):  DISSECTION, NECK (Right)    Final Anesthesia Type: general  Patient location during evaluation: PACU  Patient participation: Yes- Able to Participate  Level of consciousness: awake and alert and oriented  Post-procedure vital signs: reviewed and stable  Pain management: adequate  Airway patency: patent  PONV status at discharge: No PONV  Anesthetic complications: no      Cardiovascular status: blood pressure returned to baseline  Respiratory status: unassisted  Hydration status: euvolemic  Follow-up not needed.          Vitals Value Taken Time   /86 11/7/2019 12:28 PM   Temp 36.9 °C (98.5 °F) 11/7/2019 12:17 PM   Pulse 78 11/7/2019 12:17 PM   Resp 16 11/7/2019 12:17 PM   SpO2 97 % 11/7/2019 12:17 PM         Event Time     Out of Recovery 14:00:00          Pain/Sandra Score: Pain Rating Prior to Med Admin: 9 (11/7/2019  1:56 AM)  Pain Rating Post Med Admin: 0 (11/6/2019  3:19 PM)  Sandra Score: 9 (11/6/2019  3:19 PM)

## 2019-11-07 NOTE — PROGRESS NOTES
[facility]  Otorhinolaryngology-Head & Neck Surgery  Progress Note    Subjective:     Post-Op Info:  Procedure(s) (LRB):  DISSECTION, NECK (Right)   1 Day Post-Op  Hospital Day: 2     Interval History: NAEON. Drain not holding suction    Medications:  Continuous Infusions:  Scheduled Meds:   carvedilol  3.125 mg Oral BID    enoxaparin  40 mg Subcutaneous Daily    levothyroxine  50 mcg Oral Before breakfast    simvastatin  40 mg Oral QHS     PRN Meds:Dextrose 10% Bolus, Dextrose 10% Bolus, glucagon (human recombinant), glucose, glucose, HYDROcodone-acetaminophen, insulin aspart U-100, morphine, nitroGLYCERIN, ondansetron, promethazine (PHENERGAN) IVPB     Review of patient's allergies indicates:  No Known Allergies  Objective:     Vital Signs (24h Range):  Temp:  [97 °F (36.1 °C)-98.5 °F (36.9 °C)] 98.5 °F (36.9 °C)  Pulse:  [61-90] 86  Resp:  [12-19] 18  SpO2:  [95 %-100 %] 97 %  BP: (126-146)/(65-81) 146/70       Lines/Drains/Airways     Central Venous Catheter Line                 Port A Cath Single Lumen 12/14/18 0800 327 days          Drain                 Closed/Suction Drain 11/06/19 1247 Medial Neck Bulb 15 Fr. less than 1 day         Closed/Suction Drain 11/06/19 1248 Right;Lateral Neck Bulb 15 Fr. less than 1 day          Peripheral Intravenous Line                 Peripheral IV - Single Lumen 11/06/19 0840 18 G Left Forearm less than 1 day         Peripheral IV - Single Lumen 11/06/19 1007 18 G Left Hand less than 1 day                Physical Exam  NAD  Awake and alert  Head AT/NC  Auricles WNL AU  Nose w/ normal external appearance  MMM, anterior tongue mobile, FOM soft  Neck soft, not TTP, normal ROM, incision c/d/i w dermabond in place. Anterior DEMETRIO holding suction, posterior DEMETRIO not holding suction (on continuous wall suction)  Normal WOB, no stridor or stertor    Significant Labs:  CBC: No results for input(s): WBC, RBC, HGB, HCT, PLT, MCV, MCH, MCHC in the last 168 hours.  CMP:  No results for input(s): GLU, CALCIUM, ALBUMIN, PROT, NA, K, CO2, CL, BUN, CREATININE, ALKPHOS, ALT, AST, BILITOT in the last 168 hours.    Significant Diagnostics:  None    Assessment/Plan:     * Secondary malignant neoplasm of cervical lymph node  S/p aborted SND with sacrifice of IJV and CN XI POD#1    - continue drains LCWS  - morphine, norco PRN  - ADAT  - d/c this afternoon pending drain status        Romario Dsouza MD  Otorhinolaryngology-Head & Neck Surgery  [facility]

## 2019-11-07 NOTE — PLAN OF CARE
Quiet hours. Pain controlled with current meds. Samy drains remains to wall suction. VSS. Safety maintained.

## 2019-11-08 VITALS
DIASTOLIC BLOOD PRESSURE: 88 MMHG | RESPIRATION RATE: 19 BRPM | WEIGHT: 252 LBS | BODY MASS INDEX: 35.28 KG/M2 | OXYGEN SATURATION: 96 % | TEMPERATURE: 98 F | HEIGHT: 71 IN | HEART RATE: 92 BPM | SYSTOLIC BLOOD PRESSURE: 136 MMHG

## 2019-11-08 PROCEDURE — 25000003 PHARM REV CODE 250: Performed by: OTOLARYNGOLOGY

## 2019-11-08 RX ORDER — ONDANSETRON 4 MG/1
4 TABLET, ORALLY DISINTEGRATING ORAL EVERY 6 HOURS PRN
Qty: 20 TABLET | Refills: 0 | Status: SHIPPED | OUTPATIENT
Start: 2019-11-08 | End: 2019-12-04 | Stop reason: SDUPTHER

## 2019-11-08 RX ORDER — HYDROCODONE BITARTRATE AND ACETAMINOPHEN 5; 325 MG/1; MG/1
1 TABLET ORAL EVERY 4 HOURS PRN
Qty: 30 TABLET | Refills: 0 | Status: SHIPPED | OUTPATIENT
Start: 2019-11-08 | End: 2021-01-01

## 2019-11-08 RX ADMIN — CARVEDILOL 3.12 MG: 3.12 TABLET, FILM COATED ORAL at 08:11

## 2019-11-08 RX ADMIN — LEVOTHYROXINE SODIUM 50 MCG: 50 TABLET ORAL at 06:11

## 2019-11-08 RX ADMIN — HYDROCODONE BITARTRATE AND ACETAMINOPHEN 1 TABLET: 5; 325 TABLET ORAL at 08:11

## 2019-11-08 NOTE — PLAN OF CARE
SW following for DC needs.    No SW needs identified at this time.      11/08/19 1038   Post-Acute Status   Post-Acute Authorization Other   Other Status No Post-Acute Service Needs     Sharlene Pascal LMSW  [facility]  [id number]

## 2019-11-08 NOTE — PLAN OF CARE
11/08/19 1100   Final Note   Assessment Type Final Discharge Note   Anticipated Discharge Disposition Home   What phone number can be called within the next 1-3 days to see how you are doing after discharge? [phone]   Hospital Follow Up  Appt(s) scheduled? Yes   Right Care Referral Info   Post Acute Recommendation No Care     Future Appointments   Date Time Provider Department Center   11/25/2019  1:00 PM LAB, [facility] RJ BIRD [facility] [place] [facility]   11/25/2019  1:30 PM Serena Blount MD [facility]

## 2019-11-08 NOTE — SUBJECTIVE & OBJECTIVE
Interval History: NAEON.     Medications:  Continuous Infusions:  Scheduled Meds:   carvedilol  3.125 mg Oral BID    enoxaparin  40 mg Subcutaneous Daily    levothyroxine  50 mcg Oral Before breakfast    simvastatin  40 mg Oral QHS     PRN Meds:Dextrose 10% Bolus, Dextrose 10% Bolus, glucagon (human recombinant), glucose, glucose, HYDROcodone-acetaminophen, insulin aspart U-100, morphine, nitroGLYCERIN, ondansetron, promethazine (PHENERGAN) IVPB     Review of patient's allergies indicates:  No Known Allergies  Objective:     Vital Signs (24h Range):  Temp:  [96.1 °F (35.6 °C)-98.5 °F (36.9 °C)] 98.2 °F (36.8 °C)  Pulse:  [72-98] 87  Resp:  [16-18] 18  SpO2:  [93 %-98 %] 93 %  BP: (120-157)/(67-86) 134/76       Lines/Drains/Airways     Central Venous Catheter Line                 Port A Cath Single Lumen 12/14/18 0800 328 days          Drain                 Closed/Suction Drain 11/06/19 1247 Medial Neck Bulb 15 Fr. 1 day         Closed/Suction Drain 11/06/19 1248 Right;Lateral Neck Bulb 15 Fr. 1 day          Peripheral Intravenous Line                 Peripheral IV - Single Lumen 11/06/19 0840 18 G Left Forearm 1 day         Peripheral IV - Single Lumen 11/06/19 1007 18 G Left Hand 1 day                Physical Exam    NAD  Awake and alert  Head AT/NC  Auricles WNL AU  Nose w/ normal external appearance  MMM, anterior tongue mobile, FOM soft  Neck soft, not TTP, normal ROM, incision c/d/i w dermabond in place. Anterior DEMETRIO holding suction, posterior DEMETRIO not holding suction (on continuous wall suction)  Normal WOB, no stridor or stertor    Significant Labs:  CBC: No results for input(s): WBC, RBC, HGB, HCT, PLT, MCV, MCH, MCHC in the last 168 hours.  CMP: No results for input(s): GLU, CALCIUM, ALBUMIN, PROT, NA, K, CO2, CL, BUN, CREATININE, ALKPHOS, ALT, AST, BILITOT in the last 168 hours.    Significant Diagnostics:  None

## 2019-11-08 NOTE — PROGRESS NOTES
[facility]  Otorhinolaryngology-Head & Neck Surgery  Progress Note    Subjective:     Post-Op Info:  Procedure(s) (LRB):  DISSECTION, NECK (Right)   2 Days Post-Op  Hospital Day: 3     Interval History: NAEON.     Medications:  Continuous Infusions:  Scheduled Meds:   carvedilol  3.125 mg Oral BID    enoxaparin  40 mg Subcutaneous Daily    levothyroxine  50 mcg Oral Before breakfast    simvastatin  40 mg Oral QHS     PRN Meds:Dextrose 10% Bolus, Dextrose 10% Bolus, glucagon (human recombinant), glucose, glucose, HYDROcodone-acetaminophen, insulin aspart U-100, morphine, nitroGLYCERIN, ondansetron, promethazine (PHENERGAN) IVPB     Review of patient's allergies indicates:  No Known Allergies  Objective:     Vital Signs (24h Range):  Temp:  [96.1 °F (35.6 °C)-98.5 °F (36.9 °C)] 98.2 °F (36.8 °C)  Pulse:  [72-98] 87  Resp:  [16-18] 18  SpO2:  [93 %-98 %] 93 %  BP: (120-157)/(67-86) 134/76       Lines/Drains/Airways     Central Venous Catheter Line                 Port A Cath Single Lumen 12/14/18 0800 328 days          Drain                 Closed/Suction Drain 11/06/19 1247 Medial Neck Bulb 15 Fr. 1 day         Closed/Suction Drain 11/06/19 1248 Right;Lateral Neck Bulb 15 Fr. 1 day          Peripheral Intravenous Line                 Peripheral IV - Single Lumen 11/06/19 0840 18 G Left Forearm 1 day         Peripheral IV - Single Lumen 11/06/19 1007 18 G Left Hand 1 day                Physical Exam    NAD  Awake and alert  Head AT/NC  Auricles WNL AU  Nose w/ normal external appearance  MMM, anterior tongue mobile, FOM soft  Neck soft, not TTP, normal ROM, incision c/d/i w dermabond in place. Anterior DEMETRIO holding suction, posterior DEMETRIO not holding suction (on continuous wall suction)  Normal WOB, no stridor or stertor    Significant Labs:  CBC: No results for input(s): WBC, RBC, HGB, HCT, PLT, MCV, MCH, MCHC in the last 168 hours.  CMP: No results for input(s): GLU, CALCIUM, ALBUMIN, PROT, NA, K,  CO2, CL, BUN, CREATININE, ALKPHOS, ALT, AST, BILITOT in the last 168 hours.    Significant Diagnostics:  None    Assessment/Plan:     * Secondary malignant neoplasm of cervical lymph node  S/p aborted SND with sacrifice of IJV and CN XI POD#2    - drain harvest today  - morphine, norco PRN  - ADAT  - d/c home        Romario Dsouza MD  Otorhinolaryngology-Head & Neck Surgery  [facility]

## 2019-11-08 NOTE — ASSESSMENT & PLAN NOTE
S/p aborted SND with sacrifice of IJV and CN XI POD#2    - drain harvest today  - morphine, norco PRN  - ADAT  - d/c home

## 2019-11-08 NOTE — PLAN OF CARE
quiet hours. Pain controlled with current meds. Samy drains remain intact to wall suction with minimal drainage. VSS. Safety maintained.

## 2019-11-08 NOTE — DISCHARGE SUMMARY
[facility]  Short Stay  Discharge Summary    Admit Date: 11/6/2019    Discharge Date and Time:  11/08/2019 7:22 AM      Discharge Attending Physician: Stu Romano MD     Hospital Course (synopsis of major diagnoses, care, treatment, and services provided during the course of the hospital stay): Following completion of an electively scheduled procedure, the patient was transferred to the floor for postoperative monitoring. his hospital course was uneventful and noted for adequate pain control and PO intake following surgery. he is discharged home in good condition and will follow-up with ENT in 1 week.      Final Diagnoses:    Principal Problem: Secondary malignant neoplasm of cervical lymph node   Secondary Diagnoses:   Active Hospital Problems    Diagnosis  POA    *Secondary malignant neoplasm of cervical lymph node [C77.0]  Yes      Resolved Hospital Problems   No resolved problems to display.       Discharged Condition: good    Disposition: Home or Self Care    Follow up/Patient Instructions:    Medications:  Reconciled Home Medications:      Medication List      START taking these medications    ondansetron 4 MG Tbdl  Commonly known as:  ZOFRAN-ODT  Take 1 tablet (4 mg total) by mouth every 6 (six) hours as needed.        CHANGE how you take these medications    carvedilol 6.25 MG tablet  Commonly known as:  COREG  Take half tab a day for 1 week then take 1 tab daily thereafter  What changed:    · how much to take  · how to take this  · when to take this     HYDROcodone-acetaminophen 5-325 mg per tablet  Commonly known as:  NORCO  Take 1 tablet by mouth every 4 (four) hours as needed.  What changed:  See the new instructions.        CONTINUE taking these medications    aspirin 81 MG Chew  Take 81 mg by mouth once daily. Coated ASA     glipiZIDE 5 MG tablet  Commonly known as:  GLUCOTROL  Take 10 mg by mouth daily with breakfast.     indomethacin 50 MG capsule  Commonly known as:   INDOCIN  indomethacin 50 mg capsule   TAKE ONE CAPSULE BY MOUTH 3 TIMES A DAY AS NEEDED FOR 5 DAYS     levothyroxine 50 MCG tablet  Commonly known as:  SYNTHROID  Take 50 mcg by mouth once daily.     nitroGLYCERIN 0.3 MG SL tablet  Commonly known as:  NITROSTAT  Place 0.3 mg under the tongue every 5 (five) minutes as needed for Chest pain.     pioglitazone 45 MG tablet  Commonly known as:  ACTOS  Take 45 mg by mouth once daily.     simvastatin 40 MG tablet  Commonly known as:  ZOCOR  Take 40 mg by mouth every evening.          Discharge Procedure Orders   Diet Adult Regular     Other restrictions (specify):   Order Comments: Light activity only. No heavy lifting (> 10 lb), straining, stooping, or exercising.     Notify your health care provider if you experience any of the following:  temperature >100.4     Notify your health care provider if you experience any of the following:  persistent nausea and vomiting or diarrhea     Notify your health care provider if you experience any of the following:  severe uncontrolled pain     Notify your health care provider if you experience any of the following:  redness, tenderness, or signs of infection (pain, swelling, redness, odor or green/yellow discharge around incision site)     Notify your health care provider if you experience any of the following:  difficulty breathing or increased cough     No dressing needed     Follow-up Information     Olga Tyson NP In 1 week.    Specialty:  Otolaryngology  Contact information:  [address]  [facility] [id number]  [phone]

## 2019-11-11 ENCOUNTER — TELEPHONE (OUTPATIENT)
Dept: HEMATOLOGY/ONCOLOGY | Facility: CLINIC | Age: 64
End: 2019-11-11

## 2019-11-11 NOTE — TELEPHONE ENCOUNTER
Returned call and spoke with Ms Gray, Mr Mendez wife. Mr Mendez scheduled at the [facility] location with Dr Blount and labs. Ms Sellersyle calling to rescheduled his appointment at [facility] or [facility] location. Mr Mendez do not wish to continue his care at [facility]. Patient scheduled for his 4 week post surgery f/u visit on Dec. 4th with labs @ [facility]. Appointment information mailed to Mr Mendez home.

## 2019-11-11 NOTE — TELEPHONE ENCOUNTER
----- Message from Angela Rutledge sent at 11/11/2019  9:00 AM CST -----  Contact: Marina Mendez (wife): [phone]  Pt's wife would like to change the location of the pt's 11/25, would like to come to [facility] for any future appts    Please contact Marina Mendez (wife): [phone]

## 2019-11-11 NOTE — TELEPHONE ENCOUNTER
----- Message from Amy Alexander sent at 11/11/2019 11:24 AM CST -----  Contact: pt   Pt wife called to speak with nurse   Callback#[phone]  Thank You  RADHA Alexander

## 2019-11-11 NOTE — TELEPHONE ENCOUNTER
----- Message from Serena Blount MD sent at 11/11/2019 10:51 AM CST -----  Contact: Marina Mendez (wife): [phone]  Established patient. I saw him at [facility]. He just had surgery on 11/6. I can see him 4 weeks after that. Can you also ask wife that I go to both [facility] and [facility], are they okay with either or only prefer [facility]? thanks  ----- Message -----  From: Tejas Gallagher MA  Sent: 11/11/2019  10:38 AM CST  To: Serena Blount MD    Not sure how to reschedule this appointment. Should I scheduled as a new consult or have you seen this patient before at [facility]? Or should this be forward to a navigator.  Please advise.  ----- Message -----  From: Angela Rutledge  Sent: 11/11/2019   9:00 AM CST  To: [facility] Staff    Pt's wife would like to change the location of the pt's 11/25, would like to come to [facility] only for any future appts    Please contact Marina Mendez (wife): [phone]

## 2019-11-13 ENCOUNTER — OFFICE VISIT (OUTPATIENT)
Dept: OTOLARYNGOLOGY | Facility: CLINIC | Age: 64
End: 2019-11-13
Payer: COMMERCIAL

## 2019-11-13 VITALS
WEIGHT: 249.56 LBS | DIASTOLIC BLOOD PRESSURE: 80 MMHG | SYSTOLIC BLOOD PRESSURE: 117 MMHG | HEART RATE: 84 BPM | BODY MASS INDEX: 34.81 KG/M2 | TEMPERATURE: 98 F

## 2019-11-13 DIAGNOSIS — C09.9 TONSILLAR CANCER: Primary | ICD-10-CM

## 2019-11-13 PROCEDURE — 99999 PR PBB SHADOW E&M-EST. PATIENT-LVL III: CPT | Mod: PBBFAC,,, | Performed by: NURSE PRACTITIONER

## 2019-11-13 PROCEDURE — 99024 PR POST-OP FOLLOW-UP VISIT: ICD-10-PCS | Mod: S$GLB,,, | Performed by: NURSE PRACTITIONER

## 2019-11-13 PROCEDURE — 99999 PR PBB SHADOW E&M-EST. PATIENT-LVL III: ICD-10-PCS | Mod: PBBFAC,,, | Performed by: NURSE PRACTITIONER

## 2019-11-13 PROCEDURE — 99024 POSTOP FOLLOW-UP VISIT: CPT | Mod: S$GLB,,, | Performed by: NURSE PRACTITIONER

## 2019-11-13 NOTE — ASSESSMENT & PLAN NOTE
Jerrod Mendez is healing well. Sutures removed. We discussed PT/OT for his shoulder, but he wishes to defer that at this time. He will follow up with oncology as planned. Questions answered. RTC prn.

## 2019-11-13 NOTE — PROGRESS NOTES
Chief Complaint   Patient presents with    Post-op Evaluation        Tonsillar cancer    12/5/2018 Initial Diagnosis     Tonsillar cancer      10/8/2019 -  Chemotherapy     Treatment Summary   Plan Name: OP HEAD AND NECK PEMBROLIZUMAB FLUOROURACIL CARBOPLATIN Q3W  Treatment Goal: Control  Status: Active  Start Date: 10/21/2019 (Planned)  End Date: 10/4/2021 (Planned)  Provider: Serena Blount MD  Chemotherapy: CARBOplatin (PARAPLATIN) in sodium chloride 0.9% 250 mL chemo infusion,  (original dose ), Intravenous, Clinic/HOD 1 time, 0 of 6 cycles  Dose modification:   (Cycle 1)  fluorouracil (ADRUCIL) in sodium chloride 0.9% 240 mL chemo infusion, 1,000 mg/m2/day (original dose ), Intravenous, Over 96 hours, 0 of 6 cycles  Dose modification: 1,000 mg/m2/day (Cycle 1)  pembrolizumab (KEYTRUDA) 200 mg in sodium chloride 0.9% 100 mL chemo infusion, 200 mg, Intravenous, Clinic/HOD 1 time, 0 of 35 cycles      11/6/2019 Surgery     Modified neck dissection involving levels 2 through 5 with sacrifice of the spinal accessory nerve and ligation of the right internal jugular vein           HPI   64 y.o. male returns for a post op visit. He has been doing well since surgery. He has pain to his right neck and shoulder, but does not want to take pain medication. He is transferring his oncology care to [facility] and is seeing Dr. Blount in a few weeks.     Review of Systems   Constitutional: Negative for fatigue and unexpected weight change.   HENT: Per HPI.  Eyes: Negative for visual disturbance.   Respiratory: Negative for shortness of breath, hemoptysis   Cardiovascular: Negative for chest pain and palpitations.   Musculoskeletal: Negative for decreased ROM, back pain.   Skin: Negative for rash, sunburn, itching.   Neurological: Negative for dizziness and seizures.   Hematological: Negative for adenopathy. Does not bruise/bleed easily.   Endocrine: Negative for rapid weight loss/weight gain, heat/cold intolerance.     Past Medical History    Patient Active Problem List   Diagnosis    Obstructive sleep apnea (adult) (pediatric)    TIA involving carotid artery    Essential hypertension    Type 2 diabetes mellitus with diabetic neuropathy, without long-term current use of insulin    CAD (coronary artery disease)    Hyperlipidemia    Neck mass    Anterior communicating artery aneurysm    Tonsillar cancer    Dysphagia, oropharyngeal    History of transient ischemic attack    Thoracic back sprain    Low back pain    Dysphagia    Hypertensive disorder    Hyperlipidemia    Sleep apnea    Intracranial aneurysm    Transient cerebral ischemia    Tonsillar mass    Neoplasm of tonsil    Gout    Encounter for education    CINV (chemotherapy-induced nausea and vomiting)    Dehydration, severe    Acute right-sided back pain    Pancytopenia    Elevated serum creatinine    Poor appetite    Oropharyngeal dysphagia    Fatigue    History of hematuria    Drug-induced membranous glomerulonephritis    Lipoma of skin    Obesity with body mass index 30 or greater    Onychomycosis    Sprain of rotator cuff capsule    Tinea corporis    Urolithiasis    CKD (chronic kidney disease) stage 3, GFR 30-59 ml/min    Flank pain    Supraclavicular lymphadenopathy    Coronary angioplasty status    Hypothyroid    Elevated bilirubin    Enlarged prostate    Edema    Secondary malignant neoplasm of cervical lymph node           Past Surgical History   Past Surgical History:   Procedure Laterality Date    CHOLECYSTECTOMY      CORONARY ANGIOPLASTY WITH STENT PLACEMENT      DISSECTION OF NECK Right 11/6/2019    Procedure: DISSECTION, NECK;  Surgeon: Stu Romano MD;  Location: [facility] OR [address] [place] [address];  Service: ENT;  Laterality: Right;    ESOPHAGOGASTRODUODENOSCOPY      FINE NEEDLE ASPIRATION Right     neck mass    GASTROSTOMY TUBE PLACEMENT      LIPOMA RESECTION      back    PEG TUBE REMOVAL N/A 5/28/2019    Procedure: REMOVAL, PEG TUBE;   Surgeon: Milton Friedman MD;  Location: [facility];  Service: Endoscopy;  Laterality: N/A;    PORTACATH PLACEMENT           Family History   Family History   Problem Relation Age of Onset    Diabetes Mother     Hypertension Mother     Cancer Father     Heart attack Father     Heart disease Paternal Grandmother            Social History   .  Social History     Socioeconomic History    Marital status:      Spouse name: Not on file    Number of children: Not on file    Years of education: Not on file    Highest education level: Not on file   Occupational History    Not on file   Social Needs    Financial resource strain: Not on file    Food insecurity:     Worry: Not on file     Inability: Not on file    Transportation needs:     Medical: Not on file     Non-medical: Not on file   Tobacco Use    Smoking status: Former Smoker     Types: Cigarettes     Last attempt to quit:      Years since quittin.8    Smokeless tobacco: Former User    Tobacco comment: quit     Substance and Sexual Activity    Alcohol use: Yes     Comment: one beer a month    Drug use: No    Sexual activity: Not on file     Comment:    Lifestyle    Physical activity:     Days per week: Not on file     Minutes per session: Not on file    Stress: Not on file   Relationships    Social connections:     Talks on phone: Not on file     Gets together: Not on file     Attends Gnosticist service: Not on file     Active member of club or organization: Not on file     Attends meetings of clubs or organizations: Not on file     Relationship status: Not on file   Other Topics Concern    Not on file   Social History Narrative    Not on file         Allergies   Review of patient's allergies indicates:  No Known Allergies        Physical Exam     Vitals:    19 1303   BP: 117/80   Pulse: 84   Temp: 98.2 °F (36.8 °C)         Body mass index is 34.81 kg/m².      General: AOx3, NAD   Respiratory:  Symmetric chest  rise, normal effort  Right Ear: External Auditory Canal WNL,TM w/o masses/lesions/perforations.  Middle ear without evidence of effusion.   Left Ear: External Auditory Canal WNL,TM w/o masses/lesions/perforations.  Middle ear without evidence of effusion.   Neck: Right neck incisions CDI.  No redness, drainage, or fluid collection noted.  Edges well approximated. Sutures removed without difficulty.  Face: House Brackmann I bilaterally.   Right shoulder: limited ROM with abduction    FINAL PATHOLOGIC DIAGNOSIS  1. Tissue at right skull base, biopsy:  Invasive squamous cell carcinoma, nonkeratinizing with basaloid features.  2. Right neck, level 5, dissection:  Invasive squamous cell carcinoma, nonkeratinizing with basaloid features, involving fibroadipose tissues (5.0 cm  metastatic deposit).  One separate small lymph node negative for malignancy (0/1).  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 P16 is  strongly and diffusely positive in tumor cells, in keeping with HPV related etiology. No lymphoid tissue is identified  associated with the metastatic mass, although it may have originated from a completely replaced lymph node not  represented. Clinical correlation is advised.    Assessment/Plan  Problem List Items Addressed This Visit        Oncology    Tonsillar cancer - Primary     Jerrod Mendez is healing well. Sutures removed. We discussed PT/OT for his shoulder, but he wishes to defer that at this time. He will follow up with oncology as planned. Questions answered. RTC prn.

## 2019-11-25 ENCOUNTER — OFFICE VISIT (OUTPATIENT)
Dept: OTOLARYNGOLOGY | Facility: CLINIC | Age: 64
End: 2019-11-25
Payer: COMMERCIAL

## 2019-11-25 ENCOUNTER — HOSPITAL ENCOUNTER (EMERGENCY)
Facility: HOSPITAL | Age: 64
Discharge: HOME | End: 2019-11-25
Attending: EMERGENCY MEDICINE
Payer: COMMERCIAL

## 2019-11-25 VITALS
BODY MASS INDEX: 35.48 KG/M2 | HEART RATE: 89 BPM | SYSTOLIC BLOOD PRESSURE: 133 MMHG | DIASTOLIC BLOOD PRESSURE: 100 MMHG | WEIGHT: 254.44 LBS

## 2019-11-25 VITALS
WEIGHT: 250 LBS | BODY MASS INDEX: 35 KG/M2 | SYSTOLIC BLOOD PRESSURE: 135 MMHG | RESPIRATION RATE: 18 BRPM | OXYGEN SATURATION: 99 % | DIASTOLIC BLOOD PRESSURE: 67 MMHG | HEIGHT: 71 IN | TEMPERATURE: 98 F | HEART RATE: 77 BPM

## 2019-11-25 DIAGNOSIS — L76.34 POSTOPERATIVE SEROMA OF SKIN AFTER NON-DERMATOLOGIC PROCEDURE: Primary | ICD-10-CM

## 2019-11-25 DIAGNOSIS — C09.9 TONSILLAR CANCER: ICD-10-CM

## 2019-11-25 DIAGNOSIS — D49.0 NEOPLASM OF TONSIL: Primary | ICD-10-CM

## 2019-11-25 DIAGNOSIS — C77.0 SECONDARY MALIGNANT NEOPLASM OF CERVICAL LYMPH NODE: ICD-10-CM

## 2019-11-25 PROBLEM — M96.843 POSTPROCEDURAL SEROMA OF A MUSCULOSKELETAL STRUCTURE FOLLOWING OTHER PROCEDURE: Status: ACTIVE | Noted: 2019-11-25

## 2019-11-25 LAB
ALBUMIN SERPL BCP-MCNC: 3.7 G/DL (ref 3.5–5.2)
ALP SERPL-CCNC: 80 U/L (ref 55–135)
ALT SERPL W/O P-5'-P-CCNC: 15 U/L (ref 10–44)
ANION GAP SERPL CALC-SCNC: 10 MMOL/L (ref 8–16)
AST SERPL-CCNC: 15 U/L (ref 10–40)
BASOPHILS # BLD AUTO: 0.04 K/UL (ref 0–0.2)
BASOPHILS NFR BLD: 0.6 % (ref 0–1.9)
BILIRUB SERPL-MCNC: 1.4 MG/DL (ref 0.1–1)
BUN SERPL-MCNC: 27 MG/DL (ref 8–23)
CALCIUM SERPL-MCNC: 9.5 MG/DL (ref 8.7–10.5)
CHLORIDE SERPL-SCNC: 105 MMOL/L (ref 95–110)
CO2 SERPL-SCNC: 24 MMOL/L (ref 23–29)
CREAT SERPL-MCNC: 2 MG/DL (ref 0.5–1.4)
DIFFERENTIAL METHOD: ABNORMAL
EOSINOPHIL # BLD AUTO: 0.5 K/UL (ref 0–0.5)
EOSINOPHIL NFR BLD: 7.6 % (ref 0–8)
ERYTHROCYTE [DISTWIDTH] IN BLOOD BY AUTOMATED COUNT: 12.6 % (ref 11.5–14.5)
EST. GFR  (AFRICAN AMERICAN): 40.2 ML/MIN/1.73 M^2
EST. GFR  (NON AFRICAN AMERICAN): 34.7 ML/MIN/1.73 M^2
GLUCOSE SERPL-MCNC: 94 MG/DL (ref 70–110)
HCT VFR BLD AUTO: 34.2 % (ref 40–54)
HGB BLD-MCNC: 11.2 G/DL (ref 14–18)
IMM GRANULOCYTES # BLD AUTO: 0.03 K/UL (ref 0–0.04)
IMM GRANULOCYTES NFR BLD AUTO: 0.5 % (ref 0–0.5)
LYMPHOCYTES # BLD AUTO: 0.8 K/UL (ref 1–4.8)
LYMPHOCYTES NFR BLD: 13.2 % (ref 18–48)
MCH RBC QN AUTO: 31.1 PG (ref 27–31)
MCHC RBC AUTO-ENTMCNC: 32.7 G/DL (ref 32–36)
MCV RBC AUTO: 95 FL (ref 82–98)
MONOCYTES # BLD AUTO: 0.5 K/UL (ref 0.3–1)
MONOCYTES NFR BLD: 8.5 % (ref 4–15)
NEUTROPHILS # BLD AUTO: 4.3 K/UL (ref 1.8–7.7)
NEUTROPHILS NFR BLD: 69.6 % (ref 38–73)
NRBC BLD-RTO: 0 /100 WBC
PLATELET # BLD AUTO: 231 K/UL (ref 150–350)
PMV BLD AUTO: 9 FL (ref 9.2–12.9)
POTASSIUM SERPL-SCNC: 4 MMOL/L (ref 3.5–5.1)
PROT SERPL-MCNC: 6.8 G/DL (ref 6–8.4)
RBC # BLD AUTO: 3.6 M/UL (ref 4.6–6.2)
SODIUM SERPL-SCNC: 139 MMOL/L (ref 136–145)
WBC # BLD AUTO: 6.21 K/UL (ref 3.9–12.7)

## 2019-11-25 PROCEDURE — 80053 COMPREHEN METABOLIC PANEL: CPT

## 2019-11-25 PROCEDURE — 99999 PR PBB SHADOW E&M-EST. PATIENT-LVL III: ICD-10-PCS | Mod: PBBFAC,,, | Performed by: NURSE PRACTITIONER

## 2019-11-25 PROCEDURE — 85025 COMPLETE CBC W/AUTO DIFF WBC: CPT

## 2019-11-25 PROCEDURE — 99999 PR PBB SHADOW E&M-EST. PATIENT-LVL III: CPT | Mod: PBBFAC,,, | Performed by: NURSE PRACTITIONER

## 2019-11-25 PROCEDURE — 99024 PR POST-OP FOLLOW-UP VISIT: ICD-10-PCS | Mod: S$GLB,,, | Performed by: NURSE PRACTITIONER

## 2019-11-25 PROCEDURE — 99024 POSTOP FOLLOW-UP VISIT: CPT | Mod: S$GLB,,, | Performed by: NURSE PRACTITIONER

## 2019-11-25 PROCEDURE — 99283 PR EMERGENCY DEPT VISIT,LEVEL III: ICD-10-PCS | Mod: ,,, | Performed by: EMERGENCY MEDICINE

## 2019-11-25 PROCEDURE — 99283 EMERGENCY DEPT VISIT LOW MDM: CPT | Mod: ,,, | Performed by: EMERGENCY MEDICINE

## 2019-11-25 PROCEDURE — 99283 EMERGENCY DEPT VISIT LOW MDM: CPT

## 2019-11-25 NOTE — DISCHARGE INSTRUCTIONS
Diagnosis: postoperative seroma    Use compressive dressing to surgical site.    Tests today showed:   Labs Reviewed   CBC W/ AUTO DIFFERENTIAL - Abnormal; Notable for the following components:       Result Value    RBC 3.60 (*)     Hemoglobin 11.2 (*)     Hematocrit 34.2 (*)     Mean Corpuscular Hemoglobin 31.1 (*)     MPV 9.0 (*)     Lymph # 0.8 (*)     Lymph% 13.2 (*)     All other components within normal limits   COMPREHENSIVE METABOLIC PANEL - Abnormal; Notable for the following components:    BUN, Bld 27 (*)     Creatinine 2.0 (*)     Total Bilirubin 1.4 (*)     eGFR if  40.2 (*)     eGFR if non  34.7 (*)     All other components within normal limits     Imaging Results    None         Treatments you had today:   Medications - No data to display    Follow-Up Plan:  - Follow-up with primary care doctor within 3 - 5 days  - Additional testing and/or evaluation as directed by your primary doctor    Return to the Emergency Department for symptoms including but not limited to: worsening symptoms, shortness of breath or chest pain, vomiting with inability to hold down fluids, fevers greater than 100.4°F, passing out/fainting/unconsciousness, or other concerning symptoms.

## 2019-11-25 NOTE — ED PROVIDER NOTES
"Source of History:  Patient    Chief complaint:  ENT consult (transfer from [facility] the [facility] for ENT)    HPI:  Jerrod Mendez is a 64 y.o. male with history of :     Modified neck dissection involving levels 2 through 5 with sacrifice of the spinal accessory nerve and ligation of the right internal jugular vein     On 11/6/2019 for tonsillar cancer presenting to emergency department as transfer from outside hospital for fluid collection in the neck.  The patient states that this evening he noticed that 1 of the wounds opened up and some discharge was present.  It did not appear to be like pus.  He denies any fevers, chills, difficulty breathing or swallowing, neck swelling, voice changes, or any other complaints at this time.  He denies pain.    Per transfer note, "Ddrain removal 4 days later, pt report feeling lump, now draining fluid, no airway compromise.     Ultrasound shows : 2 Post surgical fluid collection  3.3 x 1.2 cm &  1.4 x 1.4 x 0.03 cm"        ROS: As per HPI and below:  Review of Systems   Constitutional: Negative for chills and fever.   HENT: Negative for sore throat.    Eyes: Negative for double vision.   Respiratory: Negative for cough and shortness of breath.    Cardiovascular: Negative for chest pain.   Gastrointestinal: Negative for abdominal pain and vomiting.   Genitourinary: Negative for dysuria.   Musculoskeletal: Negative for falls.   Skin: Negative for rash.   Neurological: Negative for headaches.     Review of patient's allergies indicates:  No Known Allergies    No current facility-administered medications on file prior to encounter.      Current Outpatient Medications on File Prior to Encounter   Medication Sig Dispense Refill    aspirin 81 MG Chew Take 81 mg by mouth once daily. Coated ASA      carvedilol (COREG) 6.25 MG tablet Take half tab a day for 1 week then take 1 tab daily thereafter (Patient taking differently: Take 3.125 mg by mouth 2 (two) times daily. Take half tab a day " for 1 week then take 1 tab daily thereafter) 60 tablet 11    glipiZIDE (GLUCOTROL) 5 MG tablet Take 10 mg by mouth daily with breakfast.       HYDROcodone-acetaminophen (NORCO) 5-325 mg per tablet Take 1 tablet by mouth every 4 (four) hours as needed. 30 tablet 0    indomethacin (INDOCIN) 50 MG capsule indomethacin 50 mg capsule   TAKE ONE CAPSULE BY MOUTH 3 TIMES A DAY AS NEEDED FOR 5 DAYS      levothyroxine (SYNTHROID) 50 MCG tablet Take 50 mcg by mouth once daily.  3    nitroGLYCERIN (NITROSTAT) 0.3 MG SL tablet Place 0.3 mg under the tongue every 5 (five) minutes as needed for Chest pain.      ondansetron (ZOFRAN-ODT) 4 MG TbDL Dissolve 1 tablet (4 mg total) by mouth every 6 (six) hours as needed. 20 tablet 0    pioglitazone (ACTOS) 45 MG tablet Take 45 mg by mouth once daily.      simvastatin (ZOCOR) 40 MG tablet Take 40 mg by mouth every evening.         PMH:  As per HPI and below:  Past Medical History:   Diagnosis Date    Back pain     Brain aneurysm     Cancer     tonsils    Coronary artery disease     Diabetes mellitus     Gout     High cholesterol     Hypertension     Oropharyngeal dysphagia     JIM (obstructive sleep apnea)     Stroke     tia    TIA (transient ischemic attack)      Past Surgical History:   Procedure Laterality Date    CHOLECYSTECTOMY      CORONARY ANGIOPLASTY WITH STENT PLACEMENT      DISSECTION OF NECK Right 11/6/2019    Procedure: DISSECTION, NECK;  Surgeon: Stu Romano MD;  Location: [facility] OR [address] [place] [address];  Service: ENT;  Laterality: Right;    ESOPHAGOGASTRODUODENOSCOPY      FINE NEEDLE ASPIRATION Right     neck mass    GASTROSTOMY TUBE PLACEMENT      LIPOMA RESECTION      back    PEG TUBE REMOVAL N/A 5/28/2019    Procedure: REMOVAL, PEG TUBE;  Surgeon: Milton Friedman MD;  Location: [facility] ENDO;  Service: Endoscopy;  Laterality: N/A;    PORTACATH PLACEMENT         Social History     Socioeconomic History    Marital status:      Spouse name:  Not on file    Number of children: Not on file    Years of education: Not on file    Highest education level: Not on file   Occupational History    Not on file   Social Needs    Financial resource strain: Not on file    Food insecurity:     Worry: Not on file     Inability: Not on file    Transportation needs:     Medical: Not on file     Non-medical: Not on file   Tobacco Use    Smoking status: Former Smoker     Types: Cigarettes     Last attempt to quit:      Years since quittin.9    Smokeless tobacco: Former User    Tobacco comment: quit     Substance and Sexual Activity    Alcohol use: Yes     Comment: one beer a month    Drug use: No    Sexual activity: Not on file     Comment:    Lifestyle    Physical activity:     Days per week: Not on file     Minutes per session: Not on file    Stress: Not on file   Relationships    Social connections:     Talks on phone: Not on file     Gets together: Not on file     Attends Mu-ism service: Not on file     Active member of club or organization: Not on file     Attends meetings of clubs or organizations: Not on file     Relationship status: Not on file   Other Topics Concern    Not on file   Social History Narrative    Not on file       Family History   Problem Relation Age of Onset    Diabetes Mother     Hypertension Mother     Cancer Father     Heart attack Father     Heart disease Paternal Grandmother        Physical Exam:    Vitals:    19 0532   BP: 135/67   Pulse: 77   Resp: 18   Temp:      Gen: No acute distress.  Mental Status:  Alert and oriented x 3.  Appropriate, conversant.  Skin: Warm, dry. No rashes seen.  Two wounds noted on the right neck.  The 1st incision, the larger 1, is clean dry and intact without any discharge present.  The 2nd surgical site/wound is approximately 3 cm, wound is dehisced.  There is no pus or other liquid leaking from the wound.  There is no surrounding erythema, crepitus, induration,  or tenderness to palpation of the surgical site.  ENT: Oropharynx clear.    Pulm: No increased work of breathing.  CV: Regular rate. Regular rhythm. Normal peripheral perfusion. No lower extremity edema.  Neuro: Awake. Speech normal. No focal neuro deficit observed.     Laboratory Studies:  Labs Reviewed   CBC W/ AUTO DIFFERENTIAL - Abnormal; Notable for the following components:       Result Value    RBC 3.60 (*)     Hemoglobin 11.2 (*)     Hematocrit 34.2 (*)     Mean Corpuscular Hemoglobin 31.1 (*)     MPV 9.0 (*)     Lymph # 0.8 (*)     Lymph% 13.2 (*)     All other components within normal limits   COMPREHENSIVE METABOLIC PANEL - Abnormal; Notable for the following components:    BUN, Bld 27 (*)     Creatinine 2.0 (*)     Total Bilirubin 1.4 (*)     eGFR if  40.2 (*)     eGFR if non  34.7 (*)     All other components within normal limits     Chart reviewed.  As summarized above, recent neck surgery for lymph node removal.    Imaging Results    None       Medications Given:  Medications - No data to display    Discussed with: ENT    MDM:    64 y.o. male with complaint of drainage from a surgical site and wound dehiscence, transferred from outside hospital for ENT evaluation.  The consulted has seen the patient in the department, they have expressed some wound from a seroma.  They placed a compressive dressing, do not recommend any additional interventions at this time.  Patient to follow up in clinic.  He is stable, appropriate for discharge at this time.      Diagnostic Impression:    1. Postoperative seroma of skin after non-dermatologic procedure        Patient and/or family understands the plan and is in agreement, verbalized understanding, questions answered    Eugenia Torres MD  Emergency Medicine           Eugenia Torres MD  11/26/19 0759

## 2019-11-25 NOTE — ED NOTES
Hourly rounding complete. Pt lying in stretcher in low and locked position, side rails raised x2, call light and pt belongings in reach. Pt on continuous  BP circulating every 30 minutes, and pulse oximetry. Vital signs stable and pt in NAD. Pt verbalized no needs at this time.

## 2019-11-25 NOTE — ED NOTES
Pt. Stated had surgery to Rt. Neck area stated started to shave and wound to neck opened up and started to drain.Pt. Alert and oriented WENDY breathe sounds clear and equal.

## 2019-11-25 NOTE — ASSESSMENT & PLAN NOTE
63 yo male with post op seroma. Vital signs stable. Labs unremarkable. Exam shows no evidence of infection. He appears clinically well.  -will place compressive dressing to site  -Patient can follow up with Dr. Romano in clinic

## 2019-11-25 NOTE — CONSULTS
[facility]  Otorhinolaryngology-Head & Neck Surgery  Consult Note    Patient Name: Jerrod Mendez  MRN: [id number]  Code Status: Prior  Admission Date: 11/25/2019  Hospital Length of Stay: 0 days  Attending Physician: Eugenia Torres MD  Primary Care Provider: Caroline Stapleton MD    Patient information was obtained from patient and ER records.     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 His prior drain incision site split open today and drained clear carmen fluid. He denies pain, fever, chills, nausea, vomiting, dyspnea, dysphagia, odynophagia. Feels well.    Medications:  Continuous Infusions:  Scheduled Meds:  PRN Meds:     No current facility-administered medications on file prior to encounter.      Current Outpatient Medications on File Prior to Encounter   Medication Sig    aspirin 81 MG Chew Take 81 mg by mouth once daily. Coated ASA    carvedilol (COREG) 6.25 MG tablet Take half tab a day for 1 week then take 1 tab daily thereafter (Patient taking differently: Take 3.125 mg by mouth 2 (two) times daily. Take half tab a day for 1 week then take 1 tab daily thereafter)    glipiZIDE (GLUCOTROL) 5 MG tablet Take 10 mg by mouth daily with breakfast.     HYDROcodone-acetaminophen (NORCO) 5-325 mg per tablet Take 1 tablet by mouth every 4 (four) hours as needed.    indomethacin (INDOCIN) 50 MG capsule indomethacin 50 mg capsule   TAKE ONE CAPSULE BY MOUTH 3 TIMES A DAY AS NEEDED FOR 5 DAYS    levothyroxine (SYNTHROID) 50 MCG tablet Take 50 mcg by mouth once daily.    nitroGLYCERIN (NITROSTAT) 0.3 MG SL tablet Place 0.3 mg under the tongue every 5  (five) minutes as needed for Chest pain.    ondansetron (ZOFRAN-ODT) 4 MG TbDL Dissolve 1 tablet (4 mg total) by mouth every 6 (six) hours as needed.    pioglitazone (ACTOS) 45 MG tablet Take 45 mg by mouth once daily.    simvastatin (ZOCOR) 40 MG tablet Take 40 mg by mouth every evening.       Review of patient's allergies indicates:  No Known Allergies    Past Medical History:   Diagnosis Date    Back pain     Brain aneurysm     Cancer     tonsils    Coronary artery disease     Diabetes mellitus     Gout     High cholesterol     Hypertension     Oropharyngeal dysphagia     JIM (obstructive sleep apnea)     Stroke     tia    TIA (transient ischemic attack)      Past Surgical History:   Procedure Laterality Date    CHOLECYSTECTOMY      CORONARY ANGIOPLASTY WITH STENT PLACEMENT      DISSECTION OF NECK Right 2019    Procedure: DISSECTION, NECK;  Surgeon: Stu Romano MD;  Location: [address];  Service: ENT;  Laterality: Right;    ESOPHAGOGASTRODUODENOSCOPY      FINE NEEDLE ASPIRATION Right     neck mass    GASTROSTOMY TUBE PLACEMENT      LIPOMA RESECTION      back    PEG TUBE REMOVAL N/A 2019    Procedure: REMOVAL, PEG TUBE;  Surgeon: Milton Friedman MD;  Location: [facility];  Service: Endoscopy;  Laterality: N/A;    PORTACATH PLACEMENT       Family History     Problem Relation (Age of Onset)    Cancer Father    Diabetes Mother    Heart attack Father    Heart disease Paternal Grandmother    Hypertension Mother        Tobacco Use    Smoking status: Former Smoker     Types: Cigarettes     Last attempt to quit:      Years since quittin.9    Smokeless tobacco: Former User    Tobacco comment: quit     Substance and Sexual Activity    Alcohol use: Yes     Comment: one beer a month    Drug use: No    Sexual activity: Not on file     Comment:      Review of Systems   Constitutional: Negative.    HENT: Negative.    Eyes: Negative.    Respiratory:  Negative.    Cardiovascular: Negative.    Gastrointestinal: Negative.    Endocrine: Negative.    Genitourinary: Negative.    Musculoskeletal: Negative.    Skin: Positive for wound.   Neurological: Negative.    Hematological: Negative.      Objective:     Vital Signs (Most Recent):  Temp: 97.6 °F (36.4 °C) (11/25/19 0259)  Pulse: 74 (11/25/19 0402)  Resp: 16 (11/25/19 0402)  BP: 114/60 (11/25/19 0402)  SpO2: 96 % (11/25/19 0402) Vital Signs (24h Range):  Temp:  [97.6 °F (36.4 °C)-97.9 °F (36.6 °C)] 97.6 °F (36.4 °C)  Pulse:  [69-80] 74  Resp:  [13-18] 16  SpO2:  [96 %-100 %] 96 %  BP: (114-146)/(60-93) 114/60     Weight: 113.4 kg (250 lb)  Body mass index is 34.87 kg/m².        Physical Exam  Physical Exam    Vitals:    11/25/19 0402   BP: 114/60   Pulse: 74   Resp: 16   Temp:      General: AAOx3, NAD.  Right Ear: External Auditory Canal WNL,TM w/o masses/lesions/perforations/effusions.  Left Ear:  External Auditory Canal WNL,TM w/o masses/lesions/perforations/effusions.  Nose: No gross nasal septal deviation.  Inferior Turbinates WNL bilaterally.  No septal perforation or hematomas  No masses/lesions.  Oral Cavity: FOM Soft, no masses palpated. Oral Tongue mobile. Hard Palate WNL.  Oropharynx: BOT WNL.  No masses/lesions noted. Tonsillar fossa without lesions. Soft palate without masses. Midline uvula.  Neck: Neck dissection incision is well healed. Prior drain incision is dehiscent. Probed with q-tip. There is a fluid collection inferior to the incision in the supraclavicular region. Additional fluid expressed with massage. No overlying skin changes or tenderness.  Face: HB I bilaterally. Normal sensation.  Eyes: EOM intact bilaterally, PERRLA bilaterally. No exophthalmos/enopthalmos.  Neuro: CN II-XII grossly intact    Significant Labs:  All pertinent labs from the last 24 hours have been reviewed.    Significant Diagnostics:  I have reviewed and interpreted all pertinent imaging results/findings within the past  24 hours.    Assessment/Plan:     Postprocedural seroma of a musculoskeletal structure following other procedure  65 yo male with post op seroma. Vital signs stable. Labs unremarkable. Exam shows no evidence of infection. He appears clinically well.  -will place compressive dressing to site  -Patient can follow up with Dr. Romano in clinic        VTE Risk Mitigation (From admission, onward)    None          Thank you for your consult.    Damián Garcia Jr., MD  Otorhinolaryngology-Head & Neck Surgery  [facility]

## 2019-11-25 NOTE — SUBJECTIVE & OBJECTIVE
Medications:  Continuous Infusions:  Scheduled Meds:  PRN Meds:     No current facility-administered medications on file prior to encounter.      Current Outpatient Medications on File Prior to Encounter   Medication Sig    aspirin 81 MG Chew Take 81 mg by mouth once daily. Coated ASA    carvedilol (COREG) 6.25 MG tablet Take half tab a day for 1 week then take 1 tab daily thereafter (Patient taking differently: Take 3.125 mg by mouth 2 (two) times daily. Take half tab a day for 1 week then take 1 tab daily thereafter)    glipiZIDE (GLUCOTROL) 5 MG tablet Take 10 mg by mouth daily with breakfast.     HYDROcodone-acetaminophen (NORCO) 5-325 mg per tablet Take 1 tablet by mouth every 4 (four) hours as needed.    indomethacin (INDOCIN) 50 MG capsule indomethacin 50 mg capsule   TAKE ONE CAPSULE BY MOUTH 3 TIMES A DAY AS NEEDED FOR 5 DAYS    levothyroxine (SYNTHROID) 50 MCG tablet Take 50 mcg by mouth once daily.    nitroGLYCERIN (NITROSTAT) 0.3 MG SL tablet Place 0.3 mg under the tongue every 5 (five) minutes as needed for Chest pain.    ondansetron (ZOFRAN-ODT) 4 MG TbDL Dissolve 1 tablet (4 mg total) by mouth every 6 (six) hours as needed.    pioglitazone (ACTOS) 45 MG tablet Take 45 mg by mouth once daily.    simvastatin (ZOCOR) 40 MG tablet Take 40 mg by mouth every evening.       Review of patient's allergies indicates:  No Known Allergies    Past Medical History:   Diagnosis Date    Back pain     Brain aneurysm     Cancer     tonsils    Coronary artery disease     Diabetes mellitus     Gout     High cholesterol     Hypertension     Oropharyngeal dysphagia     JIM (obstructive sleep apnea)     Stroke     tia    TIA (transient ischemic attack)      Past Surgical History:   Procedure Laterality Date    CHOLECYSTECTOMY      CORONARY ANGIOPLASTY WITH STENT PLACEMENT      DISSECTION OF NECK Right 11/6/2019    Procedure: DISSECTION, NECK;  Surgeon: Stu Romano MD;  Location: [facility];  Service: ENT;  Laterality: Right;    ESOPHAGOGASTRODUODENOSCOPY      FINE NEEDLE ASPIRATION Right     neck mass    GASTROSTOMY TUBE PLACEMENT      LIPOMA RESECTION      back    PEG TUBE REMOVAL N/A 2019    Procedure: REMOVAL, PEG TUBE;  Surgeon: Milton Friedman MD;  Location: [facility];  Service: Endoscopy;  Laterality: N/A;    PORTACATH PLACEMENT       Family History     Problem Relation (Age of Onset)    Cancer Father    Diabetes Mother    Heart attack Father    Heart disease Paternal Grandmother    Hypertension Mother        Tobacco Use    Smoking status: Former Smoker     Types: Cigarettes     Last attempt to quit:      Years since quittin.9    Smokeless tobacco: Former User    Tobacco comment: quit     Substance and Sexual Activity    Alcohol use: Yes     Comment: one beer a month    Drug use: No    Sexual activity: Not on file     Comment:      Review of Systems   Constitutional: Negative.    HENT: Negative.    Eyes: Negative.    Respiratory: Negative.    Cardiovascular: Negative.    Gastrointestinal: Negative.    Endocrine: Negative.    Genitourinary: Negative.    Musculoskeletal: Negative.    Skin: Positive for wound.   Neurological: Negative.    Hematological: Negative.      Objective:     Vital Signs (Most Recent):  Temp: 97.6 °F (36.4 °C) (19 0259)  Pulse: 74 (19)  Resp: 16 (19)  BP: 114/60 (19)  SpO2: 96 % (19) Vital Signs (24h Range):  Temp:  [97.6 °F (36.4 °C)-97.9 °F (36.6 °C)] 97.6 °F (36.4 °C)  Pulse:  [69-80] 74  Resp:  [13-18] 16  SpO2:  [96 %-100 %] 96 %  BP: (114-146)/(60-93) 114/60     Weight: 113.4 kg (250 lb)  Body mass index is 34.87 kg/m².        Physical Exam  Physical Exam    Vitals:    19   BP: 114/60   Pulse: 74   Resp: 16   Temp:      General: AAOx3, NAD.  Right Ear: External Auditory Canal WNL,TM w/o masses/lesions/perforations/effusions.  Left Ear:  External Auditory Canal  WNL,TM w/o masses/lesions/perforations/effusions.  Nose: No gross nasal septal deviation.  Inferior Turbinates WNL bilaterally.  No septal perforation or hematomas  No masses/lesions.  Oral Cavity: FOM Soft, no masses palpated. Oral Tongue mobile. Hard Palate WNL.  Oropharynx: BOT WNL.  No masses/lesions noted. Tonsillar fossa without lesions. Soft palate without masses. Midline uvula.  Neck: Neck dissection incision is well healed. Prior drain incision is dehiscent. Probed with q-tip. There is a fluid collection inferior to the incision in the supraclavicular region. Additional fluid expressed with massage. No overlying skin changes or tenderness.  Face: HB I bilaterally. Normal sensation.  Eyes: EOM intact bilaterally, PERRLA bilaterally. No exophthalmos/enopthalmos.  Neuro: CN II-XII grossly intact    Significant Labs:  All pertinent labs from the last 24 hours have been reviewed.    Significant Diagnostics:  I have reviewed and interpreted all pertinent imaging results/findings within the past 24 hours.

## 2019-11-25 NOTE — ASSESSMENT & PLAN NOTE
He has developed a seroma to his right neck post op. No evidence of infection. Wound packed with saline dampened guaze. He will slowly remove the guaze over the next week. He will RTC in 1 week for revaluation, sooner if needed.

## 2019-11-25 NOTE — HPI
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 His prior drain incision site split open today and drained clear carmen fluid. He denies pain, fever, chills, nausea, vomiting, dyspnea, dysphagia, odynophagia. Feels well.

## 2019-11-25 NOTE — PROGRESS NOTES
"Chief Complaint   Patient presents with    Post-op Evaluation        Tonsillar cancer    12/5/2018 Initial Diagnosis     Tonsillar cancer      10/8/2019 -  Chemotherapy     Treatment Summary   Plan Name: OP HEAD AND NECK PEMBROLIZUMAB FLUOROURACIL CARBOPLATIN Q3W  Treatment Goal: Control  Status: Active  Start Date: 10/21/2019 (Planned)  End Date: 10/4/2021 (Planned)  Provider: Serena Blount MD  Chemotherapy: CARBOplatin (PARAPLATIN) in sodium chloride 0.9% 250 mL chemo infusion,  (original dose ), Intravenous, Clinic/HOD 1 time, 0 of 6 cycles  Dose modification:   (Cycle 1)  fluorouracil (ADRUCIL) in sodium chloride 0.9% 240 mL chemo infusion, 1,000 mg/m2/day (original dose ), Intravenous, Over 96 hours, 0 of 6 cycles  Dose modification: 1,000 mg/m2/day (Cycle 1)  pembrolizumab (KEYTRUDA) 200 mg in sodium chloride 0.9% 100 mL chemo infusion, 200 mg, Intravenous, Clinic/HOD 1 time, 0 of 35 cycles      11/6/2019 Surgery     Modified neck dissection involving levels 2 through 5 with sacrifice of the spinal accessory nerve and ligation of the right internal jugular vein           HPI   64 y.o. male returns for a post op visit. He states that he developed pain and pressure to his right neck 2 days ago. Last night, his neck "opened" and began leaking straw colored drainage. He went to the ER and was discharged early this am with instructions to follow up in clinic today. He denies fever, chills, or purulent drainage. No other complaints.     Review of Systems   Constitutional: Negative for fatigue and unexpected weight change.   HENT: Per HPI.  Eyes: Negative for visual disturbance.   Respiratory: Negative for shortness of breath, hemoptysis   Cardiovascular: Negative for chest pain and palpitations.   Musculoskeletal: Negative for decreased ROM, back pain.   Skin: Negative for rash, sunburn, itching.   Neurological: Negative for dizziness and seizures.   Hematological: Negative for adenopathy. Does not bruise/bleed " easily.   Endocrine: Negative for rapid weight loss/weight gain, heat/cold intolerance.     Past Medical History   Patient Active Problem List   Diagnosis    Obstructive sleep apnea (adult) (pediatric)    TIA involving carotid artery    Essential hypertension    Type 2 diabetes mellitus with diabetic neuropathy, without long-term current use of insulin    CAD (coronary artery disease)    Hyperlipidemia    Neck mass    Anterior communicating artery aneurysm    Tonsillar cancer    Dysphagia, oropharyngeal    History of transient ischemic attack    Thoracic back sprain    Low back pain    Dysphagia    Hypertensive disorder    Hyperlipidemia    Sleep apnea    Intracranial aneurysm    Transient cerebral ischemia    Tonsillar mass    Gout    Encounter for education    CINV (chemotherapy-induced nausea and vomiting)    Dehydration, severe    Acute right-sided back pain    Pancytopenia    Elevated serum creatinine    Poor appetite    Oropharyngeal dysphagia    Fatigue    History of hematuria    Drug-induced membranous glomerulonephritis    Lipoma of skin    Obesity with body mass index 30 or greater    Onychomycosis    Sprain of rotator cuff capsule    Tinea corporis    Urolithiasis    CKD (chronic kidney disease) stage 3, GFR 30-59 ml/min    Flank pain    Supraclavicular lymphadenopathy    Coronary angioplasty status    Hypothyroid    Elevated bilirubin    Enlarged prostate    Edema    Secondary malignant neoplasm of cervical lymph node    Postprocedural seroma of a musculoskeletal structure following other procedure           Past Surgical History   Past Surgical History:   Procedure Laterality Date    CHOLECYSTECTOMY      CORONARY ANGIOPLASTY WITH STENT PLACEMENT      DISSECTION OF NECK Right 11/6/2019    Procedure: DISSECTION, NECK;  Surgeon: Stu Romano MD;  Location: [facility] OR [address] [place] [address];  Service: ENT;  Laterality: Right;    ESOPHAGOGASTRODUODENOSCOPY      FINE  NEEDLE ASPIRATION Right     neck mass    GASTROSTOMY TUBE PLACEMENT      LIPOMA RESECTION      back    PEG TUBE REMOVAL N/A 2019    Procedure: REMOVAL, PEG TUBE;  Surgeon: Milton Friedman MD;  Location: [facility];  Service: Endoscopy;  Laterality: N/A;    PORTACATH PLACEMENT           Family History   Family History   Problem Relation Age of Onset    Diabetes Mother     Hypertension Mother     Cancer Father     Heart attack Father     Heart disease Paternal Grandmother            Social History   .  Social History     Socioeconomic History    Marital status:      Spouse name: Not on file    Number of children: Not on file    Years of education: Not on file    Highest education level: Not on file   Occupational History    Not on file   Social Needs    Financial resource strain: Not on file    Food insecurity:     Worry: Not on file     Inability: Not on file    Transportation needs:     Medical: Not on file     Non-medical: Not on file   Tobacco Use    Smoking status: Former Smoker     Types: Cigarettes     Last attempt to quit:      Years since quittin.9    Smokeless tobacco: Former User    Tobacco comment: quit     Substance and Sexual Activity    Alcohol use: Yes     Comment: one beer a month    Drug use: No    Sexual activity: Not on file     Comment:    Lifestyle    Physical activity:     Days per week: Not on file     Minutes per session: Not on file    Stress: Not on file   Relationships    Social connections:     Talks on phone: Not on file     Gets together: Not on file     Attends Bahai service: Not on file     Active member of club or organization: Not on file     Attends meetings of clubs or organizations: Not on file     Relationship status: Not on file   Other Topics Concern    Not on file   Social History Narrative    Not on file         Allergies   Review of patient's allergies indicates:  No Known Allergies        Physical Exam      Vitals:    11/25/19 1054   BP: (!) 133/100   Pulse: 89         Body mass index is 35.48 kg/m².      General: AOx3, NAD   Respiratory:  Symmetric chest rise, normal effort  Neck: Right neck incisions CDI.  No redness, drainage, or fluid collection noted.  Edges well approximated.   Opening to lower right neck from previous DEMETRIO drain site with small amount of serous drainage. Tracks anteriorly and superficially. Packed with saline dampened guaze strip.  Face: House Brackmann I bilaterally.       FINAL PATHOLOGIC DIAGNOSIS  1. Tissue at right skull base, biopsy:  Invasive squamous cell carcinoma, nonkeratinizing with basaloid features.  2. Right neck, level 5, dissection:  Invasive squamous cell carcinoma, nonkeratinizing with basaloid features, involving fibroadipose tissues (5.0 cm  metastatic deposit).  One separate small lymph node negative for malignancy (0/1).  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 P16 is  strongly and diffusely positive in tumor cells, in keeping with HPV related etiology. No lymphoid tissue is identified  associated with the metastatic mass, although it may have originated from a completely replaced lymph node not  represented. Clinical correlation is advised.    Assessment/Plan  Problem List Items Addressed This Visit        Oncology    RESOLVED: Neoplasm of tonsil - Primary    Secondary malignant neoplasm of cervical lymph node    Tonsillar cancer     He has developed a seroma to his right neck post op. No evidence of infection. Wound packed with saline dampened guaze. He will slowly remove the guaze over the next week. He will RTC in 1 week for revaluation, sooner if needed.

## 2019-11-29 ENCOUNTER — TELEPHONE (OUTPATIENT)
Dept: OTOLARYNGOLOGY | Facility: CLINIC | Age: 64
End: 2019-11-29

## 2019-11-29 ENCOUNTER — OFFICE VISIT (OUTPATIENT)
Dept: OTOLARYNGOLOGY | Facility: CLINIC | Age: 64
End: 2019-11-29
Payer: COMMERCIAL

## 2019-11-29 VITALS
SYSTOLIC BLOOD PRESSURE: 125 MMHG | DIASTOLIC BLOOD PRESSURE: 70 MMHG | HEART RATE: 88 BPM | BODY MASS INDEX: 35.43 KG/M2 | WEIGHT: 254 LBS

## 2019-11-29 DIAGNOSIS — C09.9 TONSILLAR CANCER: Primary | ICD-10-CM

## 2019-11-29 LAB
GRAM STN SPEC: NORMAL
GRAM STN SPEC: NORMAL

## 2019-11-29 PROCEDURE — 87102 FUNGUS ISOLATION CULTURE: CPT

## 2019-11-29 PROCEDURE — 87075 CULTR BACTERIA EXCEPT BLOOD: CPT

## 2019-11-29 PROCEDURE — 99024 PR POST-OP FOLLOW-UP VISIT: ICD-10-PCS | Mod: S$GLB,,, | Performed by: OTOLARYNGOLOGY

## 2019-11-29 PROCEDURE — 87116 MYCOBACTERIA CULTURE: CPT

## 2019-11-29 PROCEDURE — 99999 PR PBB SHADOW E&M-EST. PATIENT-LVL III: CPT | Mod: PBBFAC,,, | Performed by: OTOLARYNGOLOGY

## 2019-11-29 PROCEDURE — 99999 PR PBB SHADOW E&M-EST. PATIENT-LVL III: ICD-10-PCS | Mod: PBBFAC,,, | Performed by: OTOLARYNGOLOGY

## 2019-11-29 PROCEDURE — 87205 SMEAR GRAM STAIN: CPT

## 2019-11-29 PROCEDURE — 87206 SMEAR FLUORESCENT/ACID STAI: CPT

## 2019-11-29 PROCEDURE — 99024 POSTOP FOLLOW-UP VISIT: CPT | Mod: S$GLB,,, | Performed by: OTOLARYNGOLOGY

## 2019-11-29 PROCEDURE — 87070 CULTURE OTHR SPECIMN AEROBIC: CPT

## 2019-11-29 NOTE — TELEPHONE ENCOUNTER
----- Message from Ольга Lima sent at 11/29/2019  9:18 AM CST -----  Contact: PTs Wife  Wife called regarding PT's incision area turning purple after drain busted and was removed    Callback: [phone]  
2016

## 2019-11-29 NOTE — PROGRESS NOTES
Chief Complaint   Patient presents with    Post-op Evaluation        Tonsillar cancer    12/5/2018 Initial Diagnosis     Tonsillar cancer      10/8/2019 -  Chemotherapy     Treatment Summary   Plan Name: OP HEAD AND NECK PEMBROLIZUMAB FLUOROURACIL CARBOPLATIN Q3W  Treatment Goal: Control  Status: Active  Start Date: 10/21/2019 (Planned)  End Date: 10/4/2021 (Planned)  Provider: Serena Blount MD  Chemotherapy: CARBOplatin (PARAPLATIN) in sodium chloride 0.9% 250 mL chemo infusion,  (original dose ), Intravenous, Clinic/HOD 1 time, 0 of 6 cycles  Dose modification:   (Cycle 1)  fluorouracil (ADRUCIL) in sodium chloride 0.9% 240 mL chemo infusion, 1,000 mg/m2/day (original dose ), Intravenous, Over 96 hours, 0 of 6 cycles  Dose modification: 1,000 mg/m2/day (Cycle 1)  pembrolizumab (KEYTRUDA) 200 mg in sodium chloride 0.9% 100 mL chemo infusion, 200 mg, Intravenous, Clinic/HOD 1 time, 0 of 35 cycles      11/6/2019 Surgery     Modified neck dissection involving levels 2 through 5 with sacrifice of the spinal accessory nerve and ligation of the right internal jugular vein           HPI   64 y.o. male returns for a post op visit. He was seen last week for drainage from his right neck wound.  Packing was initiated.  He reports these nodes worsening swelling and pressure in the right neck over the last 24 hr.  He is also concerned the overlying skin has a purplish hue.     Review of Systems   Constitutional: Negative for fatigue and unexpected weight change.   HENT: Per HPI.  Eyes: Negative for visual disturbance.   Respiratory: Negative for shortness of breath, hemoptysis   Cardiovascular: Negative for chest pain and palpitations.   Musculoskeletal: Negative for decreased ROM, back pain.   Skin: Negative for rash, sunburn, itching.   Neurological: Negative for dizziness and seizures.   Hematological: Negative for adenopathy. Does not bruise/bleed easily.   Endocrine: Negative for rapid weight loss/weight gain, heat/cold  intolerance.     Past Medical History   Patient Active Problem List   Diagnosis    Obstructive sleep apnea (adult) (pediatric)    TIA involving carotid artery    Essential hypertension    Type 2 diabetes mellitus with diabetic neuropathy, without long-term current use of insulin    CAD (coronary artery disease)    Hyperlipidemia    Neck mass    Anterior communicating artery aneurysm    Tonsillar cancer    Dysphagia, oropharyngeal    History of transient ischemic attack    Thoracic back sprain    Low back pain    Dysphagia    Hypertensive disorder    Hyperlipidemia    Sleep apnea    Intracranial aneurysm    Transient cerebral ischemia    Tonsillar mass    Gout    Encounter for education    CINV (chemotherapy-induced nausea and vomiting)    Dehydration, severe    Acute right-sided back pain    Pancytopenia    Elevated serum creatinine    Poor appetite    Oropharyngeal dysphagia    Fatigue    History of hematuria    Drug-induced membranous glomerulonephritis    Lipoma of skin    Obesity with body mass index 30 or greater    Onychomycosis    Sprain of rotator cuff capsule    Tinea corporis    Urolithiasis    CKD (chronic kidney disease) stage 3, GFR 30-59 ml/min    Flank pain    Supraclavicular lymphadenopathy    Coronary angioplasty status    Hypothyroid    Elevated bilirubin    Enlarged prostate    Edema    Secondary malignant neoplasm of cervical lymph node    Postprocedural seroma of a musculoskeletal structure following other procedure           Past Surgical History   Past Surgical History:   Procedure Laterality Date    CHOLECYSTECTOMY      CORONARY ANGIOPLASTY WITH STENT PLACEMENT      DISSECTION OF NECK Right 11/6/2019    Procedure: DISSECTION, NECK;  Surgeon: Stu Romano MD;  Location: [facility] OR [address] [place] [address];  Service: ENT;  Laterality: Right;    ESOPHAGOGASTRODUODENOSCOPY      FINE NEEDLE ASPIRATION Right     neck mass    GASTROSTOMY TUBE PLACEMENT       LIPOMA RESECTION      back    PEG TUBE REMOVAL N/A 2019    Procedure: REMOVAL, PEG TUBE;  Surgeon: Milton Friedman MD;  Location: [facility];  Service: Endoscopy;  Laterality: N/A;    PORTACATH PLACEMENT           Family History   Family History   Problem Relation Age of Onset    Diabetes Mother     Hypertension Mother     Cancer Father     Heart attack Father     Heart disease Paternal Grandmother            Social History   .  Social History     Socioeconomic History    Marital status:      Spouse name: Not on file    Number of children: Not on file    Years of education: Not on file    Highest education level: Not on file   Occupational History    Not on file   Social Needs    Financial resource strain: Not on file    Food insecurity:     Worry: Not on file     Inability: Not on file    Transportation needs:     Medical: Not on file     Non-medical: Not on file   Tobacco Use    Smoking status: Former Smoker     Types: Cigarettes     Last attempt to quit:      Years since quittin.9    Smokeless tobacco: Former User    Tobacco comment: quit     Substance and Sexual Activity    Alcohol use: Yes     Comment: one beer a month    Drug use: No    Sexual activity: Not on file     Comment:    Lifestyle    Physical activity:     Days per week: Not on file     Minutes per session: Not on file    Stress: Not on file   Relationships    Social connections:     Talks on phone: Not on file     Gets together: Not on file     Attends [employer] service: Not on file     Active member of club or organization: Not on file     Attends meetings of clubs or organizations: Not on file     Relationship status: Not on file   Other Topics Concern    Not on file   Social History Narrative    Not on file         Allergies   Review of patient's allergies indicates:  No Known Allergies        Physical Exam     Vitals:    19 1159   BP: 125/70   Pulse: 88         Body mass index is  35.43 kg/m².      General: AOx3, NAD   Respiratory:  Symmetric chest rise, normal effort  Neck: Right neck incisions CDI.  No redness, drainage, or fluid collection noted.  Edges well approximated.   Opening to lower right neck from previous DEMETRIO drain site with moderate amount of serous/cloudy drainage. Tracks anteriorly and superficially. Packed with saline dampened guaze strip.  The overlying skin has blanching erythema.  Face: House Brackmann I bilaterally.       Assessment/Plan  Problem List Items Addressed This Visit        Oncology    Tonsillar cancer - Primary     Draining postoperative neck wound secondary to seroma versus Chyle leak versus wound infection.  There is no nohemy evidence of wound infection today so I did not start antibiotics.  I did send a sample of the drainage for triglycerides and sent cultures (Gram stain, aerobic, anaerobic, fungal and acid-fast).    He will return next week for repeat evaluation.  He will contact us sooner if problems arise.         Relevant Orders    Triglyceride, Body Fluid (Reference Lab or Non-[facility]) Other (Specify)    Gram stain    Anaerobic culture    Aerobic culture    Fungus culture    AFB culture

## 2019-11-29 NOTE — ASSESSMENT & PLAN NOTE
Draining postoperative neck wound secondary to seroma versus Chyle leak versus wound infection.  There is no nohemy evidence of wound infection today so I did not start antibiotics.  I did send a sample of the drainage for triglycerides and sent cultures (Gram stain, aerobic, anaerobic, fungal and acid-fast).    He will return next week for repeat evaluation.  He will contact us sooner if problems arise.

## 2019-12-02 LAB — BACTERIA SPEC AEROBE CULT: NORMAL

## 2019-12-03 LAB — BACTERIA SPEC ANAEROBE CULT: NORMAL

## 2019-12-03 NOTE — PROGRESS NOTES
PROGRESS NOTE    Subjective:       Patient ID: Jerrod Mendez is a 64 y.o. male.    Chief Complaint: follow up for R tonsil cancer    Diagnosis:  1. 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 Recurrent disease to the right supraclavicular LN found on 10/4/2019.     Oncologic History:  1. Mr Mendez is a 65 yo man with locally advanced R tonsillar cancer s/p chemoradiation. He was previously [place] at [facility] [place]. He presented with a lump behind his right ear in Nov 2018. Laryngoscopy on 11/12/18 showed fullness in the right tonsil. CT neck on 11/13/2018 showed a 4.6 x 3.4 x 6.6 cm mass with areas of necrosis medial to the right parotid gland and extending inferior and posterior to the submandibular and posterior to the sternocleidomastoid compressing the jugular vein. Prominent adjacent posterior jugular nodes on the right 1.8 cm. Prominence of the soft tissue which showed asmymetric in the region of the right oropharynx without a discrete mass. Compression of the jugular vein and internal carotid artery by the mass. Prominent left posterior jugular node 1.4 cm. US guided FNA on 11/19/2018 showed squamous cell carcinoma, unable to test HPV. He was at the ER on 11/22/18 for trouble speaking. MRA brain/neck and MRI brain was unremarkable. It was felt that he had a TIA. PET scan on 12/10/18 showed Hypermetabolic right tonsillar mass with multiple metastatic right cervical lymph nodes, and single metastatic left cervical node.  No definite evidence of metastatic disease within the chest, abdomen or pelvis. Punctate nodular densities within the right upper lobe, questionable 4 mm cavitary nodule.    2. He was treated with concurrent chemoradiation with high dose cisplatin 100 mg/m2. He received two doses of cisplatin. Did not get dose 3 because of kidney failure. Completed radiation in Feb 2019.   3. Surveillance CT scan on 10/4/19 showed R  "supraclavicular lymphadenopathy which is new. That is when I saw him the first time in the clinic. We discussed that this is recurrent/stage IV tonsil cancer. Discussed with Dr Romano who feels the supraclavicular LAD is resectable. RT was deemed not an option by Dr Rodriguez. He was referred to Dr oRmano. He underwent Modified neck dissection involving levels 2 through 5 with sacrifice of the spinal accessory nerve and ligation of the right internal jugular vein on 11/6/2019. Post-op note: "Superiorly (of the internal jugular vein), at the level of the skull base there was noted to be tissue in this region worrisome for viable tumor.  This was sampled and sent to pathology and confirmed to be consistent with squamous cell carcinoma.  Given the location of this tumor, affected breech the deep cervical fascia and the need to resect the carotid artery and vagus nerve in order to clear it, we had cleared his disease unresectable."  4. Pathology: 1) Tissue at right skull base, biopsy: Invasive squamous cell carcinoma, nonkeratinizing with basaloid features. 2) Right neck, level 5, dissection: Invasive squamous cell carcinoma, nonkeratinizing with basaloid features, involving fibroadipose tissues (5.0 cm metastatic deposit). One separate small lymph node negative for malignancy (0/1).  5. Patient had draining postop neck wound 2/2 seroma vs Chyle leak. Was seen by Dr Romano.     Interval History:   Mr Mendez returns for follow up. I last saw him at [facility] for recurrent tonsil cancer to R supraclavicular lymph node. I referred him to Dr Romano. He underwent a Modified neck dissection involving levels 2 through 5 with sacrifice of the spinal accessory nerve and ligation of the right internal jugular vein on 11/6/2019. Intra-operatively, he was found to have tissue superiorly of the internal jugular vein, at the level of the skull base worrisome for viable tumor. This is confirmed by biopsy. His R neck level 5 LN was removed and " showed invasive squamous cell carcinoma. He had draining postop neck wound and is being followed by Dr Romano. Per wife the wound is healing and getting much smaller. He feels good. Denies other complaints.    ECO    ROS:   A ten-point system review is obtained and negative except for what was stated in the Interval History.     Physical Examination:   Vital signs reviewed.   General: well hydrated, well developed, in no acute distress  HEENT: normocephalic, PERRLA, EOMI, anicteric sclerae, oropharynx clear  Neck: supple, no JVD, thyromegaly. No cervical or supraclavicular LAD. Right neck small open wound, no signs of infection.   Lungs: clear breath sounds bilaterally, no wheezing, rales, or rhonchi  Heart: RRR, no M/R/G  Abdomen: soft, no tenderness, non-distended, no hepatosplenomegaly, mass, or hernia. BS present  Extremities: no clubbing, cyanosis, or edema  Skin: no rash, ulcer, or open wounds  Neuro: alert and oriented x 4, no focal neuro deficit  Psych: pleasant and appropriate mood and affect    Objective:     Laboratory Data:  Labs reviewed. TSH elevated, free T4 normal      Assessment and Plan:     1. Tonsillar cancer    2. Malignant neoplasm metastatic to lymph nodes of multiple sites    3. Anorexia    4. Subclinical hypothyroidism    5. Type 2 diabetes mellitus with diabetic neuropathy, without long-term current use of insulin    6. Essential hypertension    7. Coronary artery disease involving native coronary artery of native heart without angina pectoris    8. CKD (chronic kidney disease) stage 3, GFR 30-59 ml/min      1.2  - Mr Mendez is a 65 yo man with originally stage IVB (TxN3) R tonsil cancer, diagnosed on 2018, s/p chemoradiation with high dose cisplatin completed in 2019. He developed recurrence in R supraclavicular LN in Oct 2019. Underwent a modified neck dissection involving levels 2 through 5 with sacrifice of the spinal accessory nerve and ligation of the right internal  jugular vein on 11/6/2019 by Dr Romano. Intra-operatively, he was found to have unresectable tissue near the internal jugular vein at the level of the skull base, which biopsy-proven to be squamous cell carcinoma.   - Long discussion with patient and wife regarding operative and pathology findings. Unfortunately he was found to have unresectable cancer intraoperatively. His R supraclavicular LN was resected. We discussed that given the new finding of unresectable disease, his cancer is not curable. Treatment is of a palliative intent, with goals of containing the tumor, slowing down the growth, and prolonging life  - send tissue to [facility]  - discussed with patient that his wound is not healed yet. I plan to see him back in 3 weeks with a restaging PET scan at that time. If PET scan is negative, we can continue to monitor. If PET scan shows recurrent disease, we can start palliative chemoimmunotherapy with carbo/5FU/keytruda. They understand and agree with the plan.   - The side effects of carbo/5FU were discussed with patient, which include but are not limited to hair loss, fatigue, decreased appetite, weight loss, weakness, bone marrow suppression, increased risk of infection, sepsis, anemia that may require blood transfusion, low platelet counts with increased risk of bleeding that may require platelet transfusion, diarrhea, constipation, mucositis, damage to the brain, heart, liver, kidney, damage to the nerves that may not be reversible, severe allergic reaction, damage at the injection site, sterility, secondary cancer, death.   - The side effects of keytruda were discussed with patient, which include but are not limited to fatigue, weakness, decreased appetite, nausea, vomiting, diarrhea, skin reactions and rashes which can be severe, flu-like symptoms, fevers, infusion reactions, pain at the site of infusion, inflammation of organs including stomach, bowels, liver, brain, nervous system, lungs, liver, kidney,  thyroid, and death.    - handouts of the three medications were given to patient and wife the last time. They still have it  - RTC in 3 weeks with PET scan    3.  - 2/2 cancer  - dronabinol prescribed    4.  - monitor free T4 monthly    5.  - BS good  - c/w current meds    6.  - BP good  - c/w current meds    7.  - s/p stent  - c/w aspirin    8.  - from cisplatin  - baseline Cr 2. monitor      Follow-up:     RTC in 3 weeks with PET scan  Knows to call in the interval if any problems arise.    Electronically signed by Serena Blount

## 2019-12-04 ENCOUNTER — OFFICE VISIT (OUTPATIENT)
Dept: OTOLARYNGOLOGY | Facility: CLINIC | Age: 64
End: 2019-12-04
Payer: COMMERCIAL

## 2019-12-04 ENCOUNTER — OFFICE VISIT (OUTPATIENT)
Dept: HEMATOLOGY/ONCOLOGY | Facility: CLINIC | Age: 64
End: 2019-12-04
Payer: COMMERCIAL

## 2019-12-04 ENCOUNTER — LAB VISIT (OUTPATIENT)
Dept: LAB | Facility: HOSPITAL | Age: 64
End: 2019-12-04
Payer: COMMERCIAL

## 2019-12-04 VITALS
HEIGHT: 71 IN | RESPIRATION RATE: 16 BRPM | BODY MASS INDEX: 35.95 KG/M2 | DIASTOLIC BLOOD PRESSURE: 81 MMHG | WEIGHT: 256.81 LBS | TEMPERATURE: 98 F | HEART RATE: 74 BPM | SYSTOLIC BLOOD PRESSURE: 138 MMHG | OXYGEN SATURATION: 100 %

## 2019-12-04 VITALS
WEIGHT: 258.19 LBS | TEMPERATURE: 98 F | BODY MASS INDEX: 36.01 KG/M2 | SYSTOLIC BLOOD PRESSURE: 106 MMHG | DIASTOLIC BLOOD PRESSURE: 72 MMHG | HEART RATE: 87 BPM

## 2019-12-04 DIAGNOSIS — J35.8 TONSILLAR MASS: ICD-10-CM

## 2019-12-04 DIAGNOSIS — N18.30 CKD (CHRONIC KIDNEY DISEASE) STAGE 3, GFR 30-59 ML/MIN: ICD-10-CM

## 2019-12-04 DIAGNOSIS — C09.9 TONSILLAR CANCER: ICD-10-CM

## 2019-12-04 DIAGNOSIS — C09.9 TONSILLAR CANCER: Primary | ICD-10-CM

## 2019-12-04 DIAGNOSIS — C77.0 SECONDARY MALIGNANT NEOPLASM OF CERVICAL LYMPH NODE: Primary | ICD-10-CM

## 2019-12-04 DIAGNOSIS — R11.2 CINV (CHEMOTHERAPY-INDUCED NAUSEA AND VOMITING): ICD-10-CM

## 2019-12-04 DIAGNOSIS — I25.10 CORONARY ARTERY DISEASE INVOLVING NATIVE CORONARY ARTERY OF NATIVE HEART WITHOUT ANGINA PECTORIS: ICD-10-CM

## 2019-12-04 DIAGNOSIS — C77.8 MALIGNANT NEOPLASM METASTATIC TO LYMPH NODES OF MULTIPLE SITES: ICD-10-CM

## 2019-12-04 DIAGNOSIS — E03.8 SUBCLINICAL HYPOTHYROIDISM: ICD-10-CM

## 2019-12-04 DIAGNOSIS — R13.12 DYSPHAGIA, OROPHARYNGEAL: ICD-10-CM

## 2019-12-04 DIAGNOSIS — R63.0 ANOREXIA: ICD-10-CM

## 2019-12-04 DIAGNOSIS — E11.40 TYPE 2 DIABETES MELLITUS WITH DIABETIC NEUROPATHY, WITHOUT LONG-TERM CURRENT USE OF INSULIN: ICD-10-CM

## 2019-12-04 DIAGNOSIS — E86.0 DEHYDRATION, SEVERE: ICD-10-CM

## 2019-12-04 DIAGNOSIS — I10 ESSENTIAL HYPERTENSION: ICD-10-CM

## 2019-12-04 DIAGNOSIS — T45.1X5A CINV (CHEMOTHERAPY-INDUCED NAUSEA AND VOMITING): ICD-10-CM

## 2019-12-04 DIAGNOSIS — R22.1 LOCALIZED SWELLING, MASS OR LUMP OF NECK: ICD-10-CM

## 2019-12-04 LAB
ALBUMIN SERPL BCP-MCNC: 3.6 G/DL (ref 3.5–5.2)
ALP SERPL-CCNC: 81 U/L (ref 55–135)
ALT SERPL W/O P-5'-P-CCNC: 12 U/L (ref 10–44)
ANION GAP SERPL CALC-SCNC: 8 MMOL/L (ref 8–16)
AST SERPL-CCNC: 11 U/L (ref 10–40)
BASOPHILS # BLD AUTO: 0.03 K/UL (ref 0–0.2)
BASOPHILS NFR BLD: 0.4 % (ref 0–1.9)
BILIRUB SERPL-MCNC: 1.2 MG/DL (ref 0.1–1)
BUN SERPL-MCNC: 24 MG/DL (ref 8–23)
CALCIUM SERPL-MCNC: 10 MG/DL (ref 8.7–10.5)
CHLORIDE SERPL-SCNC: 107 MMOL/L (ref 95–110)
CO2 SERPL-SCNC: 27 MMOL/L (ref 23–29)
CREAT SERPL-MCNC: 2 MG/DL (ref 0.5–1.4)
CREAT SERPL-MCNC: 2 MG/DL (ref 0.5–1.4)
DIFFERENTIAL METHOD: ABNORMAL
EOSINOPHIL # BLD AUTO: 0.5 K/UL (ref 0–0.5)
EOSINOPHIL NFR BLD: 7.4 % (ref 0–8)
ERYTHROCYTE [DISTWIDTH] IN BLOOD BY AUTOMATED COUNT: 12.6 % (ref 11.5–14.5)
EST. GFR  (AFRICAN AMERICAN): 40.2 ML/MIN/1.73 M^2
EST. GFR  (AFRICAN AMERICAN): 40.2 ML/MIN/1.73 M^2
EST. GFR  (NON AFRICAN AMERICAN): 34.7 ML/MIN/1.73 M^2
EST. GFR  (NON AFRICAN AMERICAN): 34.7 ML/MIN/1.73 M^2
GLUCOSE SERPL-MCNC: 144 MG/DL (ref 70–110)
HCT VFR BLD AUTO: 35.9 % (ref 40–54)
HGB BLD-MCNC: 11.1 G/DL (ref 14–18)
IMM GRANULOCYTES # BLD AUTO: 0.04 K/UL (ref 0–0.04)
IMM GRANULOCYTES NFR BLD AUTO: 0.6 % (ref 0–0.5)
LYMPHOCYTES # BLD AUTO: 0.7 K/UL (ref 1–4.8)
LYMPHOCYTES NFR BLD: 10.5 % (ref 18–48)
MAGNESIUM SERPL-MCNC: 1.8 MG/DL (ref 1.6–2.6)
MCH RBC QN AUTO: 30.2 PG (ref 27–31)
MCHC RBC AUTO-ENTMCNC: 30.9 G/DL (ref 32–36)
MCV RBC AUTO: 98 FL (ref 82–98)
MONOCYTES # BLD AUTO: 0.6 K/UL (ref 0.3–1)
MONOCYTES NFR BLD: 9.3 % (ref 4–15)
NEUTROPHILS # BLD AUTO: 4.9 K/UL (ref 1.8–7.7)
NEUTROPHILS NFR BLD: 71.8 % (ref 38–73)
NRBC BLD-RTO: 0 /100 WBC
PLATELET # BLD AUTO: 256 K/UL (ref 150–350)
PMV BLD AUTO: 8.7 FL (ref 9.2–12.9)
POTASSIUM SERPL-SCNC: 4.9 MMOL/L (ref 3.5–5.1)
PROT SERPL-MCNC: 7.1 G/DL (ref 6–8.4)
RBC # BLD AUTO: 3.68 M/UL (ref 4.6–6.2)
SODIUM SERPL-SCNC: 142 MMOL/L (ref 136–145)
T4 FREE SERPL-MCNC: 1.09 NG/DL (ref 0.71–1.51)
TSH SERPL DL<=0.005 MIU/L-ACNC: 4.83 UIU/ML (ref 0.4–4)
WBC # BLD AUTO: 6.85 K/UL (ref 3.9–12.7)

## 2019-12-04 PROCEDURE — 80053 COMPREHEN METABOLIC PANEL: CPT

## 2019-12-04 PROCEDURE — 83735 ASSAY OF MAGNESIUM: CPT

## 2019-12-04 PROCEDURE — 3044F HG A1C LEVEL LT 7.0%: CPT | Mod: CPTII,S$GLB,, | Performed by: INTERNAL MEDICINE

## 2019-12-04 PROCEDURE — 3008F PR BODY MASS INDEX (BMI) DOCUMENTED: ICD-10-PCS | Mod: CPTII,S$GLB,, | Performed by: INTERNAL MEDICINE

## 2019-12-04 PROCEDURE — 99024 POSTOP FOLLOW-UP VISIT: CPT | Mod: S$GLB,,, | Performed by: NURSE PRACTITIONER

## 2019-12-04 PROCEDURE — 3044F PR MOST RECENT HEMOGLOBIN A1C LEVEL <7.0%: ICD-10-PCS | Mod: CPTII,S$GLB,, | Performed by: INTERNAL MEDICINE

## 2019-12-04 PROCEDURE — 99999 PR PBB SHADOW E&M-EST. PATIENT-LVL IV: CPT | Mod: PBBFAC,,, | Performed by: NURSE PRACTITIONER

## 2019-12-04 PROCEDURE — 3008F BODY MASS INDEX DOCD: CPT | Mod: CPTII,S$GLB,, | Performed by: INTERNAL MEDICINE

## 2019-12-04 PROCEDURE — 3079F DIAST BP 80-89 MM HG: CPT | Mod: CPTII,S$GLB,, | Performed by: INTERNAL MEDICINE

## 2019-12-04 PROCEDURE — 3075F PR MOST RECENT SYSTOLIC BLOOD PRESS GE 130-139MM HG: ICD-10-PCS | Mod: CPTII,S$GLB,, | Performed by: INTERNAL MEDICINE

## 2019-12-04 PROCEDURE — 84443 ASSAY THYROID STIM HORMONE: CPT

## 2019-12-04 PROCEDURE — 99215 PR OFFICE/OUTPT VISIT, EST, LEVL V, 40-54 MIN: ICD-10-PCS | Mod: S$GLB,,, | Performed by: INTERNAL MEDICINE

## 2019-12-04 PROCEDURE — 85025 COMPLETE CBC W/AUTO DIFF WBC: CPT

## 2019-12-04 PROCEDURE — 99999 PR PBB SHADOW E&M-EST. PATIENT-LVL IV: ICD-10-PCS | Mod: PBBFAC,,, | Performed by: NURSE PRACTITIONER

## 2019-12-04 PROCEDURE — 99024 PR POST-OP FOLLOW-UP VISIT: ICD-10-PCS | Mod: S$GLB,,, | Performed by: NURSE PRACTITIONER

## 2019-12-04 PROCEDURE — 99215 OFFICE O/P EST HI 40 MIN: CPT | Mod: S$GLB,,, | Performed by: INTERNAL MEDICINE

## 2019-12-04 PROCEDURE — 99999 PR PBB SHADOW E&M-EST. PATIENT-LVL III: ICD-10-PCS | Mod: PBBFAC,,, | Performed by: INTERNAL MEDICINE

## 2019-12-04 PROCEDURE — 3075F SYST BP GE 130 - 139MM HG: CPT | Mod: CPTII,S$GLB,, | Performed by: INTERNAL MEDICINE

## 2019-12-04 PROCEDURE — 36415 COLL VENOUS BLD VENIPUNCTURE: CPT

## 2019-12-04 PROCEDURE — 3079F PR MOST RECENT DIASTOLIC BLOOD PRESSURE 80-89 MM HG: ICD-10-PCS | Mod: CPTII,S$GLB,, | Performed by: INTERNAL MEDICINE

## 2019-12-04 PROCEDURE — 84439 ASSAY OF FREE THYROXINE: CPT

## 2019-12-04 PROCEDURE — 99999 PR PBB SHADOW E&M-EST. PATIENT-LVL III: CPT | Mod: PBBFAC,,, | Performed by: INTERNAL MEDICINE

## 2019-12-04 RX ORDER — LINEZOLID 600 MG/1
TABLET, FILM COATED ORAL
Status: ON HOLD | COMMUNITY
End: 2020-01-13 | Stop reason: HOSPADM

## 2019-12-04 RX ORDER — PROMETHAZINE HYDROCHLORIDE 25 MG/1
25 TABLET ORAL EVERY 6 HOURS PRN
Qty: 60 TABLET | Refills: 2 | Status: SHIPPED | OUTPATIENT
Start: 2019-12-04 | End: 2020-01-01

## 2019-12-04 RX ORDER — ONDANSETRON 4 MG/1
4 TABLET, ORALLY DISINTEGRATING ORAL EVERY 6 HOURS PRN
Qty: 60 TABLET | Refills: 0 | Status: SHIPPED | OUTPATIENT
Start: 2019-12-04

## 2019-12-04 RX ORDER — DRONABINOL 2.5 MG/1
2.5 CAPSULE ORAL
Qty: 60 CAPSULE | Refills: 0 | Status: SHIPPED | OUTPATIENT
Start: 2019-12-04 | End: 2021-01-01

## 2019-12-04 RX ORDER — LIDOCAINE AND PRILOCAINE 25; 25 MG/G; MG/G
CREAM TOPICAL ONCE
Qty: 30 G | Refills: 2 | Status: SHIPPED | OUTPATIENT
Start: 2019-12-04 | End: 2019-12-04

## 2019-12-04 NOTE — PROGRESS NOTES
Chief Complaint   Patient presents with    Post-op Evaluation        Tonsillar cancer    12/5/2018 Initial Diagnosis     Tonsillar cancer      10/8/2019 -  Chemotherapy     Treatment Summary   Plan Name: OP HEAD AND NECK PEMBROLIZUMAB FLUOROURACIL CARBOPLATIN Q3W  Treatment Goal: Control  Status: Active  Start Date: 10/21/2019 (Planned)  End Date: 10/4/2021 (Planned)  Provider: Serena Blount MD  Chemotherapy: CARBOplatin (PARAPLATIN) in sodium chloride 0.9% 250 mL chemo infusion,  (original dose ), Intravenous, Clinic/HOD 1 time, 0 of 6 cycles  Dose modification:   (Cycle 1)  fluorouracil (ADRUCIL) in sodium chloride 0.9% 240 mL chemo infusion, 1,000 mg/m2/day (original dose ), Intravenous, Over 96 hours, 0 of 6 cycles  Dose modification: 1,000 mg/m2/day (Cycle 1)  pembrolizumab (KEYTRUDA) 200 mg in sodium chloride 0.9% 100 mL chemo infusion, 200 mg, Intravenous, Clinic/HOD 1 time, 0 of 35 cycles      11/6/2019 Surgery     Modified neck dissection involving levels 2 through 5 with sacrifice of the spinal accessory nerve and ligation of the right internal jugular vein           HPI   64 y.o. male returns for a post op visit. He feels his neck is improving. There is a small amount of serous drainage from the wound. No complaints.    Review of Systems   Constitutional: Negative for fatigue and unexpected weight change.   HENT: Per HPI.  Eyes: Negative for visual disturbance.   Respiratory: Negative for shortness of breath, hemoptysis   Cardiovascular: Negative for chest pain and palpitations.   Musculoskeletal: Negative for decreased ROM, back pain.   Skin: Negative for rash, sunburn, itching.   Neurological: Negative for dizziness and seizures.   Hematological: Negative for adenopathy. Does not bruise/bleed easily.   Endocrine: Negative for rapid weight loss/weight gain, heat/cold intolerance.     Past Medical History   Patient Active Problem List   Diagnosis    Obstructive sleep apnea (adult) (pediatric)    TIA  involving carotid artery    Essential hypertension    Type 2 diabetes mellitus with diabetic neuropathy, without long-term current use of insulin    CAD (coronary artery disease)    Hyperlipidemia    Neck mass    Anterior communicating artery aneurysm    Tonsillar cancer    Dysphagia, oropharyngeal    History of transient ischemic attack    Thoracic back sprain    Low back pain    Dysphagia    Hypertensive disorder    Hyperlipidemia    Sleep apnea    Intracranial aneurysm    Transient cerebral ischemia    Tonsillar mass    Gout    Encounter for education    CINV (chemotherapy-induced nausea and vomiting)    Dehydration, severe    Acute right-sided back pain    Pancytopenia    Elevated serum creatinine    Poor appetite    Oropharyngeal dysphagia    Fatigue    History of hematuria    Drug-induced membranous glomerulonephritis    Lipoma of skin    Obesity with body mass index 30 or greater    Onychomycosis    Sprain of rotator cuff capsule    Tinea corporis    Urolithiasis    CKD (chronic kidney disease) stage 3, GFR 30-59 ml/min    Flank pain    Supraclavicular lymphadenopathy    Coronary angioplasty status    Hypothyroid    Elevated bilirubin    Enlarged prostate    Edema    Secondary malignant neoplasm of cervical lymph node    Postprocedural seroma of a musculoskeletal structure following other procedure           Past Surgical History   Past Surgical History:   Procedure Laterality Date    CHOLECYSTECTOMY      CORONARY ANGIOPLASTY WITH STENT PLACEMENT      DISSECTION OF NECK Right 11/6/2019    Procedure: DISSECTION, NECK;  Surgeon: Stu Romano MD;  Location: [facility] OR [address] [place] [address];  Service: ENT;  Laterality: Right;    ESOPHAGOGASTRODUODENOSCOPY      FINE NEEDLE ASPIRATION Right     neck mass    GASTROSTOMY TUBE PLACEMENT      LIPOMA RESECTION      back    PEG TUBE REMOVAL N/A 5/28/2019    Procedure: REMOVAL, PEG TUBE;  Surgeon: Milton Friedman MD;   Location: [facility];  Service: Endoscopy;  Laterality: N/A;    PORTACATH PLACEMENT           Family History   Family History   Problem Relation Age of Onset    Diabetes Mother     Hypertension Mother     Cancer Father     Heart attack Father     Heart disease Paternal Grandmother            Social History   .  Social History     Socioeconomic History    Marital status:      Spouse name: Not on file    Number of children: Not on file    Years of education: Not on file    Highest education level: Not on file   Occupational History    Not on file   Social Needs    Financial resource strain: Not on file    Food insecurity:     Worry: Not on file     Inability: Not on file    Transportation needs:     Medical: Not on file     Non-medical: Not on file   Tobacco Use    Smoking status: Former Smoker     Types: Cigarettes     Last attempt to quit:      Years since quittin.9    Smokeless tobacco: Former User    Tobacco comment: quit     Substance and Sexual Activity    Alcohol use: Yes     Comment: one beer a month    Drug use: No    Sexual activity: Not on file     Comment:    Lifestyle    Physical activity:     Days per week: Not on file     Minutes per session: Not on file    Stress: Not on file   Relationships    Social connections:     Talks on phone: Not on file     Gets together: Not on file     Attends [employer] service: Not on file     Active member of club or organization: Not on file     Attends meetings of clubs or organizations: Not on file     Relationship status: Not on file   Other Topics Concern    Not on file   Social History Narrative    Not on file         Allergies   Review of patient's allergies indicates:  No Known Allergies        Physical Exam     Vitals:    19 1304   BP: 106/72   Pulse: 87   Temp: 97.6 °F (36.4 °C)         Body mass index is 36.01 kg/m².      General: AOx3, NAD   Respiratory:  Symmetric chest rise, normal effort  Neck: Right  neck incisions CDI.  No redness, drainage, or fluid collection noted.  Edges well approximated.   Opening to lower right neck from previous DEMETRIO drain site has nearly closed, no appreciable depth or purulent drainage. Erythema has resolved.  Face: House Brackmann I bilaterally.       Assessment/Plan  Problem List Items Addressed This Visit        Oncology    Secondary malignant neoplasm of cervical lymph node - Primary    Tonsillar cancer     Wound is improving. RTC in 2 weeks, sooner if needed.

## 2019-12-04 NOTE — Clinical Note
Verify with PA re carbo/5FU/Keytruda. Get PET scan on 12/23. See me on 12/24. Return to [facility] on 12/30 with CBC, CMP, free T4, then get carbo/5FU/keytruda, return on 1/3 to remove pump

## 2019-12-18 ENCOUNTER — TELEPHONE (OUTPATIENT)
Dept: HEMATOLOGY/ONCOLOGY | Facility: CLINIC | Age: 64
End: 2019-12-18

## 2019-12-18 NOTE — TELEPHONE ENCOUNTER
Returned call and spoke with [facility] pharmacy. Calling to request the contact number to Mr Mendez insurance provider for pharmacy benefits. A prior auth is needed for his EMLA cream. Contact number given:[phone]. Member's ID# [id number]

## 2019-12-18 NOTE — TELEPHONE ENCOUNTER
----- Message from Yoon Espino sent at 12/18/2019  1:22 PM CST -----  Contact: Ruth Ann ( [facility] )  Contact: Ruth Ann ( [facility] )    What is the request in detail:   Prior Authorization for  lidocaine-prilocaine (EMLA) cream     Can the clinic reply by [facility]:   No      What Number to Call Back if not in [facility]:  [phone]

## 2019-12-19 ENCOUNTER — DOCUMENTATION ONLY (OUTPATIENT)
Dept: HEMATOLOGY/ONCOLOGY | Facility: CLINIC | Age: 64
End: 2019-12-19

## 2019-12-19 NOTE — TELEPHONE ENCOUNTER
Returned call and spoke with Mrs Mendez. Mr Mendez was asleep. Mrs Mendez calling to request lodging at the [facility]. Informed Mrs Mendez, I will forward a message to our JUAN Rodriguez. Jennifer will contact her to further discuss the dates that reservations are needed.

## 2019-12-19 NOTE — PROGRESS NOTES
Received referral from the clinic that the patient was in need of [facility] referral. Spoke with wife requesting check in date of 12/22 and check out of 12/24. Also needs check in date of 12/29 and check out of 12/31. Referrals faxed to the [facility]. Let her know that the [facility] would contact her directly.

## 2019-12-20 ENCOUNTER — TELEPHONE (OUTPATIENT)
Dept: HEMATOLOGY/ONCOLOGY | Facility: CLINIC | Age: 64
End: 2019-12-20

## 2019-12-20 NOTE — TELEPHONE ENCOUNTER
Returned call and spoke with Ms Mendez. Ms Mendez states she spoke with JUAN Rodriguez and requested reservations to the [facility] for she and Mr Mendez, checking in on [place] Dec. 22nd. Ms Mendez asking what time should arrive for check in? Jennifer informed Ms Mendez, she will receive a call from [facility] staff and they will provide her with the times for  check in and check out on Dec 23 rd. I provide Ms Mendez with the contact number for the [facility]. Informed her to give them a call and they will provide her with the needed information.

## 2019-12-20 NOTE — TELEPHONE ENCOUNTER
----- Message from Darek Bland sent at 12/20/2019 12:09 PM CST -----  Contact: Pt wife    Hi I have pt Jerrod Mendez wife on the line      Pt calling about [facility] information    Pt states no one has returned her call    Please contact pt wife at this # [phone]

## 2019-12-23 ENCOUNTER — HOSPITAL ENCOUNTER (OUTPATIENT)
Dept: RADIOLOGY | Facility: HOSPITAL | Age: 64
Discharge: HOME | End: 2019-12-23
Attending: INTERNAL MEDICINE
Payer: COMMERCIAL

## 2019-12-23 ENCOUNTER — OFFICE VISIT (OUTPATIENT)
Dept: OTOLARYNGOLOGY | Facility: CLINIC | Age: 64
End: 2019-12-23
Payer: COMMERCIAL

## 2019-12-23 VITALS
WEIGHT: 260.56 LBS | HEART RATE: 83 BPM | BODY MASS INDEX: 36.34 KG/M2 | SYSTOLIC BLOOD PRESSURE: 109 MMHG | DIASTOLIC BLOOD PRESSURE: 71 MMHG | TEMPERATURE: 98 F

## 2019-12-23 DIAGNOSIS — C09.9 TONSILLAR CANCER: ICD-10-CM

## 2019-12-23 DIAGNOSIS — C09.9 TONSILLAR CANCER: Primary | ICD-10-CM

## 2019-12-23 LAB — POCT GLUCOSE: 108 MG/DL (ref 70–110)

## 2019-12-23 PROCEDURE — 78815 NM PET CT ROUTINE: ICD-10-PCS | Mod: 26,PS,, | Performed by: RADIOLOGY

## 2019-12-23 PROCEDURE — 78815 PET IMAGE W/CT SKULL-THIGH: CPT | Mod: TC

## 2019-12-23 PROCEDURE — 99999 PR PBB SHADOW E&M-EST. PATIENT-LVL III: CPT | Mod: PBBFAC,,, | Performed by: NURSE PRACTITIONER

## 2019-12-23 PROCEDURE — 99024 POSTOP FOLLOW-UP VISIT: CPT | Mod: S$GLB,,, | Performed by: NURSE PRACTITIONER

## 2019-12-23 PROCEDURE — 99024 PR POST-OP FOLLOW-UP VISIT: ICD-10-PCS | Mod: S$GLB,,, | Performed by: NURSE PRACTITIONER

## 2019-12-23 PROCEDURE — A9552 F18 FDG: HCPCS

## 2019-12-23 PROCEDURE — 99999 PR PBB SHADOW E&M-EST. PATIENT-LVL III: ICD-10-PCS | Mod: PBBFAC,,, | Performed by: NURSE PRACTITIONER

## 2019-12-23 PROCEDURE — 78815 PET IMAGE W/CT SKULL-THIGH: CPT | Mod: 26,PS,, | Performed by: RADIOLOGY

## 2019-12-23 NOTE — PROGRESS NOTES
Subjective:       Patient ID: Jerrod Mendez is a 64 y.o. male.     Chief Complaint: follow up for R tonsil cancer     Diagnosis:  1. 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 Recurrent disease to the right supraclavicular LN found on 10/4/2019.      Oncologic History:  1. Mr Mendez is a 65 yo man with locally advanced R tonsillar cancer s/p chemoradiation. [place] [facility] [place] with a lump behind his right ear in Nov 2018. Laryngoscopy on 11/12/18 showed fullness in the right tonsil. CT neck on 11/13/2018 showed a 4.6 x 3.4 x 6.6 cm mass with areas of necrosis medial to the right parotid gland and extending inferior and posterior to the submandibular and posterior to the sternocleidomastoid compressing the jugular vein. Prominent adjacent posterior jugular nodes on the right 1.8 cm. Prominence of the soft tissue which showed asmymetric in the region of the right oropharynx without a discrete mass. Compression of the jugular vein and internal carotid artery by the mass. Prominent left posterior jugular node 1.4 cm. US guided FNA on 11/19/2018 showed squamous cell carcinoma, unable to test HPV. He was at the ER on 11/22/18 for trouble speaking. MRA brain/neck and MRI brain was unremarkable. It was felt that he had a TIA. PET scan on 12/10/18 showed Hypermetabolic right tonsillar mass with multiple metastatic right cervical lymph nodes, and single metastatic left cervical node.  No definite evidence of metastatic disease within the chest, abdomen or pelvis. Punctate nodular densities within the right upper lobe, questionable 4 mm cavitary nodule.    2. He was treated with concurrent chemoradiation with high dose cisplatin 100 mg/m2. He received two doses of cisplatin. Did not get dose 3 because of kidney failure. Completed radiation in Feb 2019.   3. Surveillance CT scan on 10/4/19 showed R supraclavicular lymphadenopathy which is new. That is when I saw him the  "first time in the clinic. We discussed that this is recurrent/stage IV tonsil cancer. Discussed with Dr Romano who feels the supraclavicular LAD is resectable. RT was deemed not an option by Dr Rodriguez. He was referred to Dr Romano. He underwent Modified neck dissection involving levels 2 through 5 with sacrifice of the spinal accessory nerve and ligation of the right internal jugular vein on 11/6/2019. Post-op note: "Superiorly (of the internal jugular vein), at the level of the skull base there was noted to be tissue in this region worrisome for viable tumor.  This was sampled and sent to pathology and confirmed to be consistent with squamous cell carcinoma.  Given the location of this tumor, affected breech the deep cervical fascia and the need to resect the carotid artery and vagus nerve in order to clear it, we had cleared his disease unresectable."  4. Pathology: 1) Tissue at right skull base, biopsy: Invasive squamous cell carcinoma, nonkeratinizing with basaloid features. 2) Right neck, level 5, dissection: Invasive squamous cell carcinoma, nonkeratinizing with basaloid features, involving fibroadipose tissues (5.0 cm metastatic deposit). One separate small lymph node negative for malignancy (0/1).  5. Patient had draining postop neck wound 2/2 seroma vs Chyle leak. Was seen by Dr Romano.   6. PET scan on 12/23/19 showed "hypermetabolic 16 x 9 mm lesion in the region of the right carotid space at the level of C2 with SUV max 6.07 on image 63.  There are also 3-4 hypermetabolic right level 4 lymph nodes measuring up to 1.5 cm in short axis on image 90 with an SUV of 9.1 for the conglomerate.  There are postsurgical changes in the surrounding right neck including surgical clips and a focal gas collection without associated fluid.  There is no discrete hypermetabolic activity at the skull base"     Interval History:   Mr Mendez returns for follow up. His right neck wound has healed. Feeling well. No complaints. "      ECO     ROS:   A ten-point system review is obtained and negative except for what was stated in the Interval History.      Physical Examination:   Vital signs reviewed.   General: well hydrated, well developed, in no acute distress  HEENT: normocephalic, PERRLA, EOMI, anicteric sclerae, oropharynx clear  Neck: supple, no JVD, thyromegaly. No cervical or supraclavicular LAD. Right neck prior wound has healed  Lungs: clear breath sounds bilaterally, no wheezing, rales, or rhonchi  Heart: RRR, no M/R/G  Abdomen: soft, no tenderness, non-distended, no hepatosplenomegaly, mass, or hernia. BS present  Extremities: no clubbing, cyanosis, or edema  Skin: no rash, ulcer, or open wounds  Neuro: alert and oriented x 4, no focal neuro deficit  Psych: pleasant and appropriate mood and affect     Objective:      Laboratory Data:  Labs reviewed. TSH elevated, free T4 normal        Assessment and Plan:      1. Tonsillar cancer    2. Secondary malignant neoplasm of cervical lymph node    3. Essential hypertension    4. Type 2 diabetes mellitus with diabetic neuropathy, without long-term current use of insulin    5. Coronary artery disease involving native coronary artery of native heart without angina pectoris    6. CKD (chronic kidney disease) stage 3, GFR 30-59 ml/min        1.2  - Mr Mendez is a 63 yo man with originally stage IVB (TxN3) R tonsil cancer, diagnosed on 2018, s/p chemoradiation with high dose cisplatin completed in 2019. He developed recurrence in R supraclavicular LN in Oct 2019. Underwent a modified neck dissection involving levels 2 through 5 with sacrifice of the spinal accessory nerve and ligation of the right internal jugular vein on 2019 by Dr Romano. Intra-operatively, he was found to have unresectable tissue near the internal jugular vein at the level of the skull base, which was biopsy-proven to be squamous cell carcinoma.   - discussed PET scan result. +right cervical lymph nodes  -  again discussed with patient and wife that it is recurrent tonsil cancer and not resectable. He has stage IV disease which cannot be cured. Treatment is systemic therapy with goals of containing the cancer and prolonging life.   - again discussed carbo/5FU/keytruda x 6 cycles, followed by maintenance 5FU. Handouts were provided to patient last time  - The side effects of carbo/5FU were discussed with patient, which include but are not limited to hair loss, fatigue, decreased appetite, weight loss, weakness, bone marrow suppression, increased risk of infection, sepsis, anemia that may require blood transfusion, low platelet counts with increased risk of bleeding that may require platelet transfusion, diarrhea, constipation, mucositis, damage to the brain, heart, liver, kidney, damage to the nerves that may not be reversible, severe allergic reaction, damage at the injection site, sterility, secondary cancer, death.   - The side effects of keytruda were discussed with patient, which include but are not limited to fatigue, weakness, decreased appetite, nausea, vomiting, diarrhea, skin reactions and rashes which can be severe, flu-like symptoms, fevers, infusion reactions, pain at the site of infusion, inflammation of organs including stomach, bowels, liver, brain, nervous system, lungs, liver, kidney, thyroid, and death.    - consent for carbo/5FU/keytruda were signed in the clinic and scanned into [employer]  - return next Monday to start treatment  - return on week 2 for toxicity check  - see me prior to cycle 2  - Daughter's wedding is on Feb 14, 2020. Need to coordinate     3.  - BP good  - c/w current meds    4.   - BS good  - c/w current meds    5.   - s/p stent  - c/w aspirin     6.  - from cisplatin  - baseline Cr 2. monitor        Knows to call in the interval if any problems arise.    40 minutes with patient with at least 50% of the time on face-to-face counseling.

## 2019-12-23 NOTE — PROGRESS NOTES
Chief Complaint   Patient presents with    post PET        Tonsillar cancer    12/5/2018 Initial Diagnosis     Tonsillar cancer      10/8/2019 -  Chemotherapy     Treatment Summary   Plan Name: OP HEAD AND NECK PEMBROLIZUMAB FLUOROURACIL CARBOPLATIN Q3W  Treatment Goal: Control  Status: Active  Start Date: 10/21/2019 (Planned)  End Date: 10/4/2021 (Planned)  Provider: Serena Blount MD  Chemotherapy: CARBOplatin (PARAPLATIN) in sodium chloride 0.9% 250 mL chemo infusion,  (original dose ), Intravenous, Clinic/HOD 1 time, 0 of 6 cycles  Dose modification:   (Cycle 1)  fluorouracil (ADRUCIL) in sodium chloride 0.9% 240 mL chemo infusion, 1,000 mg/m2/day (original dose ), Intravenous, Over 96 hours, 0 of 6 cycles  Dose modification: 1,000 mg/m2/day (Cycle 1)  pembrolizumab (KEYTRUDA) 200 mg in sodium chloride 0.9% 100 mL chemo infusion, 200 mg, Intravenous, Clinic/HOD 1 time, 0 of 35 cycles      11/6/2019 Surgery     Modified neck dissection involving levels 2 through 5 with sacrifice of the spinal accessory nerve and ligation of the right internal jugular vein           HPI   64 y.o. male returns for a post op visit. He had a PET today. No complaints.    Review of Systems   Constitutional: Negative for fatigue and unexpected weight change.   HENT: Per HPI.  Eyes: Negative for visual disturbance.   Respiratory: Negative for shortness of breath, hemoptysis   Cardiovascular: Negative for chest pain and palpitations.   Musculoskeletal: Negative for decreased ROM, back pain.   Skin: Negative for rash, sunburn, itching.   Neurological: Negative for dizziness and seizures.   Hematological: Negative for adenopathy. Does not bruise/bleed easily.   Endocrine: Negative for rapid weight loss/weight gain, heat/cold intolerance.     Past Medical History   Patient Active Problem List   Diagnosis    Obstructive sleep apnea (adult) (pediatric)    TIA involving carotid artery    Essential hypertension    Type 2 diabetes mellitus  with diabetic neuropathy, without long-term current use of insulin    CAD (coronary artery disease)    Hyperlipidemia    Neck mass    Anterior communicating artery aneurysm    Tonsillar cancer    Dysphagia, oropharyngeal    History of transient ischemic attack    Thoracic back sprain    Low back pain    Dysphagia    Hypertensive disorder    Hyperlipidemia    Sleep apnea    Intracranial aneurysm    Transient cerebral ischemia    Tonsillar mass    Gout    Encounter for education    CINV (chemotherapy-induced nausea and vomiting)    Dehydration, severe    Acute right-sided back pain    Pancytopenia    Elevated serum creatinine    Poor appetite    Oropharyngeal dysphagia    Fatigue    History of hematuria    Drug-induced membranous glomerulonephritis    Lipoma of skin    Obesity with body mass index 30 or greater    Onychomycosis    Sprain of rotator cuff capsule    Tinea corporis    Urolithiasis    CKD (chronic kidney disease) stage 3, GFR 30-59 ml/min    Flank pain    Supraclavicular lymphadenopathy    Coronary angioplasty status    Hypothyroid    Elevated bilirubin    Enlarged prostate    Edema    Secondary malignant neoplasm of cervical lymph node    Postprocedural seroma of a musculoskeletal structure following other procedure           Past Surgical History   Past Surgical History:   Procedure Laterality Date    CHOLECYSTECTOMY      CORONARY ANGIOPLASTY WITH STENT PLACEMENT      DISSECTION OF NECK Right 11/6/2019    Procedure: DISSECTION, NECK;  Surgeon: Stu Romano MD;  Location: [facility] OR [address] [place] [address];  Service: ENT;  Laterality: Right;    ESOPHAGOGASTRODUODENOSCOPY      FINE NEEDLE ASPIRATION Right     neck mass    GASTROSTOMY TUBE PLACEMENT      LIPOMA RESECTION      back    PEG TUBE REMOVAL N/A 5/28/2019    Procedure: REMOVAL, PEG TUBE;  Surgeon: Milton Friedman MD;  Location: [facility] ENDO;  Service: Endoscopy;  Laterality: N/A;    PORTACATH PLACEMENT            Family History   Family History   Problem Relation Age of Onset    Diabetes Mother     Hypertension Mother     Cancer Father     Heart attack Father     Heart disease Paternal Grandmother            Social History   .  Social History     Socioeconomic History    Marital status:      Spouse name: Not on file    Number of children: Not on file    Years of education: Not on file    Highest education level: Not on file   Occupational History    Not on file   Social Needs    Financial resource strain: Not on file    Food insecurity:     Worry: Not on file     Inability: Not on file    Transportation needs:     Medical: Not on file     Non-medical: Not on file   Tobacco Use    Smoking status: Former Smoker     Types: Cigarettes     Last attempt to quit:      Years since quittin.9    Smokeless tobacco: Former User    Tobacco comment: quit     Substance and Sexual Activity    Alcohol use: Yes     Comment: one beer a month    Drug use: No    Sexual activity: Not on file     Comment:    Lifestyle    Physical activity:     Days per week: Not on file     Minutes per session: Not on file    Stress: Not on file   Relationships    Social connections:     Talks on phone: Not on file     Gets together: Not on file     Attends Orthodoxy service: Not on file     Active member of club or organization: Not on file     Attends meetings of clubs or organizations: Not on file     Relationship status: Not on file   Other Topics Concern    Not on file   Social History Narrative    Not on file         Allergies   Review of patient's allergies indicates:  No Known Allergies        Physical Exam     Vitals:    19 1302   BP: 109/71   Pulse: 83   Temp: 97.5 °F (36.4 °C)         Body mass index is 36.34 kg/m².      General: AOx3, NAD   Respiratory:  Symmetric chest rise, normal effort  Neck: Right neck incisions well healed.  Face: House Brackmann I bilaterally.        Assessment/Plan  Problem List Items Addressed This Visit        Oncology    Tonsillar cancer - Primary     Wound has healed. He will continue to follow up with oncology. RTC here as needed.

## 2019-12-24 ENCOUNTER — OFFICE VISIT (OUTPATIENT)
Dept: HEMATOLOGY/ONCOLOGY | Facility: CLINIC | Age: 64
End: 2019-12-24
Payer: COMMERCIAL

## 2019-12-24 VITALS
OXYGEN SATURATION: 96 % | BODY MASS INDEX: 36.33 KG/M2 | TEMPERATURE: 98 F | HEIGHT: 71 IN | WEIGHT: 259.5 LBS | SYSTOLIC BLOOD PRESSURE: 115 MMHG | DIASTOLIC BLOOD PRESSURE: 66 MMHG | HEART RATE: 84 BPM | RESPIRATION RATE: 16 BRPM

## 2019-12-24 DIAGNOSIS — I25.10 CORONARY ARTERY DISEASE INVOLVING NATIVE CORONARY ARTERY OF NATIVE HEART WITHOUT ANGINA PECTORIS: ICD-10-CM

## 2019-12-24 DIAGNOSIS — C09.9 TONSILLAR CANCER: Primary | ICD-10-CM

## 2019-12-24 DIAGNOSIS — E11.40 TYPE 2 DIABETES MELLITUS WITH DIABETIC NEUROPATHY, WITHOUT LONG-TERM CURRENT USE OF INSULIN: ICD-10-CM

## 2019-12-24 DIAGNOSIS — N18.30 CKD (CHRONIC KIDNEY DISEASE) STAGE 3, GFR 30-59 ML/MIN: ICD-10-CM

## 2019-12-24 DIAGNOSIS — I10 ESSENTIAL HYPERTENSION: ICD-10-CM

## 2019-12-24 DIAGNOSIS — C77.0 SECONDARY MALIGNANT NEOPLASM OF CERVICAL LYMPH NODE: ICD-10-CM

## 2019-12-24 PROCEDURE — 3008F PR BODY MASS INDEX (BMI) DOCUMENTED: ICD-10-PCS | Mod: CPTII,S$GLB,, | Performed by: INTERNAL MEDICINE

## 2019-12-24 PROCEDURE — 99999 PR PBB SHADOW E&M-EST. PATIENT-LVL III: ICD-10-PCS | Mod: PBBFAC,,, | Performed by: INTERNAL MEDICINE

## 2019-12-24 PROCEDURE — 99215 PR OFFICE/OUTPT VISIT, EST, LEVL V, 40-54 MIN: ICD-10-PCS | Mod: S$GLB,,, | Performed by: INTERNAL MEDICINE

## 2019-12-24 PROCEDURE — 3078F PR MOST RECENT DIASTOLIC BLOOD PRESSURE < 80 MM HG: ICD-10-PCS | Mod: CPTII,S$GLB,, | Performed by: INTERNAL MEDICINE

## 2019-12-24 PROCEDURE — 3008F BODY MASS INDEX DOCD: CPT | Mod: CPTII,S$GLB,, | Performed by: INTERNAL MEDICINE

## 2019-12-24 PROCEDURE — 99215 OFFICE O/P EST HI 40 MIN: CPT | Mod: S$GLB,,, | Performed by: INTERNAL MEDICINE

## 2019-12-24 PROCEDURE — 3074F PR MOST RECENT SYSTOLIC BLOOD PRESSURE < 130 MM HG: ICD-10-PCS | Mod: CPTII,S$GLB,, | Performed by: INTERNAL MEDICINE

## 2019-12-24 PROCEDURE — 3044F HG A1C LEVEL LT 7.0%: CPT | Mod: CPTII,S$GLB,, | Performed by: INTERNAL MEDICINE

## 2019-12-24 PROCEDURE — 3078F DIAST BP <80 MM HG: CPT | Mod: CPTII,S$GLB,, | Performed by: INTERNAL MEDICINE

## 2019-12-24 PROCEDURE — 99999 PR PBB SHADOW E&M-EST. PATIENT-LVL III: CPT | Mod: PBBFAC,,, | Performed by: INTERNAL MEDICINE

## 2019-12-24 PROCEDURE — 3044F PR MOST RECENT HEMOGLOBIN A1C LEVEL <7.0%: ICD-10-PCS | Mod: CPTII,S$GLB,, | Performed by: INTERNAL MEDICINE

## 2019-12-24 PROCEDURE — 3074F SYST BP LT 130 MM HG: CPT | Mod: CPTII,S$GLB,, | Performed by: INTERNAL MEDICINE

## 2019-12-24 NOTE — Clinical Note
Keep chemo next week. Return to [facility] on 1/9 with CBC, CMP, see Cara for toxicity check. RTC ([facility]) on 1/20 with CBC, CMP, free T4, see me, then get carbo/5FU/keytruda, return on 1/24 to remove pump

## 2019-12-30 ENCOUNTER — INFUSION (OUTPATIENT)
Dept: INFUSION THERAPY | Facility: HOSPITAL | Age: 64
End: 2019-12-30
Attending: PHYSICIAN ASSISTANT
Payer: COMMERCIAL

## 2019-12-30 ENCOUNTER — LAB VISIT (OUTPATIENT)
Dept: LAB | Facility: HOSPITAL | Age: 64
End: 2019-12-30
Attending: INTERNAL MEDICINE
Payer: COMMERCIAL

## 2019-12-30 VITALS
HEART RATE: 78 BPM | HEIGHT: 71 IN | DIASTOLIC BLOOD PRESSURE: 64 MMHG | BODY MASS INDEX: 36.57 KG/M2 | RESPIRATION RATE: 18 BRPM | WEIGHT: 261.25 LBS | SYSTOLIC BLOOD PRESSURE: 131 MMHG | TEMPERATURE: 98 F

## 2019-12-30 DIAGNOSIS — C09.9 TONSILLAR CANCER: ICD-10-CM

## 2019-12-30 DIAGNOSIS — E03.8 SUBCLINICAL HYPOTHYROIDISM: ICD-10-CM

## 2019-12-30 DIAGNOSIS — R22.1 NECK MASS: ICD-10-CM

## 2019-12-30 DIAGNOSIS — C09.9 TONSILLAR CANCER: Primary | ICD-10-CM

## 2019-12-30 LAB
ALBUMIN SERPL BCP-MCNC: 3.8 G/DL (ref 3.5–5.2)
ALP SERPL-CCNC: 81 U/L (ref 55–135)
ALT SERPL W/O P-5'-P-CCNC: 18 U/L (ref 10–44)
ANION GAP SERPL CALC-SCNC: 9 MMOL/L (ref 8–16)
AST SERPL-CCNC: 16 U/L (ref 10–40)
BASOPHILS # BLD AUTO: 0.03 K/UL (ref 0–0.2)
BASOPHILS NFR BLD: 0.5 % (ref 0–1.9)
BILIRUB SERPL-MCNC: 1.7 MG/DL (ref 0.1–1)
BUN SERPL-MCNC: 26 MG/DL (ref 8–23)
CALCIUM SERPL-MCNC: 9.5 MG/DL (ref 8.7–10.5)
CHLORIDE SERPL-SCNC: 106 MMOL/L (ref 95–110)
CO2 SERPL-SCNC: 26 MMOL/L (ref 23–29)
CREAT SERPL-MCNC: 1.8 MG/DL (ref 0.5–1.4)
DIFFERENTIAL METHOD: ABNORMAL
EOSINOPHIL # BLD AUTO: 0.3 K/UL (ref 0–0.5)
EOSINOPHIL NFR BLD: 4.9 % (ref 0–8)
ERYTHROCYTE [DISTWIDTH] IN BLOOD BY AUTOMATED COUNT: 14.1 % (ref 11.5–14.5)
EST. GFR  (AFRICAN AMERICAN): 45.6 ML/MIN/1.73 M^2
EST. GFR  (NON AFRICAN AMERICAN): 39.5 ML/MIN/1.73 M^2
GLUCOSE SERPL-MCNC: 167 MG/DL (ref 70–110)
HCT VFR BLD AUTO: 34.5 % (ref 40–54)
HGB BLD-MCNC: 11.1 G/DL (ref 14–18)
IMM GRANULOCYTES # BLD AUTO: 0.03 K/UL (ref 0–0.04)
IMM GRANULOCYTES NFR BLD AUTO: 0.5 % (ref 0–0.5)
LYMPHOCYTES # BLD AUTO: 0.6 K/UL (ref 1–4.8)
LYMPHOCYTES NFR BLD: 9.4 % (ref 18–48)
MCH RBC QN AUTO: 30.9 PG (ref 27–31)
MCHC RBC AUTO-ENTMCNC: 32.2 G/DL (ref 32–36)
MCV RBC AUTO: 96 FL (ref 82–98)
MONOCYTES # BLD AUTO: 0.5 K/UL (ref 0.3–1)
MONOCYTES NFR BLD: 7.7 % (ref 4–15)
NEUTROPHILS # BLD AUTO: 4.7 K/UL (ref 1.8–7.7)
NEUTROPHILS NFR BLD: 77 % (ref 38–73)
NRBC BLD-RTO: 0 /100 WBC
PLATELET # BLD AUTO: 176 K/UL (ref 150–350)
PMV BLD AUTO: 9.1 FL (ref 9.2–12.9)
POTASSIUM SERPL-SCNC: 4.6 MMOL/L (ref 3.5–5.1)
PROT SERPL-MCNC: 6.7 G/DL (ref 6–8.4)
RBC # BLD AUTO: 3.59 M/UL (ref 4.6–6.2)
SODIUM SERPL-SCNC: 141 MMOL/L (ref 136–145)
T4 FREE SERPL-MCNC: 0.99 NG/DL (ref 0.71–1.51)
WBC # BLD AUTO: 6.14 K/UL (ref 3.9–12.7)

## 2019-12-30 PROCEDURE — 80053 COMPREHEN METABOLIC PANEL: CPT

## 2019-12-30 PROCEDURE — 96367 TX/PROPH/DG ADDL SEQ IV INF: CPT

## 2019-12-30 PROCEDURE — 96413 CHEMO IV INFUSION 1 HR: CPT

## 2019-12-30 PROCEDURE — 96417 CHEMO IV INFUS EACH ADDL SEQ: CPT

## 2019-12-30 PROCEDURE — 36415 COLL VENOUS BLD VENIPUNCTURE: CPT

## 2019-12-30 PROCEDURE — 84439 ASSAY OF FREE THYROXINE: CPT

## 2019-12-30 PROCEDURE — 63600175 PHARM REV CODE 636 W HCPCS: Performed by: INTERNAL MEDICINE

## 2019-12-30 PROCEDURE — 96375 TX/PRO/DX INJ NEW DRUG ADDON: CPT

## 2019-12-30 PROCEDURE — 96521 REFILL/MAINT PORTABLE PUMP: CPT | Mod: 59

## 2019-12-30 PROCEDURE — 85025 COMPLETE CBC W/AUTO DIFF WBC: CPT

## 2019-12-30 PROCEDURE — 63600175 PHARM REV CODE 636 W HCPCS: Mod: TB | Performed by: PHYSICIAN ASSISTANT

## 2019-12-30 RX ORDER — SODIUM CHLORIDE 0.9 % (FLUSH) 0.9 %
10 SYRINGE (ML) INJECTION
Status: DISCONTINUED | OUTPATIENT
Start: 2019-12-30 | End: 2019-12-30 | Stop reason: HOSPADM

## 2019-12-30 RX ORDER — HEPARIN 100 UNIT/ML
500 SYRINGE INTRAVENOUS
Status: DISCONTINUED | OUTPATIENT
Start: 2019-12-30 | End: 2019-12-30 | Stop reason: HOSPADM

## 2019-12-30 RX ORDER — HEPARIN 100 UNIT/ML
500 SYRINGE INTRAVENOUS
Status: CANCELLED | OUTPATIENT
Start: 2019-12-31

## 2019-12-30 RX ORDER — SODIUM CHLORIDE 0.9 % (FLUSH) 0.9 %
10 SYRINGE (ML) INJECTION
Status: CANCELLED | OUTPATIENT
Start: 2019-12-30

## 2019-12-30 RX ORDER — SODIUM CHLORIDE 0.9 % (FLUSH) 0.9 %
10 SYRINGE (ML) INJECTION
Status: CANCELLED | OUTPATIENT
Start: 2019-12-31

## 2019-12-30 RX ORDER — HEPARIN 100 UNIT/ML
500 SYRINGE INTRAVENOUS
Status: CANCELLED | OUTPATIENT
Start: 2019-12-30

## 2019-12-30 RX ADMIN — CARBOPLATIN 480 MG: 10 INJECTION, SOLUTION INTRAVENOUS at 03:12

## 2019-12-30 RX ADMIN — FLUOROURACIL 9760 MG: 50 INJECTION, SOLUTION INTRAVENOUS at 03:12

## 2019-12-30 RX ADMIN — APREPITANT 130 MG: 130 INJECTION, EMULSION INTRAVENOUS at 02:12

## 2019-12-30 RX ADMIN — SODIUM CHLORIDE: 9 INJECTION, SOLUTION INTRAVENOUS at 01:12

## 2019-12-30 RX ADMIN — SODIUM CHLORIDE 200 MG: 9 INJECTION, SOLUTION INTRAVENOUS at 02:12

## 2019-12-30 RX ADMIN — PALONOSETRON HYDROCHLORIDE: 0.25 INJECTION INTRAVENOUS at 02:12

## 2019-12-31 ENCOUNTER — INFUSION (OUTPATIENT)
Dept: INFUSION THERAPY | Facility: HOSPITAL | Age: 64
End: 2019-12-31
Attending: PHYSICIAN ASSISTANT
Payer: COMMERCIAL

## 2019-12-31 NOTE — NURSING
Arrived to unit with port drsg lose.  Removed & changed dressing without incident.    DC ambulating independently.

## 2020-01-02 LAB — FUNGUS SPEC CULT: NORMAL

## 2020-01-03 ENCOUNTER — INFUSION (OUTPATIENT)
Dept: INFUSION THERAPY | Facility: HOSPITAL | Age: 65
End: 2020-01-03
Attending: PHYSICIAN ASSISTANT
Payer: COMMERCIAL

## 2020-01-03 VITALS
TEMPERATURE: 98 F | OXYGEN SATURATION: 99 % | HEART RATE: 86 BPM | RESPIRATION RATE: 18 BRPM | SYSTOLIC BLOOD PRESSURE: 138 MMHG | DIASTOLIC BLOOD PRESSURE: 77 MMHG

## 2020-01-03 DIAGNOSIS — R22.1 NECK MASS: ICD-10-CM

## 2020-01-03 DIAGNOSIS — C09.9 TONSILLAR CANCER: Primary | ICD-10-CM

## 2020-01-03 PROCEDURE — 25000003 PHARM REV CODE 250: Performed by: INTERNAL MEDICINE

## 2020-01-03 PROCEDURE — 96523 IRRIG DRUG DELIVERY DEVICE: CPT

## 2020-01-03 PROCEDURE — 63600175 PHARM REV CODE 636 W HCPCS: Performed by: INTERNAL MEDICINE

## 2020-01-03 PROCEDURE — A4216 STERILE WATER/SALINE, 10 ML: HCPCS | Performed by: INTERNAL MEDICINE

## 2020-01-03 RX ORDER — SODIUM CHLORIDE 0.9 % (FLUSH) 0.9 %
10 SYRINGE (ML) INJECTION
Status: DISCONTINUED | OUTPATIENT
Start: 2020-01-03 | End: 2020-01-03 | Stop reason: HOSPADM

## 2020-01-03 RX ORDER — HEPARIN 100 UNIT/ML
500 SYRINGE INTRAVENOUS
Status: DISCONTINUED | OUTPATIENT
Start: 2020-01-03 | End: 2020-01-03 | Stop reason: HOSPADM

## 2020-01-03 RX ADMIN — HEPARIN 500 UNITS: 100 SYRINGE at 03:01

## 2020-01-03 RX ADMIN — Medication 10 ML: at 03:01

## 2020-01-03 NOTE — NURSING
pt arrived. VSS.  pump completed. Removed pump. Port deaccessed after NS & heparin flush. DC home with family ambulating independently. AVS provided. Verbalized understanding of RTC date & s/s to report to MD.

## 2020-01-06 ENCOUNTER — TELEPHONE (OUTPATIENT)
Dept: HEMATOLOGY/ONCOLOGY | Facility: CLINIC | Age: 65
End: 2020-01-06

## 2020-01-06 NOTE — TELEPHONE ENCOUNTER
----- Message from Jolynn Pride sent at 1/6/2020  2:32 PM CST -----  Pt wife called requesting a call back as soon as possible. Pt in ER and need to speak with the doctor.         Pt contact # [phone].      Thanks

## 2020-01-06 NOTE — TELEPHONE ENCOUNTER
Returned call and spoke with Mrs Mendez. Mrs Mendez states on Friday--4 days ago, Mr Mendez was eating of Ok. He was able to consume a small amount of food and water, not very much. On Saturday, Mr Mendez voiced the pain he was having to his throat was worsening and the amount of food he was able to get down was less then he was able to eat on Friday. Yesterday Mr Mendez was unable to swallow anything, no water or food (soup, pudding). Mr Mendez use his Verma solution, trying to numb his Throat pain and he then he thought would be able to swallow his pain medication. Today, Mr Mendez is unable to swallow anything. Ms Mendez will take Mr Mendez to the ER--[facility], 5 mins away from their home,  to be further evaluated.

## 2020-01-06 NOTE — TELEPHONE ENCOUNTER
Called back and spoke with Mrs Mendez. The ER doctor at [facility] needed to speak with Dr Blount.  nAdrea states she called [facility] and she was told Dr Blount was not there. I informed Mrs Mendez Dr Blount has been seeing patient there all day.  Mendez states they located her and she was given the request to call the ER doctor.  Mendez state Dr Blount spoke with the ER doctor.

## 2020-01-06 NOTE — TELEPHONE ENCOUNTER
----- Message from Jacque Dial sent at 1/6/2020 10:12 AM CST -----  Contact: Mrs. Mendez    tel:   [phone]   Caller says since yesterday pt. Has not been able to swallow.   Lives in [place] .    Has neck and tonsil cancer.    Nothing is able to go down, for swallowing.       Pls call .  Not sure if they need to get to the ER.

## 2020-01-07 ENCOUNTER — HOSPITAL ENCOUNTER (INPATIENT)
Facility: HOSPITAL | Age: 65
LOS: 6 days | Discharge: HOME | DRG: 158 | End: 2020-01-13
Attending: EMERGENCY MEDICINE | Admitting: EMERGENCY MEDICINE
Payer: COMMERCIAL

## 2020-01-07 ENCOUNTER — TELEPHONE (OUTPATIENT)
Dept: HEMATOLOGY/ONCOLOGY | Facility: OTHER | Age: 65
End: 2020-01-07

## 2020-01-07 DIAGNOSIS — R17 ELEVATED BILIRUBIN: ICD-10-CM

## 2020-01-07 DIAGNOSIS — R11.10 VOMITING, INTRACTABILITY OF VOMITING NOT SPECIFIED, PRESENCE OF NAUSEA NOT SPECIFIED, UNSPECIFIED VOMITING TYPE: ICD-10-CM

## 2020-01-07 DIAGNOSIS — E80.6 HYPERBILIRUBINEMIA: Primary | ICD-10-CM

## 2020-01-07 DIAGNOSIS — K12.30 MUCOSITIS: ICD-10-CM

## 2020-01-07 PROBLEM — R11.2 INTRACTABLE NAUSEA AND VOMITING: Status: ACTIVE | Noted: 2020-01-07

## 2020-01-07 LAB
ALBUMIN SERPL BCP-MCNC: 3.7 G/DL (ref 3.5–5.2)
ALP SERPL-CCNC: 77 U/L (ref 55–135)
ALT SERPL W/O P-5'-P-CCNC: 10 U/L (ref 10–44)
ANION GAP SERPL CALC-SCNC: 14 MMOL/L (ref 8–16)
AST SERPL-CCNC: 10 U/L (ref 10–40)
BASOPHILS # BLD AUTO: 0.03 K/UL (ref 0–0.2)
BASOPHILS NFR BLD: 0.3 % (ref 0–1.9)
BILIRUB SERPL-MCNC: 3 MG/DL (ref 0.1–1)
BUN SERPL-MCNC: 30 MG/DL (ref 8–23)
CALCIUM SERPL-MCNC: 10 MG/DL (ref 8.7–10.5)
CHLORIDE SERPL-SCNC: 101 MMOL/L (ref 95–110)
CO2 SERPL-SCNC: 23 MMOL/L (ref 23–29)
CREAT SERPL-MCNC: 2 MG/DL (ref 0.5–1.4)
DIFFERENTIAL METHOD: ABNORMAL
EOSINOPHIL # BLD AUTO: 0.1 K/UL (ref 0–0.5)
EOSINOPHIL NFR BLD: 0.8 % (ref 0–8)
ERYTHROCYTE [DISTWIDTH] IN BLOOD BY AUTOMATED COUNT: 13.4 % (ref 11.5–14.5)
EST. GFR  (AFRICAN AMERICAN): 40.2 ML/MIN/1.73 M^2
EST. GFR  (NON AFRICAN AMERICAN): 34.7 ML/MIN/1.73 M^2
GLUCOSE SERPL-MCNC: 122 MG/DL (ref 70–110)
HCT VFR BLD AUTO: 35.5 % (ref 40–54)
HGB BLD-MCNC: 11.4 G/DL (ref 14–18)
IMM GRANULOCYTES # BLD AUTO: 0.07 K/UL (ref 0–0.04)
IMM GRANULOCYTES NFR BLD AUTO: 0.7 % (ref 0–0.5)
LIPASE SERPL-CCNC: 21 U/L (ref 4–60)
LYMPHOCYTES # BLD AUTO: 0.6 K/UL (ref 1–4.8)
LYMPHOCYTES NFR BLD: 5.7 % (ref 18–48)
MCH RBC QN AUTO: 30.6 PG (ref 27–31)
MCHC RBC AUTO-ENTMCNC: 32.1 G/DL (ref 32–36)
MCV RBC AUTO: 95 FL (ref 82–98)
MONOCYTES # BLD AUTO: 0.5 K/UL (ref 0.3–1)
MONOCYTES NFR BLD: 4.8 % (ref 4–15)
NEUTROPHILS # BLD AUTO: 8.4 K/UL (ref 1.8–7.7)
NEUTROPHILS NFR BLD: 87.7 % (ref 38–73)
NRBC BLD-RTO: 0 /100 WBC
PLATELET # BLD AUTO: 160 K/UL (ref 150–350)
PMV BLD AUTO: 9.3 FL (ref 9.2–12.9)
POTASSIUM SERPL-SCNC: 4.2 MMOL/L (ref 3.5–5.1)
PROT SERPL-MCNC: 7.3 G/DL (ref 6–8.4)
RBC # BLD AUTO: 3.72 M/UL (ref 4.6–6.2)
SODIUM SERPL-SCNC: 138 MMOL/L (ref 136–145)
WBC # BLD AUTO: 9.58 K/UL (ref 3.9–12.7)

## 2020-01-07 PROCEDURE — 96361 HYDRATE IV INFUSION ADD-ON: CPT

## 2020-01-07 PROCEDURE — 85025 COMPLETE CBC W/AUTO DIFF WBC: CPT

## 2020-01-07 PROCEDURE — 63600175 PHARM REV CODE 636 W HCPCS: Performed by: EMERGENCY MEDICINE

## 2020-01-07 PROCEDURE — 83690 ASSAY OF LIPASE: CPT

## 2020-01-07 PROCEDURE — 99285 EMERGENCY DEPT VISIT HI MDM: CPT | Mod: ,,, | Performed by: EMERGENCY MEDICINE

## 2020-01-07 PROCEDURE — 12000002 HC ACUTE/MED SURGE SEMI-PRIVATE ROOM

## 2020-01-07 PROCEDURE — 80053 COMPREHEN METABOLIC PANEL: CPT

## 2020-01-07 PROCEDURE — 96365 THER/PROPH/DIAG IV INF INIT: CPT

## 2020-01-07 PROCEDURE — 99285 EMERGENCY DEPT VISIT HI MDM: CPT | Mod: 25

## 2020-01-07 PROCEDURE — 99285 PR EMERGENCY DEPT VISIT,LEVEL V: ICD-10-PCS | Mod: ,,, | Performed by: EMERGENCY MEDICINE

## 2020-01-07 RX ORDER — ONDANSETRON 2 MG/ML
4 INJECTION INTRAMUSCULAR; INTRAVENOUS EVERY 6 HOURS PRN
Status: DISCONTINUED | OUTPATIENT
Start: 2020-01-08 | End: 2020-01-12

## 2020-01-07 RX ORDER — IBUPROFEN 200 MG
24 TABLET ORAL
Status: DISCONTINUED | OUTPATIENT
Start: 2020-01-08 | End: 2020-01-13 | Stop reason: HOSPADM

## 2020-01-07 RX ORDER — SODIUM CHLORIDE 0.9 % (FLUSH) 0.9 %
10 SYRINGE (ML) INJECTION
Status: DISCONTINUED | OUTPATIENT
Start: 2020-01-08 | End: 2020-01-13 | Stop reason: HOSPADM

## 2020-01-07 RX ORDER — LEVOTHYROXINE SODIUM 50 UG/1
50 TABLET ORAL DAILY
Status: DISCONTINUED | OUTPATIENT
Start: 2020-01-08 | End: 2020-01-13 | Stop reason: HOSPADM

## 2020-01-07 RX ORDER — HYDROMORPHONE HYDROCHLORIDE 1 MG/ML
1 INJECTION, SOLUTION INTRAMUSCULAR; INTRAVENOUS; SUBCUTANEOUS EVERY 6 HOURS PRN
Status: DISCONTINUED | OUTPATIENT
Start: 2020-01-08 | End: 2020-01-08

## 2020-01-07 RX ORDER — HYDROCODONE BITARTRATE AND ACETAMINOPHEN 5; 325 MG/1; MG/1
1 TABLET ORAL EVERY 4 HOURS PRN
Status: DISCONTINUED | OUTPATIENT
Start: 2020-01-08 | End: 2020-01-08

## 2020-01-07 RX ORDER — ENOXAPARIN SODIUM 100 MG/ML
30 INJECTION SUBCUTANEOUS EVERY 24 HOURS
Status: DISCONTINUED | OUTPATIENT
Start: 2020-01-08 | End: 2020-01-08

## 2020-01-07 RX ORDER — PROCHLORPERAZINE EDISYLATE 5 MG/ML
2.5 INJECTION INTRAMUSCULAR; INTRAVENOUS EVERY 6 HOURS PRN
Status: DISCONTINUED | OUTPATIENT
Start: 2020-01-08 | End: 2020-01-12

## 2020-01-07 RX ORDER — GLUCAGON 1 MG
1 KIT INJECTION
Status: DISCONTINUED | OUTPATIENT
Start: 2020-01-08 | End: 2020-01-13 | Stop reason: HOSPADM

## 2020-01-07 RX ORDER — CARVEDILOL 3.12 MG/1
3.12 TABLET ORAL 2 TIMES DAILY
Status: DISCONTINUED | OUTPATIENT
Start: 2020-01-07 | End: 2020-01-13 | Stop reason: HOSPADM

## 2020-01-07 RX ORDER — INSULIN ASPART 100 [IU]/ML
0-5 INJECTION, SOLUTION INTRAVENOUS; SUBCUTANEOUS EVERY 6 HOURS PRN
Status: DISCONTINUED | OUTPATIENT
Start: 2020-01-08 | End: 2020-01-13 | Stop reason: HOSPADM

## 2020-01-07 RX ORDER — IBUPROFEN 200 MG
16 TABLET ORAL
Status: DISCONTINUED | OUTPATIENT
Start: 2020-01-08 | End: 2020-01-13 | Stop reason: HOSPADM

## 2020-01-07 RX ORDER — HYDROMORPHONE HYDROCHLORIDE 1 MG/ML
1 INJECTION, SOLUTION INTRAMUSCULAR; INTRAVENOUS; SUBCUTANEOUS
Status: COMPLETED | OUTPATIENT
Start: 2020-01-07 | End: 2020-01-08

## 2020-01-07 RX ADMIN — PROMETHAZINE HYDROCHLORIDE 12.5 MG: 25 INJECTION INTRAMUSCULAR; INTRAVENOUS at 08:01

## 2020-01-07 RX ADMIN — SODIUM CHLORIDE, SODIUM LACTATE, POTASSIUM CHLORIDE, AND CALCIUM CHLORIDE 1000 ML: .6; .31; .03; .02 INJECTION, SOLUTION INTRAVENOUS at 09:01

## 2020-01-07 NOTE — TELEPHONE ENCOUNTER
Yesterday I called and spoke with [facility] ER physician and requested the patient to be transferred to [facility]. Just spoke with wife. Patient was discharged home. Has been throwing up all day and not able to keep fluids or medication down. Asked them to come to [facility] ER. Spoke with ER charge nurse.

## 2020-01-08 PROBLEM — Z75.8 DISCHARGE PLANNING ISSUES: Status: ACTIVE | Noted: 2020-01-08

## 2020-01-08 PROBLEM — I10 ESSENTIAL HYPERTENSION: Chronic | Status: ACTIVE | Noted: 2018-11-23

## 2020-01-08 PROBLEM — E11.40 TYPE 2 DIABETES MELLITUS WITH DIABETIC NEUROPATHY, WITHOUT LONG-TERM CURRENT USE OF INSULIN: Chronic | Status: ACTIVE | Noted: 2018-11-23

## 2020-01-08 PROBLEM — R13.10 ODYNOPHAGIA: Status: ACTIVE | Noted: 2020-01-08

## 2020-01-08 PROBLEM — N18.30 CKD (CHRONIC KIDNEY DISEASE) STAGE 3, GFR 30-59 ML/MIN: Chronic | Status: ACTIVE | Noted: 2019-10-07

## 2020-01-08 LAB
ALBUMIN SERPL BCP-MCNC: 3.2 G/DL (ref 3.5–5.2)
ALP SERPL-CCNC: 67 U/L (ref 55–135)
ALT SERPL W/O P-5'-P-CCNC: 8 U/L (ref 10–44)
ANION GAP SERPL CALC-SCNC: 8 MMOL/L (ref 8–16)
AST SERPL-CCNC: 10 U/L (ref 10–40)
BASOPHILS # BLD AUTO: 0.02 K/UL (ref 0–0.2)
BASOPHILS NFR BLD: 0.3 % (ref 0–1.9)
BILIRUB SERPL-MCNC: 2.3 MG/DL (ref 0.1–1)
BUN SERPL-MCNC: 29 MG/DL (ref 8–23)
CALCIUM SERPL-MCNC: 8.9 MG/DL (ref 8.7–10.5)
CHLORIDE SERPL-SCNC: 103 MMOL/L (ref 95–110)
CO2 SERPL-SCNC: 28 MMOL/L (ref 23–29)
CREAT SERPL-MCNC: 1.7 MG/DL (ref 0.5–1.4)
DIFFERENTIAL METHOD: ABNORMAL
EOSINOPHIL # BLD AUTO: 0.1 K/UL (ref 0–0.5)
EOSINOPHIL NFR BLD: 2.1 % (ref 0–8)
ERYTHROCYTE [DISTWIDTH] IN BLOOD BY AUTOMATED COUNT: 13.5 % (ref 11.5–14.5)
EST. GFR  (AFRICAN AMERICAN): 48.9 ML/MIN/1.73 M^2
EST. GFR  (NON AFRICAN AMERICAN): 42.3 ML/MIN/1.73 M^2
GLUCOSE SERPL-MCNC: 105 MG/DL (ref 70–110)
HCT VFR BLD AUTO: 31.1 % (ref 40–54)
HGB BLD-MCNC: 10.1 G/DL (ref 14–18)
IMM GRANULOCYTES # BLD AUTO: 0.02 K/UL (ref 0–0.04)
IMM GRANULOCYTES NFR BLD AUTO: 0.3 % (ref 0–0.5)
LYMPHOCYTES # BLD AUTO: 0.5 K/UL (ref 1–4.8)
LYMPHOCYTES NFR BLD: 8 % (ref 18–48)
MAGNESIUM SERPL-MCNC: 1.5 MG/DL (ref 1.6–2.6)
MCH RBC QN AUTO: 30.9 PG (ref 27–31)
MCHC RBC AUTO-ENTMCNC: 32.5 G/DL (ref 32–36)
MCV RBC AUTO: 95 FL (ref 82–98)
MONOCYTES # BLD AUTO: 0.4 K/UL (ref 0.3–1)
MONOCYTES NFR BLD: 6.2 % (ref 4–15)
NEUTROPHILS # BLD AUTO: 5.1 K/UL (ref 1.8–7.7)
NEUTROPHILS NFR BLD: 83.1 % (ref 38–73)
NRBC BLD-RTO: 0 /100 WBC
PHOSPHATE SERPL-MCNC: 2.6 MG/DL (ref 2.7–4.5)
PLATELET # BLD AUTO: 141 K/UL (ref 150–350)
PMV BLD AUTO: 9.3 FL (ref 9.2–12.9)
POCT GLUCOSE: 114 MG/DL (ref 70–110)
POCT GLUCOSE: 123 MG/DL (ref 70–110)
POCT GLUCOSE: 93 MG/DL (ref 70–110)
POCT GLUCOSE: 94 MG/DL (ref 70–110)
POTASSIUM SERPL-SCNC: 4 MMOL/L (ref 3.5–5.1)
PROT SERPL-MCNC: 6.2 G/DL (ref 6–8.4)
RBC # BLD AUTO: 3.27 M/UL (ref 4.6–6.2)
SODIUM SERPL-SCNC: 139 MMOL/L (ref 136–145)
WBC # BLD AUTO: 6.13 K/UL (ref 3.9–12.7)

## 2020-01-08 PROCEDURE — 25000003 PHARM REV CODE 250: Performed by: STUDENT IN AN ORGANIZED HEALTH CARE EDUCATION/TRAINING PROGRAM

## 2020-01-08 PROCEDURE — 84100 ASSAY OF PHOSPHORUS: CPT

## 2020-01-08 PROCEDURE — 20600001 HC STEP DOWN PRIVATE ROOM

## 2020-01-08 PROCEDURE — 83735 ASSAY OF MAGNESIUM: CPT

## 2020-01-08 PROCEDURE — 85025 COMPLETE CBC W/AUTO DIFF WBC: CPT

## 2020-01-08 PROCEDURE — 63600175 PHARM REV CODE 636 W HCPCS: Performed by: INTERNAL MEDICINE

## 2020-01-08 PROCEDURE — 25000003 PHARM REV CODE 250: Performed by: SURGERY

## 2020-01-08 PROCEDURE — 63600175 PHARM REV CODE 636 W HCPCS: Performed by: STUDENT IN AN ORGANIZED HEALTH CARE EDUCATION/TRAINING PROGRAM

## 2020-01-08 PROCEDURE — 97535 SELF CARE MNGMENT TRAINING: CPT

## 2020-01-08 PROCEDURE — 63600175 PHARM REV CODE 636 W HCPCS: Performed by: SURGERY

## 2020-01-08 PROCEDURE — 92610 EVALUATE SWALLOWING FUNCTION: CPT

## 2020-01-08 PROCEDURE — 99223 PR INITIAL HOSPITAL CARE,LEVL III: ICD-10-PCS | Mod: ,,, | Performed by: INTERNAL MEDICINE

## 2020-01-08 PROCEDURE — 99223 1ST HOSP IP/OBS HIGH 75: CPT | Mod: ,,, | Performed by: INTERNAL MEDICINE

## 2020-01-08 PROCEDURE — 80053 COMPREHEN METABOLIC PANEL: CPT

## 2020-01-08 PROCEDURE — 92611 MOTION FLUOROSCOPY/SWALLOW: CPT

## 2020-01-08 RX ORDER — FLUCONAZOLE 2 MG/ML
200 INJECTION, SOLUTION INTRAVENOUS
Status: DISCONTINUED | OUTPATIENT
Start: 2020-01-09 | End: 2020-01-12

## 2020-01-08 RX ORDER — ENOXAPARIN SODIUM 100 MG/ML
40 INJECTION SUBCUTANEOUS EVERY 24 HOURS
Status: DISCONTINUED | OUTPATIENT
Start: 2020-01-08 | End: 2020-01-13 | Stop reason: HOSPADM

## 2020-01-08 RX ORDER — HYDROMORPHONE HYDROCHLORIDE 1 MG/ML
1 INJECTION, SOLUTION INTRAMUSCULAR; INTRAVENOUS; SUBCUTANEOUS 3 TIMES DAILY PRN
Status: DISCONTINUED | OUTPATIENT
Start: 2020-01-08 | End: 2020-01-09

## 2020-01-08 RX ORDER — HYDROMORPHONE HYDROCHLORIDE 1 MG/ML
1 INJECTION, SOLUTION INTRAMUSCULAR; INTRAVENOUS; SUBCUTANEOUS EVERY 4 HOURS PRN
Status: DISCONTINUED | OUTPATIENT
Start: 2020-01-08 | End: 2020-01-08

## 2020-01-08 RX ORDER — OXYCODONE HYDROCHLORIDE 10 MG/1
10 TABLET ORAL EVERY 4 HOURS PRN
Status: DISCONTINUED | OUTPATIENT
Start: 2020-01-08 | End: 2020-01-13 | Stop reason: HOSPADM

## 2020-01-08 RX ORDER — FLUCONAZOLE 2 MG/ML
400 INJECTION, SOLUTION INTRAVENOUS ONCE
Status: COMPLETED | OUTPATIENT
Start: 2020-01-08 | End: 2020-01-08

## 2020-01-08 RX ORDER — SODIUM CHLORIDE, SODIUM LACTATE, POTASSIUM CHLORIDE, CALCIUM CHLORIDE 600; 310; 30; 20 MG/100ML; MG/100ML; MG/100ML; MG/100ML
INJECTION, SOLUTION INTRAVENOUS CONTINUOUS
Status: DISCONTINUED | OUTPATIENT
Start: 2020-01-08 | End: 2020-01-12

## 2020-01-08 RX ADMIN — SODIUM CHLORIDE 500 ML: 0.9 INJECTION, SOLUTION INTRAVENOUS at 02:01

## 2020-01-08 RX ADMIN — FLUCONAZOLE 400 MG: 2 INJECTION, SOLUTION INTRAVENOUS at 01:01

## 2020-01-08 RX ADMIN — CARVEDILOL 3.12 MG: 3.12 TABLET, FILM COATED ORAL at 01:01

## 2020-01-08 RX ADMIN — Medication 10 ML: at 07:01

## 2020-01-08 RX ADMIN — ENOXAPARIN SODIUM 40 MG: 100 INJECTION SUBCUTANEOUS at 05:01

## 2020-01-08 RX ADMIN — HYDROMORPHONE HYDROCHLORIDE 1 MG: 1 INJECTION, SOLUTION INTRAMUSCULAR; INTRAVENOUS; SUBCUTANEOUS at 01:01

## 2020-01-08 RX ADMIN — Medication 10 ML: at 02:01

## 2020-01-08 RX ADMIN — Medication 10 ML: at 05:01

## 2020-01-08 RX ADMIN — HYDROMORPHONE HYDROCHLORIDE 1 MG: 1 INJECTION, SOLUTION INTRAMUSCULAR; INTRAVENOUS; SUBCUTANEOUS at 05:01

## 2020-01-08 RX ADMIN — CARVEDILOL 3.12 MG: 3.12 TABLET, FILM COATED ORAL at 08:01

## 2020-01-08 RX ADMIN — HYDROMORPHONE HYDROCHLORIDE 1 MG: 1 INJECTION, SOLUTION INTRAMUSCULAR; INTRAVENOUS; SUBCUTANEOUS at 08:01

## 2020-01-08 RX ADMIN — LEVOTHYROXINE SODIUM 50 MCG: 50 TABLET ORAL at 08:01

## 2020-01-08 RX ADMIN — Medication 10 ML: at 06:01

## 2020-01-08 RX ADMIN — SODIUM CHLORIDE, SODIUM LACTATE, POTASSIUM CHLORIDE, AND CALCIUM CHLORIDE: .6; .31; .03; .02 INJECTION, SOLUTION INTRAVENOUS at 10:01

## 2020-01-08 RX ADMIN — OXYCODONE HYDROCHLORIDE 10 MG: 10 TABLET ORAL at 06:01

## 2020-01-08 RX ADMIN — ONDANSETRON 4 MG: 2 INJECTION INTRAMUSCULAR; INTRAVENOUS at 10:01

## 2020-01-08 RX ADMIN — CARVEDILOL 3.12 MG: 3.12 TABLET, FILM COATED ORAL at 07:01

## 2020-01-08 RX ADMIN — OXYCODONE HYDROCHLORIDE 10 MG: 10 TABLET ORAL at 10:01

## 2020-01-08 RX ADMIN — Medication 10 ML: at 08:01

## 2020-01-08 NOTE — ED NOTES
Patient identifiers verified and correct for Mr Mendez  C/C: Vomiting, SEE NN  APPEARANCE: awake and alert in NAD. Sclera yellow  SKIN: warm, dry and intact. No breakdown or bruising.  MUSCULOSKELETAL: Patient moving all extremities spontaneously, no obvious swelling or deformities noted. Ambulates independently.  RESPIRATORY: Denies shortness of breath.Respirations unlabored. Denies fevers  CARDIAC: Denies CP, 2+ distal pulses; no peripheral edema  ABDOMen: Abdomen round, positive nausea, mult episodes of emesis, positive diarrhea x 2 today, burning to upper abdomen   : voids spontaneously, denies difficulty  Neurologic: AAO x 4; follows commands equal strength in all extremities; denies numbness/tingling. Denies dizziness Positive gen weakness, states unable to swallow, mult sores in mouth, yellow coating on tongue

## 2020-01-08 NOTE — NURSING
Pt arrives to floor via stretcher.  No distress noted.  Team made aware of pt's arrival to floor.  Pt oriented to room and call light.  Family at bedside.  Will continue to monitor pt.

## 2020-01-08 NOTE — PT/OT/SLP EVAL
Speech Language Pathology Evaluation  Bedside Swallow    Patient Name:  Jerrod Mendez   MRN:  [id number]   [id number]    Admitting Diagnosis: CINV (chemotherapy-induced nausea and vomiting)    Recommendations:                 General Recommendations:  Modified barium swallow study  Diet recommendations:   , Clears   Aspiration Precautions: Standard aspiration precautions   General Precautions: Standard,    Communication strategies:  none    History:     Past Medical History:   Diagnosis Date    Back pain     Brain aneurysm     Cancer     tonsils    Coronary artery disease     Diabetes mellitus     Gout     High cholesterol     Hypertension     Oropharyngeal dysphagia     JIM (obstructive sleep apnea)     Stroke     tia    TIA (transient ischemic attack)        Past Surgical History:   Procedure Laterality Date    CHOLECYSTECTOMY      CORONARY ANGIOPLASTY WITH STENT PLACEMENT      DISSECTION OF NECK Right 11/6/2019    Procedure: DISSECTION, NECK;  Surgeon: Stu Romano MD;  Location: [facility] OR [address] [place] [address];  Service: ENT;  Laterality: Right;    ESOPHAGOGASTRODUODENOSCOPY      FINE NEEDLE ASPIRATION Right     neck mass    GASTROSTOMY TUBE PLACEMENT      LIPOMA RESECTION      back    PEG TUBE REMOVAL N/A 5/28/2019    Procedure: REMOVAL, PEG TUBE;  Surgeon: Milton Friedman MD;  [facility];  Service: Endoscopy;  Laterality: N/A;    PORTACATH PLACEMENT       MD: HPI: Mr Mendez is a 65 YO male former smoker with stage 4B Right Tonsil cancer (diagnosed 11/19/20) currently on chemotherapy who presents with ongoing nausea and vomiting.   The patient has stage stage 4B R. 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  Plans for carbo/5FU/keytruda x 6 cycles, followed by maintenance 5FU. Most recently been on continuous chemo with pump since last Friday.   The  "patient reports this is his second round of chemo. The intial, he did not tolerate well. He reports he has had ongoing nausea/vomiting. In addition, has severe mouth pain due to mucositis. He is not able to toelrate po intake. He went to [facility], was discharged after receiving IV dilaudid, which helped his pain and allowed him to eat temporarily. He reports the symptoms returend, and he was advised by Dr Blount to come to the main [facility].   He denies any recent fevers, sick contacts, no unusual foods.   No abdominal pain. He reports the mucositis seems improved, he has difficulties swallowing and opening his mouth.     Chest CT: Two sub 5 mm right lung nodules, unchanged.  No new or enlarging lesions detected within the chest    Prior diet: Increased difficulty swallowing since Friday; previously on regular/thin      Subjective     Patient awake and sitting at EOB.   Patient states, "I've had a feeding tube before, and I don't want that. I'd rather deal with the pain."    Objective:     Oral Musculature Evaluation  · Oral Musculature: WFL  · Dentition: present and adequate  · Secretion Management: problems swallowing secretions  · Mucosal Quality: sticky, coated tongue(thrush)  · Lingual Strength and Mobility: WFL  · Volitional Cough: elicited  · Voice Prior to PO Intake: clear    Bedside Swallow Eval:   Consistencies Assessed:  · Thin liquids water sip x1  · Nectar thick liquids nectar thick water x1     Oral Phase:   · WFL    Pharyngeal Phase:   · coughing/choking  · delayed swallow initation  · multiple spontaneous swallows  · throat clearing    Compensatory Strategies  · None    Treatment: SLP provided patient education on recommendation for a MBSS, SLP recommendations, SLP role, s/s and risks of aspiration, and POC. Patient verbalized understanding. SLP communicated recommendations with MD team and RN.     Assessment:     Jerrod Mendez is a 64 y.o. male with an SLP diagnosis of Dysphagia. MBSS to be " completed this AM.     Goals:   Multidisciplinary Problems     SLP Goals        Problem: SLP Goal    Goal Priority Disciplines Outcome   SLP Goal     SLP    Description:  Speech Therapy Short Term Goals  Goal expected to be met by 1/22  1. Pt will participate in a Modified Barium Swallow Study to objectively r/o aspiration and determine the least restrictive and safest po diet.                       Plan:     · Patient to be seen:      · Plan of Care expires:     · Plan of Care reviewed with:  patient   · SLP Follow-Up:  Yes       Discharge recommendations:  (tbd)   Barriers to Discharge:  Level of Skilled Assistance Needed       Time Tracking:     SLP Treatment Date:   01/08/20  Speech Start Time:  1020  Speech Stop Time:  1043     Speech Total Time (min):  23 min    Billable Minutes: Eval Swallow and Oral Function 23    FLORENCIA Kat, CCC-SLP   Pager: [phone]  01/08/2020

## 2020-01-08 NOTE — PLAN OF CARE
Problem: SLP Goal  Goal: SLP Goal  Description  Speech Therapy Short Term Goals  Goal expected to be met by 1/22  1. Pt will participate in a Modified Barium Swallow Study to objectively r/o aspiration and determine the least restrictive and safest po diet. -MET  2. Patient will tolerate a mechanical soft diet and nectar thick liquids with swallowing strategies and precautions in place with minimal s/s of aspiration.   3. SLP will provide further education on all SLP recommendations, safe swallowing precautions, and strategies.   4. Patient will complete trial dysphagia therapy exercises x10 targeting BOT strength and epiglottic inversion.       Outcome: Ongoing, Progressing  See Modified Barium Swallow Study report. ST will continue to follow.   CARMEN Caputo., CCC-SLP  Pager: [phone]  01/08/2020

## 2020-01-08 NOTE — ASSESSMENT & PLAN NOTE
Mr Mendez is a 63 YO male former smoker with stage 4B Right Tonsil cancer (diagnosed 11/19/20) with recurrence at R. supraclavilcular node and unresectable SCC near IJV and skull base, currently on chemotherapy who presents with ongoing nausea and vomiting.     Assessment:  1. Chemotherapy related nausea/vomiting  2. Severe mucositis  3. Stage 4B tonsil cancer with recurrent disease    Plan:  - Supportive care. Additional hydration given  - Continue magic mouthwash and home pain meds.   - IV dilaudid prn for additional pain control  - Pending U/S for elevated TB

## 2020-01-08 NOTE — ED TRIAGE NOTES
Patient states vomiting all night and all day, Went to [facility] yesterday for IVF and dehydration. Last chemo per pump completed Friday. Positive diarrhea x2, Denies fevers. Denies blood in stool. Zofran last week, Phenergan at 1600

## 2020-01-08 NOTE — HPI
Mr Mendez is a 63 YO male former smoker with stage 4B Right Tonsil cancer (diagnosed 11/19/20) currently on chemotherapy who presents with ongoing nausea and vomiting.     The patient has stage stage 4B R. 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  Plans for carbo/5FU/keytruda x 6 cycles, followed by maintenance 5FU. Most recently been on continuous chemo with pump since last Friday.     The patient reports this is his second round of chemo. The intial, he did not tolerate well. He reports he has had ongoing nausea/vomiting. In addition, has severe mouth pain due to mucositis. He is not able to toelrate po intake. He went to [facility], was discharged after receiving IV dilaudid, which helped his pain and allowed him to eat temporarily. He reports the symptoms returend, and he was advised by Dr Blount to come to the main [facility].     He denies any recent fevers, sick contacts, no unusual foods.   No abdominal pain. He reports the mucositis seems improved, he has difficulties swallowing and opening his mouth.       Oncologic History:    Diagnosis:  1. 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 Recurrent disease to the right supraclavicular LN found on 10/4/2019.      1. Mr Mendez is a 63 yo man with locally advanced R tonsillar cancer s/p chemoradiation. [place] [facility] He presented with a lump behind his right ear in Nov 2018. Laryngoscopy on 11/12/18 showed fullness in the right tonsil. CT neck on 11/13/2018 showed a 4.6 x 3.4 x 6.6 cm mass with areas of necrosis medial to the right parotid gland and extending inferior and posterior to the submandibular and posterior to the sternocleidomastoid compressing the jugular vein. Prominent adjacent posterior jugular nodes on the right 1.8 cm. Prominence of the soft tissue which showed  "asmymetric in the region of the right oropharynx without a discrete mass. Compression of the jugular vein and internal carotid artery by the mass. Prominent left posterior jugular node 1.4 cm. US guided FNA on 11/19/2018 showed squamous cell carcinoma, unable to test HPV. He was at the ER on 11/22/18 for trouble speaking. MRA brain/neck and MRI brain was unremarkable. It was felt that he had a TIA. PET scan on 12/10/18 showed Hypermetabolic right tonsillar mass with multiple metastatic right cervical lymph nodes, and single metastatic left cervical node.  No definite evidence of metastatic disease within the chest, abdomen or pelvis. Punctate nodular densities within the right upper lobe, questionable 4 mm cavitary nodule.    2. He was treated with concurrent chemoradiation with high dose cisplatin 100 mg/m2. He received two doses of cisplatin. Did not get dose 3 because of kidney failure. Completed radiation in Feb 2019.   3. Surveillance CT scan on 10/4/19 showed R supraclavicular lymphadenopathy which is new. That is when I saw him the first time in the clinic. We discussed that this is recurrent/stage IV tonsil cancer. Discussed with Dr Romano who feels the supraclavicular LAD is resectable. RT was deemed not an option by Dr Rodriguez. He was referred to Dr Romano. He underwent Modified neck dissection involving levels 2 through 5 with sacrifice of the spinal accessory nerve and ligation of the right internal jugular vein on 11/6/2019. Post-op note: "Superiorly (of the internal jugular vein), at the level of the skull base there was noted to be tissue in this region worrisome for viable tumor.  This was sampled and sent to pathology and confirmed to be consistent with squamous cell carcinoma.  Given the location of this tumor, affected breech the deep cervical fascia and the need to resect the carotid artery and vagus nerve in order to clear it, we had cleared his disease unresectable."  4. Pathology: 1) Tissue at " "right skull base, biopsy: Invasive squamous cell carcinoma, nonkeratinizing with basaloid features. 2) Right neck, level 5, dissection: Invasive squamous cell carcinoma, nonkeratinizing with basaloid features, involving fibroadipose tissues (5.0 cm metastatic deposit). One separate small lymph node negative for malignancy (0/1).  5. Patient had draining postop neck wound 2/2 seroma vs Chyle leak. Was seen by Dr Romano.   6. PET scan on 12/23/19 showed "hypermetabolic 16 x 9 mm lesion in the region of the right carotid space at the level of C2 with SUV max 6.07 on image 63.  There are also 3-4 hypermetabolic right level 4 lymph nodes measuring up to 1.5 cm in short axis on image 90 with an SUV of 9.1 for the conglomerate.  There are postsurgical changes in the surrounding right neck including surgical clips and a focal gas collection without associated fluid.  There is no discrete hypermetabolic activity at the skull base"  "

## 2020-01-08 NOTE — ED PROVIDER NOTES
Encounter Date: 1/7/2020       History     Chief Complaint   Patient presents with    Emesis     patient finished chemo on Thursday and has been vomiting ever since intermittently.  Patient reports two episodes of diarrhea today as well     Mr. Mendez is a 64-year-old male history of tonsillar CA currently on chemo brought in by wife for evaluation of possible dehydration.  Wife states that patient was started on a chemo pump on Friday, shortly after started experiencing nausea and vomiting on Saturday that has been constant until today.  Symptoms progressed and therefore patient was [facility].  There he was treated with IV fluids and nausea medicine and ultimately discharged.  Patient states he has only had a small sip of water today which he ultimately vomited.  Wife states patent has been using Magic mouthwash without relief.  After further discussion of his symptoms with Dr. Blount, he was referred to the emergency department for admission.  He does currently complain of weakness and fatigue, denies fever, chills, chest pain, cough        Review of patient's allergies indicates:  No Known Allergies  Past Medical History:   Diagnosis Date    Back pain     Brain aneurysm     Cancer     tonsils    Coronary artery disease     Diabetes mellitus     Gout     High cholesterol     Hypertension     Oropharyngeal dysphagia     JIM (obstructive sleep apnea)     Stroke     tia    TIA (transient ischemic attack)      Past Surgical History:   Procedure Laterality Date    CHOLECYSTECTOMY      CORONARY ANGIOPLASTY WITH STENT PLACEMENT      DISSECTION OF NECK Right 11/6/2019    Procedure: DISSECTION, NECK;  Surgeon: Stu Romano MD;  Location: [facility] OR [address] [place] [address];  Service: ENT;  Laterality: Right;    ESOPHAGOGASTRODUODENOSCOPY      FINE NEEDLE ASPIRATION Right     neck mass    GASTROSTOMY TUBE PLACEMENT      LIPOMA RESECTION      back    PEG TUBE REMOVAL N/A 5/28/2019     Procedure: REMOVAL, PEG TUBE;  Surgeon: Milton Friedman MD;  Location: [facility];  Service: Endoscopy;  Laterality: N/A;    PORTACATH PLACEMENT       Family History   Problem Relation Age of Onset    Diabetes Mother     Hypertension Mother     Cancer Father     Heart attack Father     Heart disease Paternal Grandmother      Social History     Tobacco Use    Smoking status: Former Smoker     Types: Cigarettes     Last attempt to quit:      Years since quittin.0    Smokeless tobacco: Former User    Tobacco comment: quit     Substance Use Topics    Alcohol use: Yes     Comment: one beer a month    Drug use: No     Review of Systems   Constitutional: Negative for chills and fever.   HENT: Positive for mouth sores, sore throat and trouble swallowing. Negative for congestion, facial swelling, rhinorrhea, sinus pressure and voice change.    Eyes: Negative for visual disturbance.   Respiratory: Negative for cough and shortness of breath.    Cardiovascular: Positive for leg swelling (trace edema). Negative for chest pain and palpitations.   Gastrointestinal: Negative for abdominal pain, nausea and vomiting.   Endocrine: Negative for cold intolerance.   Genitourinary: Negative for hematuria.   Musculoskeletal: Negative for back pain and joint swelling.   Skin: Negative for pallor.   Hematological: Does not bruise/bleed easily.       Physical Exam     Initial Vitals [20 1910]   BP Pulse Resp Temp SpO2   (!) 168/82 93 16 98.4 °F (36.9 °C) 96 %      MAP       --         Physical Exam    Nursing note and vitals reviewed.  Constitutional: He appears well-developed. No distress.   HENT:   (+) thick white plague on tongue with scattered sore on the hard and soft palate.     Eyes: Conjunctivae are normal. No scleral icterus.   Neck: Neck supple. No tracheal deviation present. No JVD present.   Firmness of the submental area without overlying cellulitis.   Cardiovascular: Normal rate, regular rhythm  and normal heart sounds.   Pulmonary/Chest: Breath sounds normal. No stridor. He has no wheezes. He has no rales.   Abdominal: Soft. Bowel sounds are normal.   Musculoskeletal: Normal range of motion. He exhibits no edema.   Lymphadenopathy:     He has no cervical adenopathy.   Neurological: He is alert.   Skin: Skin is warm. Capillary refill takes less than 2 seconds. No rash noted.   Psychiatric: He has a normal mood and affect.         ED Course   Procedures  Labs Reviewed   CBC W/ AUTO DIFFERENTIAL - Abnormal; Notable for the following components:       Result Value    RBC 3.72 (*)     Hemoglobin 11.4 (*)     Hematocrit 35.5 (*)     Immature Granulocytes 0.7 (*)     Gran # (ANC) 8.4 (*)     Immature Grans (Abs) 0.07 (*)     Lymph # 0.6 (*)     Gran% 87.7 (*)     Lymph% 5.7 (*)     All other components within normal limits   COMPREHENSIVE METABOLIC PANEL - Abnormal; Notable for the following components:    Glucose 122 (*)     BUN, Bld 30 (*)     Creatinine 2.0 (*)     Total Bilirubin 3.0 (*)     eGFR if  40.2 (*)     eGFR if non  34.7 (*)     All other components within normal limits   LIPASE          Imaging Results    None          Medical Decision Making:   History:   I obtained history from: someone other than patient.       <> Summary of History: Wife at bedside  Old Medical Records: I decided to obtain old medical records.  Old Records Summarized: records from previous admission(s).  Initial Assessment:   Emergent evaluation a 64-year-old male presenting with persistent nausea and vomiting after starting chemo.  Differential Diagnosis:   Differential includes but not limited to electrolyte derangement, acute kidney injury, dehydration, gastritis, pancreatitis  Clinical Tests:   Lab Tests: Ordered and Reviewed  ED Management:  -  Labs  - IVF  - dilaudid IV  - Oncology consult    Case discussed with Oncology, will admit for pain control, IV fluids.  They are requesting right  upper quadrant ultrasound secondary to hyperbilirubinemia.  Orders placed and will be followed by admitting team.  Other:   I have discussed this case with another health care provider.                                 Clinical Impression:       ICD-10-CM ICD-9-CM   1. Hyperbilirubinemia E80.6 782.4   2. Vomiting, intractability of vomiting not specified, presence of nausea not specified, unspecified vomiting type R11.10 787.03   3. Mucositis K12.30 528.00   4. Elevated bilirubin R17 277.4                             Nargis Tee MD  01/07/20 [id number]

## 2020-01-08 NOTE — PLAN OF CARE
Patient AAO today, independent with ambulation. Seems down due to recent news of recurrent tonsil cancer. Received chemo, now here for supportive care.  Receiving IV fluids and dilaudid for throat and mouth pain. Started on dukes solution and receiving magic mouthwash.  Vitals stable. T bili elevated. Difficulty swallowing, barium swallow study performed today. Diet changed to mechanical soft with nectar thick. Patients wife at the bedside, very attentive to patient.

## 2020-01-08 NOTE — SUBJECTIVE & OBJECTIVE
Patient information was obtained from patient and ER records.         Medications Prior to Admission   Medication Sig Dispense Refill Last Dose    aspirin 81 MG Chew Take 81 mg by mouth once daily. Coated ASA   1/7/2020    carvedilol (COREG) 6.25 MG tablet Take half tab a day for 1 week then take 1 tab daily thereafter (Patient taking differently: Take 3.125 mg by mouth 2 (two) times daily. Take half tab a day for 1 week then take 1 tab daily thereafter) 60 tablet 11 1/7/2020    dronabinol (MARINOL) 2.5 MG capsule Take 1 capsule (2.5 mg total) by mouth 2 (two) times daily before meals. 60 capsule 0 1/7/2020    glipiZIDE (GLUCOTROL) 5 MG tablet Take 10 mg by mouth daily with breakfast.    1/7/2020    HYDROcodone-acetaminophen (NORCO) 5-325 mg per tablet Take 1 tablet by mouth every 4 (four) hours as needed. 30 tablet 0 1/7/2020    levothyroxine (SYNTHROID) 50 MCG tablet Take 50 mcg by mouth once daily.  3 1/7/2020    ondansetron (ZOFRAN-ODT) 4 MG TbDL Take 1 tablet (4 mg total) by mouth every 6 (six) hours as needed (for nausea). 60 tablet 0 Past Week    pioglitazone (ACTOS) 45 MG tablet Take 45 mg by mouth once daily.   1/7/2020    promethazine (PHENERGAN) 25 MG tablet Take 1 tablet (25 mg total) by mouth every 6 (six) hours as needed for Nausea. 60 tablet 2 1/7/2020    simvastatin (ZOCOR) 40 MG tablet Take 40 mg by mouth every evening.   1/7/2020    indomethacin (INDOCIN) 50 MG capsule indomethacin 50 mg capsule   TAKE ONE CAPSULE BY MOUTH 3 TIMES A DAY AS NEEDED FOR 5 DAYS   Taking    linezolid (ZYVOX) 600 mg Tab linezolid 600 mg tablet   Take 1 tablet every 12 hours by oral route as directed for 7 days.   Not Taking    magic mouthwash diphen/antac/lidoc Swish and spit 15 ml by mouth every 4 hours as needed (Patient not taking: Reported on 12/24/2019) 600 mL 2 Not Taking    morphine 10 mg/5 mL solution Take 2.5 mLs (5 mg total) by mouth every 3 (three) hours as needed for Pain. 500 mL 0 Unknown     nitroGLYCERIN (NITROSTAT) 0.3 MG SL tablet Place 0.3 mg under the tongue every 5 (five) minutes as needed for Chest pain.   Taking       Patient has no known allergies.     Past Medical History:   Diagnosis Date    Back pain     Brain aneurysm     Cancer     tonsils    Coronary artery disease     Diabetes mellitus     Gout     High cholesterol     Hypertension     Oropharyngeal dysphagia     JIM (obstructive sleep apnea)     Stroke     tia    TIA (transient ischemic attack)      Past Surgical History:   Procedure Laterality Date    CHOLECYSTECTOMY      CORONARY ANGIOPLASTY WITH STENT PLACEMENT      DISSECTION OF NECK Right 2019    Procedure: DISSECTION, NECK;  Surgeon: Stu Romano MD;  Location: [facility] OR [address] [place] [address];  Service: ENT;  Laterality: Right;    ESOPHAGOGASTRODUODENOSCOPY      FINE NEEDLE ASPIRATION Right     neck mass    GASTROSTOMY TUBE PLACEMENT      LIPOMA RESECTION      back    PEG TUBE REMOVAL N/A 2019    Procedure: REMOVAL, PEG TUBE;  Surgeon: Milton Friedman MD;  Location: [facility];  Service: Endoscopy;  Laterality: N/A;    PORTACATH PLACEMENT       Family History     Problem Relation (Age of Onset)    Cancer Father    Diabetes Mother    Heart attack Father    Heart disease Paternal Grandmother    Hypertension Mother        Tobacco Use    Smoking status: Former Smoker     Types: Cigarettes     Last attempt to quit: 2008     Years since quittin.0    Smokeless tobacco: Former User    Tobacco comment: quit     Substance and Sexual Activity    Alcohol use: Yes     Comment: one beer a month    Drug use: No    Sexual activity: Not on file     Comment:        Review of Systems   Constitutional: Positive for appetite change. Negative for chills and fever.   HENT: Negative for sore throat.    Eyes: Negative for visual disturbance.   Respiratory: Negative for shortness of breath.    Cardiovascular: Negative for chest pain.   Gastrointestinal:  Positive for diarrhea, nausea and vomiting. Negative for abdominal pain and constipation.   Genitourinary: Negative for difficulty urinating.   Musculoskeletal: Negative for arthralgias.   Skin: Negative for rash.   Neurological: Negative for syncope.   Hematological: Does not bruise/bleed easily.     Objective:     Vital Signs (Most Recent):  Temp: 98.6 °F (37 °C) (01/08/20 0038)  Pulse: 82 (01/08/20 0038)  Resp: 18 (01/08/20 0038)  BP: 136/86 (01/08/20 0038)  SpO2: 95 % (01/08/20 0038) Vital Signs (24h Range):  Temp:  [98.4 °F (36.9 °C)-98.6 °F (37 °C)] 98.6 °F (37 °C)  Pulse:  [82-93] 82  Resp:  [16-18] 18  SpO2:  [95 %-96 %] 95 %  BP: (133-168)/(82-86) 136/86        There is no height or weight on file to calculate BMI.  There is no height or weight on file to calculate BSA.    ECOG SCORE         [unfilled]    Lines/Drains/Airways     Central Venous Catheter Line                 Port A Cath Single Lumen 12/14/18 0800 389 days                Physical Exam   Constitutional: He is oriented to person, place, and time. No distress.   HENT:   Head: Atraumatic.   Eyes: Pupils are equal, round, and reactive to light. EOM are normal.   Neck: No JVD present.   Cardiovascular: Normal rate and regular rhythm.   No murmur heard.  Pulmonary/Chest: Effort normal and breath sounds normal. No respiratory distress. He has no wheezes.   Abdominal: Soft. Bowel sounds are normal. He exhibits no distension. There is no tenderness.   Musculoskeletal:   Port on left, C/D/I   Neurological: He is alert and oriented to person, place, and time. No cranial nerve deficit.   Skin: No rash noted.   Psychiatric: He has a normal mood and affect.   Nursing note and vitals reviewed.      Significant Labs:   CBC:   Recent Labs   Lab 01/07/20 2003   WBC 9.58   HGB 11.4*   HCT 35.5*       and CMP:   Recent Labs   Lab 01/07/20 2003      K 4.2      CO2 23   *   BUN 30*   CREATININE 2.0*   CALCIUM 10.0   PROT 7.3   ALBUMIN 3.7    BILITOT 3.0*   ALKPHOS 77   AST 10   ALT 10   ANIONGAP 14   EGFRNONAA 34.7*       Diagnostic Results:  I have reviewed and interpreted all pertinent imaging results/findings within the past 24 hours.

## 2020-01-08 NOTE — PROCEDURES
Modified Barium Swallow    Patient Name:  Jerrod Mendez   MRN:  [id number]   [id number]      Recommendations:     Recommendations:                General Recommendations:  Dysphagia therapy, Consider Registered Dietician consult and possible nutritional supplement 2/2 concern for adequate po intake  Diet recommendations:  Mechanical soft, Nectar Thick Crushed medications in puree  · Strategies:    · Intermittent throat clears   · Multiple swallows with chin tuck every bite/sip   · Aspiration Precautions:   · Excellent and frequent oral care   · Upright for all intake and 30 minutes after    · Intermittent throat clears and pull up residue if feeling stuck in throat   · Patient okay for single sips of non-thickened water after oral care  General Precautions: Standard, aspiration, nectar thick  Communication strategies:  none    Referral     Reason for Referral  Patient was referred for a Modified Barium Swallow Study to assess the efficiency of his/her swallow function, rule out aspiration and make recommendations regarding safe dietary consistencies, effective compensatory strategies, and safe eating environment.     Diagnosis: Mucositis       History:     Past Medical History:   Diagnosis Date    Back pain     Brain aneurysm     Cancer     tonsils    Coronary artery disease     Diabetes mellitus     Gout     High cholesterol     Hypertension     Oropharyngeal dysphagia     JIM (obstructive sleep apnea)     Stroke     tia    TIA (transient ischemic attack)        Objective:     Current Respiratory Status: 01/08/20    Alert: yes    Cooperative: yes    Follows Directions: yes    Visualization  · Patient was seen in the lateral view    Oral Peripheral Examination  Oral Musculature: WFL  Dentition: present and adequate  Secretion Management: problems swallowing secretions  Mucosal Quality: sticky, coated tongue(thrush)  Lingual Strength and Mobility: WFL  Volitional Cough: elicited  Voice Prior to PO  Intake: clear    Procedure:   Pt was pleasant , cooperative and willingly accepted all trials    Consistencies Assessed   Pt was offered sips of thin liquids x2  Pt was offered sips of nectar thick liquid via cup  Pt was offered teaspoon bite of apple sauce x1  Pt was offered solid consistency coated with barium paste x1  Pt was offered a barium tablet buried in apple sauce x1    Oral Preparation / Oral Phase   WFL - Pt with adequate bolus acceptance , containment, control and timely A-P transfer across consistencies   Trace premature spillage of thin liquid along BOT to the vallecula   No presence of naso-pharyngeal reflux  Pharyngeal Phase   Pt essentially timely swallow initiation for age  Pt with reduced BOT retraction and incomplete epiglottic inversion patterns resulting in moderate to severe residue in the vallecula with all trials and from the vallecula to the piriform sinuses as trials continued. Multiple swallows with a chin tuick reduce (but does not clear) residue.   Pt with adequate hyolaryngeal elevation and excursion patterns  Patient presents with penetration after the swallow of thin liquids and during the swallow of other trials. Penetrated material not noted to hit the vocal chords with nectar thick, puree, and solid trials. Cued throat clear successful in clearing penetrated material from the laryngeal vestibule.   Patient presents with aspiration of thin liquids with chin tuck+head turn positioning. Unable to visualize if aspiration occurred before, during, or after the swallow. Patient presented with immediate coughing/choking s/p aspiration event.  Patient reporting currently taking 5 whole medication at once, so a barium table was tested. Barium tablet buried in apple sauce remained in the vallecula and immediate coughing/choking noted. Patient expectorated and spit out apple sauce, however, tablet remained in the vallecula. Patient no longer felt tablet in vallecula. Given cues to clear  throat and pull tablet back into oral cavity, patient able to expectorate tablet.   Pt warranted cued throat clear to eject material from laryngeal vestibule    Cervical Esophageal Phase    UES appeared to accommodate all bolus types without stasis or retrograde movement observed       Assessment:     Impressions   Pt with a moderate pharyngeal dysphagia c/b decreased BOT retraction, incomplete epiglottic inversion, and decreased overall swallow pressure resulting in aspiration of thin liquids and penetration of all further trials.    Prognosis: Guarded     Plan/Education   Results were discussed with patient. SLP returned to Mr. Mendez room to review MBSS. MBSS images showed to patient on computer in room. SLP provided education on MBSS results, SLP recommendations, importance of oral care, importance of nutritional supplement, SLP role, s/s and risks of aspiration, safe swallow precautions, strategies, and POC. SLP provided patient with a handout listing recommendations, strategies, and precautions. Continued risk of aspiration of all po intake discussed. Patient reports desire to continue oral diet despite risks. Patient verbalized understanding of all discussed and is in agreement with POC.     Results were discussed with Medical Team who was in agreement with plan     Goals:   Multidisciplinary Problems     SLP Goals        Problem: SLP Goal    Goal Priority Disciplines Outcome   SLP Goal     SLP    Description:  Speech Therapy Short Term Goals  Goal expected to be met by 1/22  1. Pt will participate in a Modified Barium Swallow Study to objectively r/o aspiration and determine the least restrictive and safest po diet. -MET  2. Patient will tolerate a mechanical soft diet and nectar thick liquids with swallowing strategies and precautions in place with minimal s/s of aspiration.   3. SLP will provide further education on all SLP recommendations, safe swallowing precautions, and strategies.   4. Patient will  complete trial dysphagia therapy exercises x10 targeting BOT strength and epiglottic inversion.                        Plan:   · Patient to be seen:  Therapy Frequency: 4 x/week   · Plan of Care expires:     · Plan of Care reviewed with:  patient        Discharge recommendations:  outpatient speech therapy     Time Tracking:   SLP Treatment Date:   01/08/20  Speech Start Time:  (4780-8354)  Speech Stop Time:  (1295-0408)     Speech Total Time (min):  23 min    FLORENCIA Kat, CCC-SLP   Pager: [phone]  01/08/2020

## 2020-01-08 NOTE — H&P
[facility] Medical Oncology  H&P    Subjective:     Principal Problem: CINV (chemotherapy-induced nausea and vomiting)    HPI: Mr Mendez is a 63 YO male former smoker with stage 4B Right Tonsil cancer (diagnosed 11/19/20) currently on chemotherapy who presents with ongoing nausea and vomiting.     The patient has stage stage 4B R. 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  Plans for carbo/5FU/keytruda x 6 cycles, followed by maintenance 5FU. Most recently been on continuous chemo with pump since last Friday.     The patient reports this is his second round of chemo. The intial, he did not tolerate well. He reports he has had ongoing nausea/vomiting. In addition, has severe mouth pain due to mucositis. He is not able to toelrate po intake. He went to [facility], was discharged after receiving IV dilaudid, which helped his pain and allowed him to eat temporarily. He reports the symptoms returend, and he was advised by Dr Blount to come to the main [facility].     He denies any recent fevers, sick contacts, no unusual foods.   No abdominal pain. He reports the mucositis seems improved, he has difficulties swallowing and opening his mouth.       Oncologic History:    Diagnosis:  1. 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 Recurrent disease to the right supraclavicular LN found on 10/4/2019.      1. Mr Mendez is a 65 yo man with locally advanced R tonsillar cancer s/p chemoradiation. He was previously treated at [facility]. He presented with a lump behind his right ear in Nov 2018. Laryngoscopy on 11/12/18 showed fullness in the right tonsil. CT neck on 11/13/2018 showed a 4.6 x 3.4 x 6.6 cm mass with areas of necrosis medial to the right parotid gland and extending inferior and posterior to the submandibular and posterior to the  "sternocleidomastoid compressing the jugular vein. Prominent adjacent posterior jugular nodes on the right 1.8 cm. Prominence of the soft tissue which showed asmymetric in the region of the right oropharynx without a discrete mass. Compression of the jugular vein and internal carotid artery by the mass. Prominent left posterior jugular node 1.4 cm. US guided FNA on 11/19/2018 showed squamous cell carcinoma, unable to test HPV. He was at the ER on 11/22/18 for trouble speaking. MRA brain/neck and MRI brain was unremarkable. It was felt that he had a TIA. PET scan on 12/10/18 showed Hypermetabolic right tonsillar mass with multiple metastatic right cervical lymph nodes, and single metastatic left cervical node.  No definite evidence of metastatic disease within the chest, abdomen or pelvis. Punctate nodular densities within the right upper lobe, questionable 4 mm cavitary nodule.    2. He was treated with concurrent chemoradiation with high dose cisplatin 100 mg/m2. He received two doses of cisplatin. Did not get dose 3 because of kidney failure. Completed radiation in Feb 2019.   3. Surveillance CT scan on 10/4/19 showed R supraclavicular lymphadenopathy which is new. That is when I saw him the first time in the clinic. We discussed that this is recurrent/stage IV tonsil cancer. Discussed with Dr Romano who feels the supraclavicular LAD is resectable. RT was deemed not an option by Dr Rodriguez. He was referred to Dr Romano. He underwent Modified neck dissection involving levels 2 through 5 with sacrifice of the spinal accessory nerve and ligation of the right internal jugular vein on 11/6/2019. Post-op note: "Superiorly (of the internal jugular vein), at the level of the skull base there was noted to be tissue in this region worrisome for viable tumor.  This was sampled and sent to pathology and confirmed to be consistent with squamous cell carcinoma.  Given the location of this tumor, affected breech the deep cervical " "fascia and the need to resect the carotid artery and vagus nerve in order to clear it, we had cleared his disease unresectable."  4. Pathology: 1) Tissue at right skull base, biopsy: Invasive squamous cell carcinoma, nonkeratinizing with basaloid features. 2) Right neck, level 5, dissection: Invasive squamous cell carcinoma, nonkeratinizing with basaloid features, involving fibroadipose tissues (5.0 cm metastatic deposit). One separate small lymph node negative for malignancy (0/1).  5. Patient had draining postop neck wound 2/2 seroma vs Chyle leak. Was seen by Dr Romano.   6. PET scan on 12/23/19 showed "hypermetabolic 16 x 9 mm lesion in the region of the right carotid space at the level of C2 with SUV max 6.07 on image 63.  There are also 3-4 hypermetabolic right level 4 lymph nodes measuring up to 1.5 cm in short axis on image 90 with an SUV of 9.1 for the conglomerate.  There are postsurgical changes in the surrounding right neck including surgical clips and a focal gas collection without associated fluid.  There is no discrete hypermetabolic activity at the skull base"    Patient information was obtained from patient and ER records.         Medications Prior to Admission   Medication Sig Dispense Refill Last Dose    aspirin 81 MG Chew Take 81 mg by mouth once daily. Coated ASA   1/7/2020    carvedilol (COREG) 6.25 MG tablet Take half tab a day for 1 week then take 1 tab daily thereafter (Patient taking differently: Take 3.125 mg by mouth 2 (two) times daily. Take half tab a day for 1 week then take 1 tab daily thereafter) 60 tablet 11 1/7/2020    dronabinol (MARINOL) 2.5 MG capsule Take 1 capsule (2.5 mg total) by mouth 2 (two) times daily before meals. 60 capsule 0 1/7/2020    glipiZIDE (GLUCOTROL) 5 MG tablet Take 10 mg by mouth daily with breakfast.    1/7/2020    HYDROcodone-acetaminophen (NORCO) 5-325 mg per tablet Take 1 tablet by mouth every 4 (four) hours as needed. 30 tablet 0 1/7/2020    " levothyroxine (SYNTHROID) 50 MCG tablet Take 50 mcg by mouth once daily.  3 1/7/2020    ondansetron (ZOFRAN-ODT) 4 MG TbDL Take 1 tablet (4 mg total) by mouth every 6 (six) hours as needed (for nausea). 60 tablet 0 Past Week    pioglitazone (ACTOS) 45 MG tablet Take 45 mg by mouth once daily.   1/7/2020    promethazine (PHENERGAN) 25 MG tablet Take 1 tablet (25 mg total) by mouth every 6 (six) hours as needed for Nausea. 60 tablet 2 1/7/2020    simvastatin (ZOCOR) 40 MG tablet Take 40 mg by mouth every evening.   1/7/2020    indomethacin (INDOCIN) 50 MG capsule indomethacin 50 mg capsule   TAKE ONE CAPSULE BY MOUTH 3 TIMES A DAY AS NEEDED FOR 5 DAYS   Taking    linezolid (ZYVOX) 600 mg Tab linezolid 600 mg tablet   Take 1 tablet every 12 hours by oral route as directed for 7 days.   Not Taking    magic mouthwash diphen/antac/lidoc Swish and spit 15 ml by mouth every 4 hours as needed (Patient not taking: Reported on 12/24/2019) 600 mL 2 Not Taking    morphine 10 mg/5 mL solution Take 2.5 mLs (5 mg total) by mouth every 3 (three) hours as needed for Pain. 500 mL 0 Unknown    nitroGLYCERIN (NITROSTAT) 0.3 MG SL tablet Place 0.3 mg under the tongue every 5 (five) minutes as needed for Chest pain.   Taking       Patient has no known allergies.     Past Medical History:   Diagnosis Date    Back pain     Brain aneurysm     Cancer     tonsils    Coronary artery disease     Diabetes mellitus     Gout     High cholesterol     Hypertension     Oropharyngeal dysphagia     JIM (obstructive sleep apnea)     Stroke     tia    TIA (transient ischemic attack)      Past Surgical History:   Procedure Laterality Date    CHOLECYSTECTOMY      CORONARY ANGIOPLASTY WITH STENT PLACEMENT      DISSECTION OF NECK Right 11/6/2019    Procedure: DISSECTION, NECK;  Surgeon: Stu Romano MD;  [facility] OR [address] [place] [address];  Service: ENT;  Laterality: Right;    ESOPHAGOGASTRODUODENOSCOPY      FINE NEEDLE ASPIRATION  Right     neck mass    GASTROSTOMY TUBE PLACEMENT      LIPOMA RESECTION      back    PEG TUBE REMOVAL N/A 2019    Procedure: REMOVAL, PEG TUBE;  Surgeon: Milton Friedman MD;  Location: [facility];  Service: Endoscopy;  Laterality: N/A;    PORTACATH PLACEMENT       Family History     Problem Relation (Age of Onset)    Cancer Father    Diabetes Mother    Heart attack Father    Heart disease Paternal Grandmother    Hypertension Mother        Tobacco Use    Smoking status: Former Smoker     Types: Cigarettes     Last attempt to quit:      Years since quittin.0    Smokeless tobacco: Former User    Tobacco comment: quit     Substance and Sexual Activity    Alcohol use: Yes     Comment: one beer a month    Drug use: No    Sexual activity: Not on file     Comment:        Review of Systems   Constitutional: Positive for appetite change. Negative for chills and fever.   HENT: Negative for sore throat.    Eyes: Negative for visual disturbance.   Respiratory: Negative for shortness of breath.    Cardiovascular: Negative for chest pain.   Gastrointestinal: Positive for diarrhea, nausea and vomiting. Negative for abdominal pain and constipation.   Genitourinary: Negative for difficulty urinating.   Musculoskeletal: Negative for arthralgias.   Skin: Negative for rash.   Neurological: Negative for syncope.   Hematological: Does not bruise/bleed easily.     Objective:     Vital Signs (Most Recent):  Temp: 98.6 °F (37 °C) (20)  Pulse: 82 (20)  Resp: 18 (20)  BP: 136/86 (20)  SpO2: 95 % (20) Vital Signs (24h Range):  Temp:  [98.4 °F (36.9 °C)-98.6 °F (37 °C)] 98.6 °F (37 °C)  Pulse:  [82-93] 82  Resp:  [16-18] 18  SpO2:  [95 %-96 %] 95 %  BP: (133-168)/(82-86) 136/86        There is no height or weight on file to calculate BMI.  There is no height or weight on file to calculate BSA.    ECOG SCORE         [unfilled]    Lines/Drains/Airways      Central Venous Catheter Line                 Port A Cath Single Lumen 12/14/18 0800 [id number] days                Physical Exam   Constitutional: He is oriented to person, place, and time. No distress.   HENT:   Head: Atraumatic.   Eyes: Pupils are equal, round, and reactive to light. EOM are normal.   Neck: No JVD present.   Cardiovascular: Normal rate and regular rhythm.   No murmur heard.  Pulmonary/Chest: Effort normal and breath sounds normal. No respiratory distress. He has no wheezes.   Abdominal: Soft. Bowel sounds are normal. He exhibits no distension. There is no tenderness.   Musculoskeletal:   Port on left, C/D/I   Neurological: He is alert and oriented to person, place, and time. No cranial nerve deficit.   Skin: No rash noted.   Psychiatric: He has a normal mood and affect.   Nursing note and vitals reviewed.      Significant Labs:   CBC:   Recent Labs   Lab 01/07/20 2003   WBC 9.58   HGB 11.4*   HCT 35.5*       and CMP:   Recent Labs   Lab 01/07/20 2003      K 4.2      CO2 23   *   BUN 30*   CREATININE 2.0*   CALCIUM 10.0   PROT 7.3   ALBUMIN 3.7   BILITOT 3.0*   ALKPHOS 77   AST 10   ALT 10   ANIONGAP 14   EGFRNONAA 34.7*       Diagnostic Results:  I have reviewed and interpreted all pertinent imaging results/findings within the past 24 hours.    Assessment/Plan:     * CINV (chemotherapy-induced nausea and vomiting)  Mr Mendez is a 65 YO male former smoker with stage 4B Right Tonsil cancer (diagnosed 11/19/20) with recurrence at R. supraclavilcular node and unresectable SCC near IJV and skull base, currently on chemotherapy who presents with ongoing nausea and vomiting.     Assessment:  1. Chemotherapy related nausea/vomiting  2. Severe mucositis  3. Stage 4B tonsil cancer with recurrent disease    Plan:  - Supportive care. Additional hydration given  - Continue magic mouthwash and home pain meds.   - IV dilaudid prn for additional pain control  - Pending U/S for elevated  TB    Mucositis  See above    Hyperbilirubinemia  See above     CKD (chronic kidney disease) stage 3, GFR 30-59 ml/min  Renal function at baseline    Type 2 diabetes mellitus with diabetic neuropathy, without long-term current use of insulin  Low dose sliding scale    Essential hypertension  Continue home coreg        VTE Risk Mitigation (From admission, onward)         Ordered     enoxaparin injection 30 mg  Daily      01/07/20 2327     Place sequential compression device  Until discontinued      01/07/20 2327     IP VTE HIGH RISK PATIENT  Once      01/07/20 2327                    Mahin Mosqueda MD  Medical Oncology  [facility]      I have reviewed the notes, assessments, and/or procedures performed by the housestaff, as above.  I have personally interviewed and examined the patient at the beside, and rounded with the housestaff. I concur with her/his assessment and plan and the documentation of Jerrod Mendez.  I, Dr. Juan Carlos Cooney, personally spent more than 70 mins during this encounter, greater than 50% was spent in direct counseling and/or coordination of care.     Juan Carlos Cooney M.D., M.S., F.A.C.P.  Hematology/Oncology Attending  [facility]

## 2020-01-08 NOTE — PROGRESS NOTES
Patient unable to eat this am, due to throat and mouth pain, dilaudid given. Patients wife at the bedside attentive to patient.

## 2020-01-09 LAB
ALBUMIN SERPL BCP-MCNC: 3.1 G/DL (ref 3.5–5.2)
ALP SERPL-CCNC: 73 U/L (ref 55–135)
ALT SERPL W/O P-5'-P-CCNC: 8 U/L (ref 10–44)
ANION GAP SERPL CALC-SCNC: 10 MMOL/L (ref 8–16)
ANISOCYTOSIS BLD QL SMEAR: SLIGHT
AST SERPL-CCNC: 9 U/L (ref 10–40)
BASOPHILS # BLD AUTO: 0.01 K/UL (ref 0–0.2)
BASOPHILS NFR BLD: 0.1 % (ref 0–1.9)
BILIRUB SERPL-MCNC: 2.3 MG/DL (ref 0.1–1)
BUN SERPL-MCNC: 24 MG/DL (ref 8–23)
CALCIUM SERPL-MCNC: 9.1 MG/DL (ref 8.7–10.5)
CHLORIDE SERPL-SCNC: 103 MMOL/L (ref 95–110)
CO2 SERPL-SCNC: 25 MMOL/L (ref 23–29)
CREAT SERPL-MCNC: 1.7 MG/DL (ref 0.5–1.4)
DIFFERENTIAL METHOD: ABNORMAL
EOSINOPHIL # BLD AUTO: 0.1 K/UL (ref 0–0.5)
EOSINOPHIL NFR BLD: 0.6 % (ref 0–8)
ERYTHROCYTE [DISTWIDTH] IN BLOOD BY AUTOMATED COUNT: 13.6 % (ref 11.5–14.5)
EST. GFR  (AFRICAN AMERICAN): 48.9 ML/MIN/1.73 M^2
EST. GFR  (NON AFRICAN AMERICAN): 42.3 ML/MIN/1.73 M^2
GLUCOSE SERPL-MCNC: 119 MG/DL (ref 70–110)
HCT VFR BLD AUTO: 30.5 % (ref 40–54)
HGB BLD-MCNC: 9.6 G/DL (ref 14–18)
IMM GRANULOCYTES # BLD AUTO: 0.03 K/UL (ref 0–0.04)
IMM GRANULOCYTES NFR BLD AUTO: 0.3 % (ref 0–0.5)
LYMPHOCYTES # BLD AUTO: 0.4 K/UL (ref 1–4.8)
LYMPHOCYTES NFR BLD: 4.1 % (ref 18–48)
MAGNESIUM SERPL-MCNC: 1.5 MG/DL (ref 1.6–2.6)
MCH RBC QN AUTO: 30.1 PG (ref 27–31)
MCHC RBC AUTO-ENTMCNC: 31.5 G/DL (ref 32–36)
MCV RBC AUTO: 96 FL (ref 82–98)
MONOCYTES # BLD AUTO: 0.5 K/UL (ref 0.3–1)
MONOCYTES NFR BLD: 5.7 % (ref 4–15)
NEUTROPHILS # BLD AUTO: 7.9 K/UL (ref 1.8–7.7)
NEUTROPHILS NFR BLD: 89.2 % (ref 38–73)
NRBC BLD-RTO: 0 /100 WBC
PHOSPHATE SERPL-MCNC: 2.3 MG/DL (ref 2.7–4.5)
PLATELET # BLD AUTO: 141 K/UL (ref 150–350)
PLATELET BLD QL SMEAR: ABNORMAL
PMV BLD AUTO: 9.1 FL (ref 9.2–12.9)
POCT GLUCOSE: 121 MG/DL (ref 70–110)
POCT GLUCOSE: 122 MG/DL (ref 70–110)
POCT GLUCOSE: 144 MG/DL (ref 70–110)
POTASSIUM SERPL-SCNC: 4.1 MMOL/L (ref 3.5–5.1)
PROT SERPL-MCNC: 6.1 G/DL (ref 6–8.4)
RBC # BLD AUTO: 3.19 M/UL (ref 4.6–6.2)
SODIUM SERPL-SCNC: 138 MMOL/L (ref 136–145)
WBC # BLD AUTO: 8.88 K/UL (ref 3.9–12.7)

## 2020-01-09 PROCEDURE — 63600175 PHARM REV CODE 636 W HCPCS: Performed by: SURGERY

## 2020-01-09 PROCEDURE — 63600175 PHARM REV CODE 636 W HCPCS: Performed by: INTERNAL MEDICINE

## 2020-01-09 PROCEDURE — 99233 SBSQ HOSP IP/OBS HIGH 50: CPT | Mod: ,,, | Performed by: INTERNAL MEDICINE

## 2020-01-09 PROCEDURE — 80053 COMPREHEN METABOLIC PANEL: CPT

## 2020-01-09 PROCEDURE — 92526 ORAL FUNCTION THERAPY: CPT

## 2020-01-09 PROCEDURE — 25000003 PHARM REV CODE 250: Performed by: STUDENT IN AN ORGANIZED HEALTH CARE EDUCATION/TRAINING PROGRAM

## 2020-01-09 PROCEDURE — 99233 PR SUBSEQUENT HOSPITAL CARE,LEVL III: ICD-10-PCS | Mod: ,,, | Performed by: INTERNAL MEDICINE

## 2020-01-09 PROCEDURE — 97535 SELF CARE MNGMENT TRAINING: CPT

## 2020-01-09 PROCEDURE — 20600001 HC STEP DOWN PRIVATE ROOM

## 2020-01-09 PROCEDURE — 25000003 PHARM REV CODE 250: Performed by: SURGERY

## 2020-01-09 PROCEDURE — 85025 COMPLETE CBC W/AUTO DIFF WBC: CPT

## 2020-01-09 PROCEDURE — 84100 ASSAY OF PHOSPHORUS: CPT

## 2020-01-09 PROCEDURE — 83735 ASSAY OF MAGNESIUM: CPT

## 2020-01-09 RX ORDER — MAGNESIUM SULFATE HEPTAHYDRATE 40 MG/ML
2 INJECTION, SOLUTION INTRAVENOUS ONCE
Status: COMPLETED | OUTPATIENT
Start: 2020-01-09 | End: 2020-01-09

## 2020-01-09 RX ORDER — HYDROMORPHONE HYDROCHLORIDE 1 MG/ML
1 INJECTION, SOLUTION INTRAMUSCULAR; INTRAVENOUS; SUBCUTANEOUS
Status: DISCONTINUED | OUTPATIENT
Start: 2020-01-09 | End: 2020-01-12

## 2020-01-09 RX ADMIN — Medication 10 ML: at 08:01

## 2020-01-09 RX ADMIN — Medication 10 ML: at 06:01

## 2020-01-09 RX ADMIN — HYDROMORPHONE HYDROCHLORIDE 1 MG: 1 INJECTION, SOLUTION INTRAMUSCULAR; INTRAVENOUS; SUBCUTANEOUS at 12:01

## 2020-01-09 RX ADMIN — Medication 10 ML: at 12:01

## 2020-01-09 RX ADMIN — LEVOTHYROXINE SODIUM 50 MCG: 50 TABLET ORAL at 09:01

## 2020-01-09 RX ADMIN — CARVEDILOL 3.12 MG: 3.12 TABLET, FILM COATED ORAL at 08:01

## 2020-01-09 RX ADMIN — CARVEDILOL 3.12 MG: 3.12 TABLET, FILM COATED ORAL at 09:01

## 2020-01-09 RX ADMIN — Medication 10 ML: at 09:01

## 2020-01-09 RX ADMIN — SODIUM CHLORIDE, SODIUM LACTATE, POTASSIUM CHLORIDE, AND CALCIUM CHLORIDE: .6; .31; .03; .02 INJECTION, SOLUTION INTRAVENOUS at 08:01

## 2020-01-09 RX ADMIN — FLUCONAZOLE 200 MG: 2 INJECTION, SOLUTION INTRAVENOUS at 12:01

## 2020-01-09 RX ADMIN — ENOXAPARIN SODIUM 40 MG: 100 INJECTION SUBCUTANEOUS at 06:01

## 2020-01-09 RX ADMIN — HYDROMORPHONE HYDROCHLORIDE 1 MG: 1 INJECTION, SOLUTION INTRAMUSCULAR; INTRAVENOUS; SUBCUTANEOUS at 08:01

## 2020-01-09 RX ADMIN — MAGNESIUM SULFATE IN WATER 2 G: 40 INJECTION, SOLUTION INTRAVENOUS at 08:01

## 2020-01-09 NOTE — PROGRESS NOTES
[facility]  Hematology/Oncology  Progress Note    Patient Name: Jerrod Mendez  Admission Date: 1/7/2020  Hospital Length of Stay: 2 days  Code Status: Full Code     Subjective:     HPI:  Mr Mendez is a 63 YO male former smoker with stage 4B Right Tonsil cancer (diagnosed 11/19/20) currently on chemotherapy who presents with ongoing nausea and vomiting.      The patient has stage stage 4B R. 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  Plans for carbo/5FU/keytruda x 6 cycles, followed by maintenance 5FU. Most recently been on continuous chemo with pump since last Friday.      The patient reports this is his second round of chemo. The intial, he did not tolerate well. He reports he has had ongoing nausea/vomiting. In addition, has severe mouth pain due to mucositis. He is not able to toelrate po intake. He went to [facility] the [facility], was discharged after receiving IV dilaudid, which helped his pain and allowed him to eat temporarily. He reports the symptoms returend, and he was advised by Dr Blount to come to the main [facility].      He denies any recent fevers, sick contacts, no unusual foods.   No abdominal pain. He reports the mucositis seems improved, he has difficulties swallowing and opening his mouth.    Interval History:  Tried some liquids last night but unable to tolerate. Did try a small amount of liquid this morning and was able to tolerate that. Pain okay.    Oncology Treatment Plan:   OP HEAD AND NECK PEMBROLIZUMAB FLUOROURACIL CARBOPLATIN Q3W    Medications:  Continuous Infusions:   lactated ringers 75 mL/hr at 01/08/20 1059     Scheduled Meds:   carvedilol  3.125 mg Oral BID    duke's soln (benadryl 30 mL, mylanta 30 mL, lidocaine 30 mL, nystatin 30 mL) 120 mL  10 mL Oral QID    enoxaparin  40 mg Subcutaneous Daily    fluconazole (DIFLUCAN) IVPB  200 mg  Intravenous Q24H    levothyroxine  50 mcg Oral Daily     PRN Meds:(Magic mouthwash) 1:1:1 Benadryl 12.5mg/5ml liq, aluminum & magnesium hydroxide-simehticone (Maalox), lidocaine viscous 2%, Dextrose 10% Bolus, Dextrose 10% Bolus, glucagon (human recombinant), glucose, glucose, HYDROmorphone, insulin aspart U-100, ondansetron, oxyCODONE, prochlorperazine, sodium chloride 0.9%     Review of Systems   Constitutional: Negative for chills and fever.   HENT: Positive for mouth sores and trouble swallowing.         Odynophagia   Respiratory: Negative for cough and shortness of breath.    Cardiovascular: Negative for chest pain.   Gastrointestinal: Positive for nausea and vomiting.     Objective:     Vital Signs (Most Recent):  Temp: 98.2 °F (36.8 °C) (01/08/20 1945)  Pulse: 93 (01/09/20 0400)  Resp: 20 (01/09/20 0400)  BP: 136/76 (01/09/20 0400)  SpO2: (!) 90 % (01/09/20 0400) Vital Signs (24h Range):  Temp:  [98.1 °F (36.7 °C)-98.2 °F (36.8 °C)] 98.2 °F (36.8 °C)  Pulse:  [82-94] 93  Resp:  [12-20] 20  SpO2:  [90 %-96 %] 90 %  BP: (114-145)/(69-81) 136/76        There is no height or weight on file to calculate BMI.  There is no height or weight on file to calculate BSA.      Intake/Output Summary (Last 24 hours) at 1/9/2020 0741  Last data filed at 1/9/2020 0400  Gross per 24 hour   Intake 1456.25 ml   Output 0 ml   Net 1456.25 ml       Physical Exam   Constitutional: He is oriented to person, place, and time. He appears well-developed and well-nourished. No distress.   HENT:   Head: Normocephalic and atraumatic.   mucositis   Eyes: Conjunctivae are normal. Scleral icterus is present.   Cardiovascular: Normal rate and regular rhythm. Exam reveals no gallop and no friction rub.   No murmur heard.  L chest PAC in place   Pulmonary/Chest: Effort normal. No stridor. No respiratory distress. He has no wheezes. He has no rales.   Abdominal: Soft. He exhibits no distension. There is no tenderness. There is no guarding.    Musculoskeletal: He exhibits no edema.   Neurological: He is alert and oriented to person, place, and time.   Skin: Skin is warm and dry. He is not diaphoretic.     Significant Labs:   CBC:   Recent Labs   Lab 01/07/20 2003 01/08/20 0400 01/09/20  0530   WBC 9.58 6.13 8.88   HGB 11.4* 10.1* 9.6*   HCT 35.5* 31.1* 30.5*    141* 141*    and CMP:   Recent Labs   Lab 01/07/20 2003 01/08/20 0400 01/09/20  0530    139 138   K 4.2 4.0 4.1    103 103   CO2 23 28 25   * 105 119*   BUN 30* 29* 24*   CREATININE 2.0* 1.7* 1.7*   CALCIUM 10.0 8.9 9.1   PROT 7.3 6.2 6.1   ALBUMIN 3.7 3.2* 3.1*   BILITOT 3.0* 2.3* 2.3*   ALKPHOS 77 67 73   AST 10 10 9*   ALT 10 8* 8*   ANIONGAP 14 8 10   EGFRNONAA 34.7* 42.3* 42.3*     Diagnostic Results:  no new imaging in last 24 hours    Assessment/Plan:     * Mucositis  Severe mucositis 2/2 recent chemo with resultant dysphagia/odynophagia.    PLAN:  - Duke's solution QID, Magic Mouthwash PRN  - fluconazole 400 mg IV x 1 yesterday, then 200 mg IV qday x 6 days to cover for thrush  - SLP evaluated yesterday with MBS - okay for soft mech diet, nectar thin liquids  - oxycodone 10 mg q4h PRN, Dilaudid 1 mg IV q3h PRN    Odynophagia  See Mucositis    CINV (chemotherapy-induced nausea and vomiting)  Chemo-associated N/V    PLAN:  - Zofran/Compazine PRN    Dysphagia, oropharyngeal  See Mucositis    Tonsillar cancer  - initially presented with lump behind R ear 11/2018  - DL 11/12/18 with fullness in R tonsil and CT neck with necrotic mass medial to R parotid gland and posterior jugular LAD with compression IJ/ICA  - US-guided FNA 11/19/18 - PATH SCC  - dysarthria and questionable TIA 11/2018 - MRI/MRA BRAIN unremarkable  - PET 12/10/18 - hypermetabolic R tonsillar mass, multiple metastatic R cervical LN, single metastatic L cervical LN  - 2 doses cisplatin (unable to tolerate 3rd dose 2/2 renal issues) and completed XRT 02/2019  - surveillance CT 10/4/19 with new R  supraclavicular LAD  - underwent modified ND involving levels 2 through 5 with sacrifice of the spinal accessory nerve/ligation R IJ - intra-op findings included tissue at the skull base that was biopsied and returned + SCC and involvement carotid/vagus so gross disease remained at conclusion - PATH SCC  - PET 12/23/19 - hypermetabolic lesion near R carotid, R level 4 LNs  - plans for carboplatin/5FU/Keytruda x 6 cycles, followed by maintenance 5FU - started on 12/30/19    Follows with Dr. Blount    PLAN:  - will need follow-up with Dr. Blount at discharge    Essential hypertension  HOME MED = carvedilol    PLAN:  - continue home carvedilol    Type 2 diabetes mellitus with diabetic neuropathy, without long-term current use of insulin  A1c (11/5/19) = 5.8  HOME MEDS = glipizide, Actos    PLAN:  - LDSSI, POCT glucose AC/HS    CKD (chronic kidney disease) stage 3, GFR 30-59 ml/min  Baseline Cr 2.0. CKD related to cisplatin per Dr. Blount's notes.    PLAN:  - continue to monitor renal function daily    Hyperbilirubinemia  TBili 3.0 at admit - has been elevated in past (2.4 in 2015). RUQ US and other LFTs unremarkable.    PLAN:  - continue to monitor    Hypothyroid  HOME MED = levothyroxine    PLAN:  - continue levothyroxine    Discharge planning issues  PLAN:  - no post-discharge needs identified at this time      Oni Roldan MD  Hematology/Oncology  [facility]  [facility] [id number]        I have reviewed the notes, assessments, and/or procedures performed by the housestaff, as above.  I have personally interviewed and examined the patient at the beside, and rounded with the housestaff. I concur with her/his assessment and plan and the documentation of Jerrod Mendez.  I, Dr. Juan Carlos Cooney, personally spent more than 35 mins during this encounter, greater than 50% was spent in direct counseling and/or coordination of care.     Juan Carlos Cooney M.D., M.S., F.A.C.P.  Hematology/Oncology Attending  [facility]

## 2020-01-09 NOTE — ASSESSMENT & PLAN NOTE
Severe mucositis 2/2 recent chemo with resultant dysphagia/odynophagia.    PLAN:  - Duke's solution QID, Magic Mouthwash PRN  - fluconazole 400 mg IV x 1 yesterday, then 200 mg IV qday x 6 days to cover for thrush  - SLP evaluated yesterday with MBS - okay for soft mech diet, nectar thin liquids

## 2020-01-09 NOTE — ASSESSMENT & PLAN NOTE
Severe mucositis 2/2 recent chemo with resultant dysphagia/odynophagia.    PLAN:  - Duke's solution QID, Magic Mouthwash PRN  - fluconazole 400 mg IV x 1 yesterday, then 200 mg IV qday x 6 days to cover for thrush  - SLP evaluated yesterday with MBS - okay for soft mech diet, nectar thin liquids  - oxycodone 10 mg q4h PRN, Dilaudid 1 mg IV q3h PRN

## 2020-01-09 NOTE — HPI
Mr Mendez is a 63 YO male former smoker with stage 4B Right Tonsil cancer (diagnosed 11/19/20) currently on chemotherapy who presents with ongoing nausea and vomiting.      The patient has stage stage 4B R. 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  Plans for carbo/5FU/keytruda x 6 cycles, followed by maintenance 5FU. Most recently been on continuous chemo with pump since last Friday.      The patient reports this is his second round of chemo. The intial, he did not tolerate well. He reports he has had ongoing nausea/vomiting. In addition, has severe mouth pain due to mucositis. He is not able to toelrate po intake. He went to [facility] the [facility], was discharged after receiving IV dilaudid, which helped his pain and allowed him to eat temporarily. He reports the symptoms returend, and he was advised by Dr Blount to come to the main [facility].      He denies any recent fevers, sick contacts, no unusual foods.   No abdominal pain. He reports the mucositis seems improved, he has difficulties swallowing and opening his mouth.

## 2020-01-09 NOTE — ASSESSMENT & PLAN NOTE
Baseline Cr 2.0. CKD related to cisplatin per Dr. Blount's notes.    PLAN:  - continue to monitor renal function daily

## 2020-01-09 NOTE — SUBJECTIVE & OBJECTIVE
Interval History:  Tried some liquids last night but unable to tolerate. Did try a small amount of liquid this morning and was able to tolerate that. Pain okay.    Oncology Treatment Plan:   OP HEAD AND NECK PEMBROLIZUMAB FLUOROURACIL CARBOPLATIN Q3W    Medications:  Continuous Infusions:   lactated ringers 75 mL/hr at 01/08/20 1059     Scheduled Meds:   carvedilol  3.125 mg Oral BID    duke's soln (benadryl 30 mL, mylanta 30 mL, lidocaine 30 mL, nystatin 30 mL) 120 mL  10 mL Oral QID    enoxaparin  40 mg Subcutaneous Daily    fluconazole (DIFLUCAN) IVPB  200 mg Intravenous Q24H    levothyroxine  50 mcg Oral Daily     PRN Meds:(Magic mouthwash) 1:1:1 Benadryl 12.5mg/5ml liq, aluminum & magnesium hydroxide-simehticone (Maalox), lidocaine viscous 2%, Dextrose 10% Bolus, Dextrose 10% Bolus, glucagon (human recombinant), glucose, glucose, HYDROmorphone, insulin aspart U-100, ondansetron, oxyCODONE, prochlorperazine, sodium chloride 0.9%     Review of Systems   Constitutional: Negative for chills and fever.   HENT: Positive for mouth sores and trouble swallowing.         Odynophagia   Respiratory: Negative for cough and shortness of breath.    Cardiovascular: Negative for chest pain.   Gastrointestinal: Positive for nausea and vomiting.     Objective:     Vital Signs (Most Recent):  Temp: 98.2 °F (36.8 °C) (01/08/20 1945)  Pulse: 93 (01/09/20 0400)  Resp: 20 (01/09/20 0400)  BP: 136/76 (01/09/20 0400)  SpO2: (!) 90 % (01/09/20 0400) Vital Signs (24h Range):  Temp:  [98.1 °F (36.7 °C)-98.2 °F (36.8 °C)] 98.2 °F (36.8 °C)  Pulse:  [82-94] 93  Resp:  [12-20] 20  SpO2:  [90 %-96 %] 90 %  BP: (114-145)/(69-81) 136/76        There is no height or weight on file to calculate BMI.  There is no height or weight on file to calculate BSA.      Intake/Output Summary (Last 24 hours) at 1/9/2020 0741  Last data filed at 1/9/2020 0400  Gross per 24 hour   Intake 1456.25 ml   Output 0 ml   Net 1456.25 ml       Physical Exam    Constitutional: He is oriented to person, place, and time. He appears well-developed and well-nourished. No distress.   HENT:   Head: Normocephalic and atraumatic.   mucositis   Eyes: Conjunctivae are normal. Scleral icterus is present.   Cardiovascular: Normal rate and regular rhythm. Exam reveals no gallop and no friction rub.   No murmur heard.  L chest PAC in place   Pulmonary/Chest: Effort normal. No stridor. No respiratory distress. He has no wheezes. He has no rales.   Abdominal: Soft. He exhibits no distension. There is no tenderness. There is no guarding.   Musculoskeletal: He exhibits no edema.   Neurological: He is alert and oriented to person, place, and time.   Skin: Skin is warm and dry. He is not diaphoretic.     Significant Labs:   CBC:   Recent Labs   Lab 01/07/20 2003 01/08/20 0400 01/09/20  0530   WBC 9.58 6.13 8.88   HGB 11.4* 10.1* 9.6*   HCT 35.5* 31.1* 30.5*    141* 141*    and CMP:   Recent Labs   Lab 01/07/20 2003 01/08/20 0400 01/09/20  0530    139 138   K 4.2 4.0 4.1    103 103   CO2 23 28 25   * 105 119*   BUN 30* 29* 24*   CREATININE 2.0* 1.7* 1.7*   CALCIUM 10.0 8.9 9.1   PROT 7.3 6.2 6.1   ALBUMIN 3.7 3.2* 3.1*   BILITOT 3.0* 2.3* 2.3*   ALKPHOS 77 67 73   AST 10 10 9*   ALT 10 8* 8*   ANIONGAP 14 8 10   EGFRNONAA 34.7* 42.3* 42.3*     Diagnostic Results:  no new imaging in last 24 hours

## 2020-01-09 NOTE — ASSESSMENT & PLAN NOTE
- initially presented with lump behind R ear 11/2018  - DL 11/12/18 with fullness in R tonsil and CT neck with necrotic mass medial to R parotid gland and posterior jugular LAD with compression IJ/ICA  - US-guided FNA 11/19/18 - PATH SCC  - dysarthria and questionable TIA 11/2018 - MRI/MRA BRAIN unremarkable  - PET 12/10/18 - hypermetabolic R tonsillar mass, multiple metastatic R cervical LN, single metastatic L cervical LN  - 2 doses cisplatin (unable to tolerate 3rd dose 2/2 renal issues) and completed XRT 02/2019  - surveillance CT 10/4/19 with new R supraclavicular LAD  - underwent modified ND involving levels 2 through 5 with sacrifice of the spinal accessory nerve/ligation R IJ - intra-op findings included tissue at the skull base that was biopsied and returned + SCC and involvement carotid/vagus so gross disease remained at conclusion - PATH SCC  - PET 12/23/19 - hypermetabolic lesion near R carotid, R level 4 LNs  - plans for carboplatin/5FU/Keytruda x 6 cycles, followed by maintenance 5FU - started on 12/30/19    Follows with Dr. Blount    PLAN:  - will need follow-up with Dr. Blount at discharge

## 2020-01-09 NOTE — PLAN OF CARE
AOx4, VSS. Accuchecks ACHS - , no coverage needed.  Pt c/o pain  7/10 to mouth/generalized body aches. PRN medication given, relief obtained. Pt education provided on alternative measure to control pain, when medication is not an option. Pt verbalized understanding.  Remains on LR @ 75cc/hr. [place] solution administered. Pt w/ difficulty swallowing. Speech to follow pt. Pills crushed at this time for ease. Pt remains free from falls. Bed locked and lowered. Personal items & call light w/in reach. Frequency checks completed for safety. Fall risk reviewed with pt. Pt verbalized understanding. WCTM.

## 2020-01-09 NOTE — HOSPITAL COURSE
65 y/o male with stage IV tonsillar SCC recently started on carboplatin/5FU/Keytruda who presented to the ED with mucositis and N/V. Pain initially controlled with IV pain meds - transitioned to PO oxycodone with good response. Started on fluconazole for oral thrush. Duke's and magic mouth wash given with significant improvement in symptoms. He developed a gout flare (;eft toe) that resolved with a dose of prednisone.  N/V resolved - able to tolerate some liquids and soft foods, deemed safe for discharge today. Will complete total of 7 days of fluconazol and continue with PO oxycodone PRN. Allopurinol will be started for gout flare prevention. Inflammation and pain to left toe resolved at time of discharge. Will continue with Duke's solution for mucositis at home.

## 2020-01-09 NOTE — PT/OT/SLP PROGRESS
"Speech Language Pathology Treatment    Patient Name:  Jerrod Mendez   MRN:  [id number]   [id number]    Admitting Diagnosis: Mucositis    Recommendations:                 General Recommendations:  Dysphagia therapy  Diet recommendations:  Mechanical soft, Nectar Thick Crushed medications in puree  § Strategies:    § Intermittent throat clears   § Multiple swallows with chin tuck every bite/sip   § Aspiration Precautions:   § Excellent and frequent oral care   § Upright for all intake and 30 minutes after    § Intermittent throat clears and pull up residue if feeling stuck in throat   § Patient okay for single sips of non-thickened water after oral care  § Patient okay for Boost supplements without thickener  § Patient okay for thicker soups without mixed consistencies  General Precautions: Standard, aspiration, nectar thick   Communication strategies:  none    Subjective     Patient awake and cooperative with wife present in room.   Patient states, "I think it's a little easier today. If I take bigger sips, it goes down better."  Wife states, "He is still having trouble. He waits for ya'll to leave before he starts coughing things up."    Objective:     Has the patient been evaluated by SLP for swallowing?   Yes  Keep patient NPO? No   Current Respiratory Status: room air      Dysphagia Therapy: Patient seen with breakfast tray including bites of eggs, grits, and apple sauce and sips of thickened juice and non-thickened water. Patient completing intermittent throat clears and multiple swallows without cues. He did present with coughing/choking after multiple sips of non thickened water, and SLP recommended adding thickener when patient becomes choked. Patient independently added thickener to water. Patient sitting upright for po intake. He reports difficulties with dry, chopped meats. SLP recommended patient avoid dry/crumbly food items. He reported he has not been receiving Boost supplements, and SLP notified RN " and MD. Boost supplement ordered. SLP introduced swallowing exercises including BOT strengthening exercises (yawn, gargle, hard /g/ and /k/), effortful swallows, super-supraglottic swallow, and Lottie exercise. Patient familiar with all swallowing exercises from Speech Therapy in the past when he had a PEG tube. Patient and wife reported 'thinking back on it' patient has had difficulties swallowing since he began eating again post PEG removal. He reports swallowing worsened when he began having pain. He reports feeling like he is stopping himself from completing the swallow 2/2 anticipation of pain. He does report thickened liquids are easier. Dietary representative entered room to take patient's order for lunch. Patient requesting soup for lunch, however, unable to order soup on thickened liquid/mechanocal soft diet. Diet order modified to allow patient to order soups, and Mr. Mendez to add thickener as needed.     Education: SLP provided patient and family (spouse) education on:  -How to use thickener, types of thickener, where to purchase thickener, and SLP recommendations for thickener (xantan gum based vs. corn starch based)  -safe swallowing precautions  -continued aspiration risk with all po intake at this time  -s/s of aspiration  -risks of aspiration  -importance of adequate po intake  -trail dysphagia therapy   Patient and patient's wife verbalized understanding of all discussed and are in agreement with POC. SLP provided handout listing swallowing exercises.     Assessment:     Jerrod Mendez is a 64 y.o. male with an SLP diagnosis of Dysphagia.  ST will continue to follow.     Goals:   Multidisciplinary Problems     SLP Goals        Problem: SLP Goal    Goal Priority Disciplines Outcome   SLP Goal     SLP Ongoing, Progressing   Description:  Speech Therapy Short Term Goals  Goal expected to be met by 1/22  1. Pt will participate in a Modified Barium Swallow Study to objectively r/o aspiration and  determine the least restrictive and safest po diet. -MET  2. Patient will tolerate a mechanical soft diet and nectar thick liquids with swallowing strategies and precautions in place with minimal s/s of aspiration.   3. SLP will provide further education on all SLP recommendations, safe swallowing precautions, and strategies.   4. Patient will complete trial dysphagia therapy exercises x10 targeting BOT strength and epiglottic inversion.                        Plan:     · Patient to be seen:  4 x/week   · Plan of Care expires:     · Plan of Care reviewed with:  patient, spouse   · SLP Follow-Up:  Yes       Discharge recommendations:  outpatient speech therapy   Barriers to Discharge:  None    Time Tracking:     SLP Treatment Date:   01/09/20  Speech Start Time:  0818  Speech Stop Time:  0906     Speech Total Time (min):  48 min    Billable Minutes: Speech Therapy Individual 24 and Seld Care/Home Management Training 24    FLORENCIA Kat, CCC-SLP  01/09/2020

## 2020-01-09 NOTE — PLAN OF CARE
Problem: SLP Goal  Goal: SLP Goal  Description  Speech Therapy Short Term Goals  Goal expected to be met by 1/22  1. Pt will participate in a Modified Barium Swallow Study to objectively r/o aspiration and determine the least restrictive and safest po diet. -MET  2. Patient will tolerate a mechanical soft diet and nectar thick liquids with swallowing strategies and precautions in place with minimal s/s of aspiration.   3. SLP will provide further education on all SLP recommendations, safe swallowing precautions, and strategies.   4. Patient will complete trial dysphagia therapy exercises x10 targeting BOT strength and epiglottic inversion.       Outcome: Ongoing, Progressing   ST will continue to follow.  CARMEN Caputo., CCC-SLP  01/09/2020

## 2020-01-09 NOTE — ASSESSMENT & PLAN NOTE
TBili 3.0 at admit - has been elevated in past (2.4 in 2015). RUQ US and other LFTs unremarkable.    PLAN:  - continue to monitor

## 2020-01-10 LAB
ALBUMIN SERPL BCP-MCNC: 3 G/DL (ref 3.5–5.2)
ALP SERPL-CCNC: 69 U/L (ref 55–135)
ALT SERPL W/O P-5'-P-CCNC: 8 U/L (ref 10–44)
ANION GAP SERPL CALC-SCNC: 8 MMOL/L (ref 8–16)
AST SERPL-CCNC: 8 U/L (ref 10–40)
BASOPHILS # BLD AUTO: 0.01 K/UL (ref 0–0.2)
BASOPHILS NFR BLD: 0.2 % (ref 0–1.9)
BILIRUB SERPL-MCNC: 2.3 MG/DL (ref 0.1–1)
BUN SERPL-MCNC: 20 MG/DL (ref 8–23)
CALCIUM SERPL-MCNC: 9 MG/DL (ref 8.7–10.5)
CHLORIDE SERPL-SCNC: 102 MMOL/L (ref 95–110)
CO2 SERPL-SCNC: 27 MMOL/L (ref 23–29)
CREAT SERPL-MCNC: 1.7 MG/DL (ref 0.5–1.4)
DIFFERENTIAL METHOD: ABNORMAL
EOSINOPHIL # BLD AUTO: 0.1 K/UL (ref 0–0.5)
EOSINOPHIL NFR BLD: 1.6 % (ref 0–8)
ERYTHROCYTE [DISTWIDTH] IN BLOOD BY AUTOMATED COUNT: 13.6 % (ref 11.5–14.5)
EST. GFR  (AFRICAN AMERICAN): 48.9 ML/MIN/1.73 M^2
EST. GFR  (NON AFRICAN AMERICAN): 42.3 ML/MIN/1.73 M^2
GLUCOSE SERPL-MCNC: 116 MG/DL (ref 70–110)
HCT VFR BLD AUTO: 27.7 % (ref 40–54)
HGB BLD-MCNC: 8.7 G/DL (ref 14–18)
IMM GRANULOCYTES # BLD AUTO: 0.01 K/UL (ref 0–0.04)
IMM GRANULOCYTES NFR BLD AUTO: 0.2 % (ref 0–0.5)
LYMPHOCYTES # BLD AUTO: 0.4 K/UL (ref 1–4.8)
LYMPHOCYTES NFR BLD: 6.1 % (ref 18–48)
MAGNESIUM SERPL-MCNC: 1.8 MG/DL (ref 1.6–2.6)
MCH RBC QN AUTO: 30.4 PG (ref 27–31)
MCHC RBC AUTO-ENTMCNC: 31.4 G/DL (ref 32–36)
MCV RBC AUTO: 97 FL (ref 82–98)
MONOCYTES # BLD AUTO: 0.5 K/UL (ref 0.3–1)
MONOCYTES NFR BLD: 7.1 % (ref 4–15)
NEUTROPHILS # BLD AUTO: 5.4 K/UL (ref 1.8–7.7)
NEUTROPHILS NFR BLD: 84.8 % (ref 38–73)
NRBC BLD-RTO: 0 /100 WBC
PHOSPHATE SERPL-MCNC: 2 MG/DL (ref 2.7–4.5)
PLATELET # BLD AUTO: 132 K/UL (ref 150–350)
PMV BLD AUTO: 9 FL (ref 9.2–12.9)
POCT GLUCOSE: 125 MG/DL (ref 70–110)
POCT GLUCOSE: 128 MG/DL (ref 70–110)
POCT GLUCOSE: 130 MG/DL (ref 70–110)
POTASSIUM SERPL-SCNC: 3.9 MMOL/L (ref 3.5–5.1)
PROT SERPL-MCNC: 6.1 G/DL (ref 6–8.4)
RBC # BLD AUTO: 2.86 M/UL (ref 4.6–6.2)
SODIUM SERPL-SCNC: 137 MMOL/L (ref 136–145)
WBC # BLD AUTO: 6.38 K/UL (ref 3.9–12.7)

## 2020-01-10 PROCEDURE — 63600175 PHARM REV CODE 636 W HCPCS: Performed by: SURGERY

## 2020-01-10 PROCEDURE — 25000003 PHARM REV CODE 250: Performed by: SURGERY

## 2020-01-10 PROCEDURE — 99232 SBSQ HOSP IP/OBS MODERATE 35: CPT | Mod: ,,, | Performed by: INTERNAL MEDICINE

## 2020-01-10 PROCEDURE — 85025 COMPLETE CBC W/AUTO DIFF WBC: CPT

## 2020-01-10 PROCEDURE — 80053 COMPREHEN METABOLIC PANEL: CPT

## 2020-01-10 PROCEDURE — 84100 ASSAY OF PHOSPHORUS: CPT

## 2020-01-10 PROCEDURE — 25000003 PHARM REV CODE 250: Performed by: STUDENT IN AN ORGANIZED HEALTH CARE EDUCATION/TRAINING PROGRAM

## 2020-01-10 PROCEDURE — 63600175 PHARM REV CODE 636 W HCPCS: Performed by: INTERNAL MEDICINE

## 2020-01-10 PROCEDURE — 99232 PR SUBSEQUENT HOSPITAL CARE,LEVL II: ICD-10-PCS | Mod: ,,, | Performed by: INTERNAL MEDICINE

## 2020-01-10 PROCEDURE — 83735 ASSAY OF MAGNESIUM: CPT

## 2020-01-10 PROCEDURE — 20600001 HC STEP DOWN PRIVATE ROOM

## 2020-01-10 PROCEDURE — 92526 ORAL FUNCTION THERAPY: CPT

## 2020-01-10 RX ADMIN — FLUCONAZOLE 200 MG: 2 INJECTION, SOLUTION INTRAVENOUS at 01:01

## 2020-01-10 RX ADMIN — CARVEDILOL 3.12 MG: 3.12 TABLET, FILM COATED ORAL at 08:01

## 2020-01-10 RX ADMIN — CARVEDILOL 3.12 MG: 3.12 TABLET, FILM COATED ORAL at 09:01

## 2020-01-10 RX ADMIN — HYDROMORPHONE HYDROCHLORIDE 1 MG: 1 INJECTION, SOLUTION INTRAMUSCULAR; INTRAVENOUS; SUBCUTANEOUS at 09:01

## 2020-01-10 RX ADMIN — LEVOTHYROXINE SODIUM 50 MCG: 50 TABLET ORAL at 09:01

## 2020-01-10 RX ADMIN — HYDROMORPHONE HYDROCHLORIDE 1 MG: 1 INJECTION, SOLUTION INTRAMUSCULAR; INTRAVENOUS; SUBCUTANEOUS at 01:01

## 2020-01-10 RX ADMIN — Medication 10 ML: at 04:01

## 2020-01-10 RX ADMIN — ENOXAPARIN SODIUM 40 MG: 100 INJECTION SUBCUTANEOUS at 04:01

## 2020-01-10 RX ADMIN — Medication 10 ML: at 01:01

## 2020-01-10 RX ADMIN — Medication 10 ML: at 09:01

## 2020-01-10 NOTE — PLAN OF CARE
Pt is AAOX4. Compaining still of mouth pain which is inhibiting eating.    -drank boost and apple juice  Dukes solution given  -IV dilaudid effective for pain relief  -VISI on, vss, pt on home coreg  -mouth lesions and swelling noted. Discomfort noted when pt talks or does anything with mouth.   -IVF inuing at 75 cc/hr  -wound care consult placed

## 2020-01-10 NOTE — PLAN OF CARE
AAOx3, afebrile, c/o pain. PRN pain medication given. BG monitored achs- no coverage needed. LR @75cc/hr. Speech following pt. Pills being crushed at this time. Pt able to position self independently.  Pt in lowest position, side rails up x2, non-skid foot wear in place, call light within reach, pt verbalized understanding to call RN when needed.  Will continue to monitor.

## 2020-01-10 NOTE — PROGRESS NOTES
Admit Assessment    Patient Identification  Jerrod Mendez   :  1955  Admit Date:  2020  Attending Provider:  Alan Richards MD              Referral:   Pt was admitted to  with a diagnosis of Mucositis, and was admitted this hospital stay due to Hyperbilirubinemia [E80.6]  Mucositis [K12.30]  Elevated bilirubin [R17]  Vomiting, intractability of vomiting not specified, presence of nausea not specified, unspecified vomiting type [R11.10].       is involved was referred to the Social Work Department via routine referral.  Patient presents as a 64 y.o. year old  male.    Persons interviewed : Patient    Living Situation:      Resides at [address] [place] [address]  [place] [address] [place] [address], phone: [phone] (home).  Resides with his wife        Current or Past Agencies and Description of Services/Supplies    DME: Patient has not needed equipment prior to this hospitalization. He does not anticipate needed any post hospitalization.   Equipment Currently Used at Home: none    Home Health: Patient is currently not active on home health services. He does not anticipate needing the service post discharge.       IV Infusion: N/A      Nutrition: Oral.Inatke has been down due to mucositis.     Outpatient Pharmacy:     [facility]/pharm[facility] #[id number] - [place], [place] - [address]  [address]  [place] [address]  Phone: [phone] Fax: [phone]      Patient Preference of agencies include: he does not express a preference.    Patient/Caregiver informed of right to choose providers or agencies.  Patient provides permission to release any necessary information to [facility] and to Non[facility] agencies as needed to facilitate patient care, treatment planning, and patient discharge planning.  Written and verbal resources provided.      Coping: Patient does not express much. He answers questions minimally. He does have multiple stressors. Patient known to me from speaking with wife to assist  with [facility] in the past. 1 daughter is currently at [facility] undergoing cancer treatment. Another daughter is getting  on valentines day and is worried that he will not be able to walk her down the aisle.           Adjustment to Diagnosis and Treatment: Fair    Emotional/Behavioral/Cognitive Issues: None observed or noted.             History/Current Symptoms of Anxiety/Depression: No:   History/Current Substance Use:   Social History     Tobacco Use    Smoking status: Former Smoker     Types: Cigarettes     Last attempt to quit: 2008     Years since quittin.0    Smokeless tobacco: Former User    Tobacco comment: quit     Substance and Sexual Activity    Alcohol use: Yes     Comment: one beer a month    Drug use: No    Sexual activity: Not on file     Comment:        Indications of Abuse/Neglect: No:   Abuse Screen (yes response referral indicated)  Feels Unsafe at Home or Work/School: no    Financial:  Payor/Plan Subscr  Sex Relation Sub. Ins. ID Effective Group Num   1. BLUE CROSS BL* ANAID VAZQUEZ 1955 Male  [id number]                                    [id number]                            Other identified concerns/needs: None at this time    Plan: Return home    Interventions/Referrals: None at this time  Patient/caregiver engaged in treatment planning process.     providing psychosocial and supportive counseling, resources, education, assistance and discharge planning as appropriate.  Patient/caregiver state understanding of  available resources,  following, remains available.

## 2020-01-10 NOTE — PROGRESS NOTES
[facility]  Hematology/Oncology  Progress Note    Patient Name: Jerrod Mendez  Admission Date: 1/7/2020  Hospital Length of Stay: 3 days  Code Status: Full Code     Subjective:     HPI:  Mr Mendez is a 65 YO male former smoker with stage 4B Right Tonsil cancer (diagnosed 11/19/20) currently on chemotherapy who presents with ongoing nausea and vomiting.      The patient has stage stage 4B R. 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  Plans for carbo/5FU/keytruda x 6 cycles, followed by maintenance 5FU. Most recently been on continuous chemo with pump since last Friday.      The patient reports this is his second round of chemo. The intial, he did not tolerate well. He reports he has had ongoing nausea/vomiting. In addition, has severe mouth pain due to mucositis. He is not able to toelrate po intake. He went to [facility], was discharged after receiving IV dilaudid, which helped his pain and allowed him to eat temporarily. He reports the symptoms returend, and he was advised by Dr Blount to come to the main [facility].      He denies any recent fevers, sick contacts, no unusual foods.   No abdominal pain. He reports the mucositis seems improved, he has difficulties swallowing and opening his mouth.    Interval History:  Still complaining of pain that is only mildly better than the day before.  Pain okay.    Oncology Treatment Plan:   OP HEAD AND NECK PEMBROLIZUMAB FLUOROURACIL CARBOPLATIN Q3W    Medications:  Continuous Infusions:   lactated ringers 75 mL/hr at 01/09/20 2016     Scheduled Meds:   carvedilol  3.125 mg Oral BID    duke's soln (benadryl 30 mL, mylanta 30 mL, lidocaine 30 mL, nystatin 30 mL) 120 mL  10 mL Oral QID    enoxaparin  40 mg Subcutaneous Daily    fluconazole (DIFLUCAN) IVPB  200 mg Intravenous Q24H    levothyroxine  50 mcg Oral Daily      PRN Meds:(Magic mouthwash) 1:1:1 Benadryl 12.5mg/5ml liq, aluminum & magnesium hydroxide-simehticone (Maalox), lidocaine viscous 2%, Dextrose 10% Bolus, Dextrose 10% Bolus, glucagon (human recombinant), glucose, glucose, HYDROmorphone, insulin aspart U-100, ondansetron, oxyCODONE, prochlorperazine, sodium chloride 0.9%     Review of Systems   Constitutional: Negative for chills and fever.   HENT: Positive for mouth sores and trouble swallowing.         Odynophagia   Respiratory: Negative for cough and shortness of breath.    Cardiovascular: Negative for chest pain.   Gastrointestinal: Positive for nausea and vomiting.     Objective:     Vital Signs (Most Recent):  Temp: 99 °F (37.2 °C) (01/10/20 0417)  Pulse: 105 (01/10/20 0417)  Resp: 18 (01/10/20 0417)  BP: (!) 140/81 (01/10/20 0417)  SpO2: (!) 92 % (01/10/20 0417) Vital Signs (24h Range):  Temp:  [98 °F (36.7 °C)-99 °F (37.2 °C)] 99 °F (37.2 °C)  Pulse:  [] 105  Resp:  [13-18] 18  SpO2:  [92 %-97 %] 92 %  BP: (126-158)/(73-91) 140/81     Weight: 111.5 kg (245 lb 13 oz)  Body mass index is 31.56 kg/m².  Body surface area is 2.41 meters squared.      Intake/Output Summary (Last 24 hours) at 1/10/2020 0826  Last data filed at 1/10/2020 0400  Gross per 24 hour   Intake 2415 ml   Output 200 ml   Net 2215 ml       Physical Exam   Constitutional: He is oriented to person, place, and time. He appears well-developed and well-nourished. No distress.   HENT:   Head: Normocephalic and atraumatic.   mucositis   Eyes: Conjunctivae are normal. Scleral icterus is present.   Cardiovascular: Normal rate and regular rhythm. Exam reveals no gallop and no friction rub.   No murmur heard.  L chest PAC in place   Pulmonary/Chest: Effort normal. No stridor. No respiratory distress. He has no wheezes. He has no rales.   Abdominal: Soft. He exhibits no distension. There is no tenderness. There is no guarding.   Musculoskeletal: He exhibits no edema.   Neurological: He is alert  and oriented to person, place, and time.   Skin: Skin is warm and dry. He is not diaphoretic.     Significant Labs:   CBC:   Recent Labs   Lab 01/09/20  0530 01/10/20  0500   WBC 8.88 6.38   HGB 9.6* 8.7*   HCT 30.5* 27.7*   * 132*    and CMP:   Recent Labs   Lab 01/09/20  0530 01/10/20  0500    137   K 4.1 3.9    102   CO2 25 27   * 116*   BUN 24* 20   CREATININE 1.7* 1.7*   CALCIUM 9.1 9.0   PROT 6.1 6.1   ALBUMIN 3.1* 3.0*   BILITOT 2.3* 2.3*   ALKPHOS 73 69   AST 9* 8*   ALT 8* 8*   ANIONGAP 10 8   EGFRNONAA 42.3* 42.3*     Diagnostic Results:  no new imaging in last 24 hours    Assessment/Plan:     * Mucositis  Severe mucositis 2/2 recent chemo with resultant dysphagia/odynophagia.    PLAN:  - Duke's solution QID, Magic Mouthwash PRN  - fluconazole 400 mg IV x 1 yesterday, then 200 mg IV qday x 6 days to cover for thrush  - SLP evaluated yesterday with MBS - okay for soft mech diet, nectar thin liquids  - oxycodone 10 mg q4h PRN, Dilaudid 1 mg IV q3h PRN (used twice last night).    Discharge planning issues  PLAN:  - no post-discharge needs identified at this time    Odynophagia  See Mucositis    Hyperbilirubinemia  TBili 3.0 at admit - has been elevated in past (2.4 in 2015). RUQ US and other LFTs unremarkable.    PLAN:  - continue to monitor    Hypothyroid  HOME MED = levothyroxine    PLAN:  - continue levothyroxine    CKD (chronic kidney disease) stage 3, GFR 30-59 ml/min  Baseline Cr 2.0. CKD related to cisplatin per Dr. Blount's notes.    PLAN:  - continue to monitor renal function daily    CINV (chemotherapy-induced nausea and vomiting)  Chemo-associated N/V    PLAN:  - Zofran/Compazine PRN    Dysphagia, oropharyngeal  See Mucositis    Tonsillar cancer  - initially presented with lump behind R ear 11/2018  - DL 11/12/18 with fullness in R tonsil and CT neck with necrotic mass medial to R parotid gland and posterior jugular LAD with compression IJ/ICA  - US-guided FNA 11/19/18 - PATH  SCC  - dysarthria and questionable TIA 11/2018 - MRI/MRA BRAIN unremarkable  - PET 12/10/18 - hypermetabolic R tonsillar mass, multiple metastatic R cervical LN, single metastatic L cervical LN  - 2 doses cisplatin (unable to tolerate 3rd dose 2/2 renal issues) and completed XRT 02/2019  - surveillance CT 10/4/19 with new R supraclavicular LAD  - underwent modified ND involving levels 2 through 5 with sacrifice of the spinal accessory nerve/ligation R IJ - intra-op findings included tissue at the skull base that was biopsied and returned + SCC and involvement carotid/vagus so gross disease remained at conclusion - PATH SCC  - PET 12/23/19 - hypermetabolic lesion near R carotid, R level 4 LNs  - plans for carboplatin/5FU/Keytruda x 6 cycles, followed by maintenance 5FU - started on 12/30/19  - Will likely need change of treatment or dose reduction given severe mucositis.    Follows with Dr. Blount    PLAN:  - will need follow-up with Dr. Blount at discharge    Type 2 diabetes mellitus with diabetic neuropathy, without long-term current use of insulin  A1c (11/5/19) = 5.8  HOME MEDS = glipizide, Actos    PLAN:  - LDSSI, POCT glucose AC/HS    Essential hypertension  HOME MED = carvedilol    PLAN:  - continue home carvedilol    Eric Arriaga MD  Hematology/Oncology  [facility]  [facility] [address]

## 2020-01-10 NOTE — PT/OT/SLP PROGRESS
"Speech Language Pathology Treatment    Patient Name:  Jerrod Mendez   MRN:  [id number]   [id number]    Admitting Diagnosis: Mucositis    Recommendations:                 General Recommendations:  Dysphagia therapy  Diet recommendations:  Mechanical soft, Liquid Diet Level: Nectar Thick   Aspiration Precautions:   § Strategies:    § Intermittent throat clears   § Multiple swallows with chin tuck every bite/sip   § Aspiration Precautions:   § Excellent and frequent oral care   § Upright for all intake and 30 minutes after    § Intermittent throat clears and pull up residue if feeling stuck in throat   § Patient okay for single sips of non-thickened water after oral care  § Patient okay for Boost supplements without thickener  § Patient okay for thicker soups without mixed consistencies  General Precautions: Standard, aspiration, fall, nectar thick    Subjective     "My mouth hurts."     Pain/Comfort:  · Pain Rating 1: 7/10  · Location 1: mouth  · Pain Addressed 1: Distraction  · Pain Rating Post-Intervention 1: 7/10    Objective:     Has the patient been evaluated by SLP for swallowing?   Yes  Keep patient NPO? No   Current Respiratory Status: room air      Pt awake and alert upon entry. He ind'ly recalled and demonstrated yulissa and BOT /k/ exercises x5 each with adequate ability. Given model to initiate, he completed the following additional dysphagia exercises x5 each: BOT /g/, effortful swallow, supraglottic, and falsetto /I/ Adequate completion of BOT /g/, effortful swallow, and supraglottic exercises. However during falsetto exercise, dec'd ability to raise and sustain pitch appreciated as pt's vocal quality significantly dysphonic as his pitch inc'd. Although pt reported handout for independent completion of dysphagia exercises provided during prior SLP session at bedside, was not visualized by clinician this session. Pt ind'ly recalled aspiration precautions for completion of multiple swallows per bite/ sip, " as well as completion of intermittent, volitional throat clearing. However he did not recall aspiration precaution for chin tuck position upon swallow of all bites and sips. Therefore extensive education provided re: need for strict and consistent implementation of all aspiration precautions to reduce risk for aspiration, per results of prior MBSS, ongoing independent completion of all dysphagia exercises listed above 5-10x/ each, 2-3x/ day, as well as ongoing SLP POC. Pt verbalized understanding of all education provided and agreement with SLP POC. White board updated. No further questions.     Assessment:     Jerrod Mendez is a 64 y.o. male with an SLP diagnosis of dysphagia.     Goals:   Multidisciplinary Problems     SLP Goals        Problem: SLP Goal    Goal Priority Disciplines Outcome   SLP Goal     SLP Ongoing, Progressing   Description:  Speech Therapy Short Term Goals  Goal expected to be met by 1/22  1. Pt will participate in a Modified Barium Swallow Study to objectively r/o aspiration and determine the least restrictive and safest po diet. -MET  2. Patient will tolerate a mechanical soft diet and nectar thick liquids with swallowing strategies and precautions in place with minimal s/s of aspiration.   3. SLP will provide further education on all SLP recommendations, safe swallowing precautions, and strategies.   4. Patient will complete trial dysphagia therapy exercises x10 targeting BOT strength and epiglottic inversion.                      Plan:     · Patient to be seen:  3 x/week   · Plan of Care expires:  02/07/20  · Plan of Care reviewed with:  patient   · SLP Follow-Up:  Yes       Discharge recommendations:  outpatient speech therapy     Time Tracking:     SLP Treatment Date:   01/10/20  Speech Start Time:  1046  Speech Stop Time:  1055     Speech Total Time (min):  9 min    Billable Minutes: Treatment Swallowing Dysfunction 9    FLORENCIA Gonzales, CCC-SLP  [phone]  1/10/2020

## 2020-01-10 NOTE — SUBJECTIVE & OBJECTIVE
Interval History:  No new issues overnight. Odynophagia feels slightly better this AM - has not tried liquids yet today.    Oncology Treatment Plan:   OP HEAD AND NECK PEMBROLIZUMAB FLUOROURACIL CARBOPLATIN Q3W    Medications:  Continuous Infusions:   lactated ringers 75 mL/hr at 01/09/20 2016     Scheduled Meds:   carvedilol  3.125 mg Oral BID    duke's soln (benadryl 30 mL, mylanta 30 mL, lidocaine 30 mL, nystatin 30 mL) 120 mL  10 mL Oral QID    enoxaparin  40 mg Subcutaneous Daily    fluconazole (DIFLUCAN) IVPB  200 mg Intravenous Q24H    levothyroxine  50 mcg Oral Daily     PRN Meds:(Magic mouthwash) 1:1:1 Benadryl 12.5mg/5ml liq, aluminum & magnesium hydroxide-simehticone (Maalox), lidocaine viscous 2%, Dextrose 10% Bolus, Dextrose 10% Bolus, glucagon (human recombinant), glucose, glucose, HYDROmorphone, insulin aspart U-100, ondansetron, oxyCODONE, prochlorperazine, sodium chloride 0.9%     Review of Systems   Constitutional: Negative for chills and fever.   HENT: Positive for mouth sores and trouble swallowing.         Odynophagia   Respiratory: Negative for cough and shortness of breath.    Cardiovascular: Negative for chest pain.   Gastrointestinal: Negative for nausea and vomiting.     Objective:     Vital Signs (Most Recent):  Temp: 99 °F (37.2 °C) (01/10/20 0417)  Pulse: 105 (01/10/20 0417)  Resp: 18 (01/10/20 0417)  BP: (!) 140/81 (01/10/20 0417)  SpO2: (!) 92 % (01/10/20 0417) Vital Signs (24h Range):  Temp:  [98 °F (36.7 °C)-99 °F (37.2 °C)] 99 °F (37.2 °C)  Pulse:  [] 105  Resp:  [13-18] 18  SpO2:  [92 %-97 %] 92 %  BP: (126-158)/(73-91) 140/81     Weight: 111.5 kg (245 lb 13 oz)  Body mass index is 31.56 kg/m².  Body surface area is 2.41 meters squared.      Intake/Output Summary (Last 24 hours) at 1/10/2020 0826  Last data filed at 1/10/2020 0400  Gross per 24 hour   Intake 2415 ml   Output 200 ml   Net 2215 ml       Physical Exam   Constitutional: He is oriented to person, place, and  time. He appears well-developed and well-nourished. No distress.   HENT:   Head: Normocephalic and atraumatic.   mucositis   Eyes: Conjunctivae are normal. Scleral icterus is present.   Cardiovascular: Normal rate and regular rhythm. Exam reveals no gallop and no friction rub.   No murmur heard.  L chest PAC in place   Pulmonary/Chest: Effort normal. No stridor. No respiratory distress. He has no wheezes. He has no rales.   Abdominal: Soft. He exhibits no distension. There is no tenderness. There is no guarding.   Musculoskeletal: He exhibits no edema.   Neurological: He is alert and oriented to person, place, and time.   Skin: Skin is warm and dry. He is not diaphoretic.     Significant Labs:   CBC:   Recent Labs   Lab 01/10/20  0500 01/11/20  0600   WBC 6.38 4.98   HGB 8.7* 8.7*   HCT 27.7* 26.9*   * 129*    and CMP:   Recent Labs   Lab 01/10/20  0500 01/11/20  0600    138   K 3.9 3.9    102   CO2 27 28   * 117*   BUN 20 16   CREATININE 1.7* 1.4   CALCIUM 9.0 9.3   PROT 6.1 6.0   ALBUMIN 3.0* 2.9*   BILITOT 2.3* 2.0*   ALKPHOS 69 72   AST 8* 10   ALT 8* 10   ANIONGAP 8 8   EGFRNONAA 42.3* 53.5*     Diagnostic Results:  no new imaging in last 24 hours

## 2020-01-11 LAB
ALBUMIN SERPL BCP-MCNC: 2.9 G/DL (ref 3.5–5.2)
ALP SERPL-CCNC: 72 U/L (ref 55–135)
ALT SERPL W/O P-5'-P-CCNC: 10 U/L (ref 10–44)
ANION GAP SERPL CALC-SCNC: 8 MMOL/L (ref 8–16)
AST SERPL-CCNC: 10 U/L (ref 10–40)
BASOPHILS # BLD AUTO: 0.02 K/UL (ref 0–0.2)
BASOPHILS NFR BLD: 0.4 % (ref 0–1.9)
BILIRUB SERPL-MCNC: 2 MG/DL (ref 0.1–1)
BUN SERPL-MCNC: 16 MG/DL (ref 8–23)
CALCIUM SERPL-MCNC: 9.3 MG/DL (ref 8.7–10.5)
CHLORIDE SERPL-SCNC: 102 MMOL/L (ref 95–110)
CO2 SERPL-SCNC: 28 MMOL/L (ref 23–29)
CREAT SERPL-MCNC: 1.4 MG/DL (ref 0.5–1.4)
DIFFERENTIAL METHOD: ABNORMAL
EOSINOPHIL # BLD AUTO: 0.2 K/UL (ref 0–0.5)
EOSINOPHIL NFR BLD: 3.4 % (ref 0–8)
ERYTHROCYTE [DISTWIDTH] IN BLOOD BY AUTOMATED COUNT: 13.4 % (ref 11.5–14.5)
EST. GFR  (AFRICAN AMERICAN): >60 ML/MIN/1.73 M^2
EST. GFR  (NON AFRICAN AMERICAN): 53.5 ML/MIN/1.73 M^2
GLUCOSE SERPL-MCNC: 117 MG/DL (ref 70–110)
HCT VFR BLD AUTO: 26.9 % (ref 40–54)
HGB BLD-MCNC: 8.7 G/DL (ref 14–18)
IMM GRANULOCYTES # BLD AUTO: 0.02 K/UL (ref 0–0.04)
IMM GRANULOCYTES NFR BLD AUTO: 0.4 % (ref 0–0.5)
LYMPHOCYTES # BLD AUTO: 0.4 K/UL (ref 1–4.8)
LYMPHOCYTES NFR BLD: 7.8 % (ref 18–48)
MAGNESIUM SERPL-MCNC: 1.6 MG/DL (ref 1.6–2.6)
MCH RBC QN AUTO: 31 PG (ref 27–31)
MCHC RBC AUTO-ENTMCNC: 32.3 G/DL (ref 32–36)
MCV RBC AUTO: 96 FL (ref 82–98)
MONOCYTES # BLD AUTO: 0.4 K/UL (ref 0.3–1)
MONOCYTES NFR BLD: 8.4 % (ref 4–15)
NEUTROPHILS # BLD AUTO: 4 K/UL (ref 1.8–7.7)
NEUTROPHILS NFR BLD: 79.6 % (ref 38–73)
NRBC BLD-RTO: 0 /100 WBC
PHOSPHATE SERPL-MCNC: 2.3 MG/DL (ref 2.7–4.5)
PLATELET # BLD AUTO: 129 K/UL (ref 150–350)
PMV BLD AUTO: 8.5 FL (ref 9.2–12.9)
POCT GLUCOSE: 127 MG/DL (ref 70–110)
POCT GLUCOSE: 132 MG/DL (ref 70–110)
POCT GLUCOSE: 162 MG/DL (ref 70–110)
POCT GLUCOSE: 165 MG/DL (ref 70–110)
POTASSIUM SERPL-SCNC: 3.9 MMOL/L (ref 3.5–5.1)
PROT SERPL-MCNC: 6 G/DL (ref 6–8.4)
RBC # BLD AUTO: 2.81 M/UL (ref 4.6–6.2)
SODIUM SERPL-SCNC: 138 MMOL/L (ref 136–145)
WBC # BLD AUTO: 4.98 K/UL (ref 3.9–12.7)

## 2020-01-11 PROCEDURE — 99233 PR SUBSEQUENT HOSPITAL CARE,LEVL III: ICD-10-PCS | Mod: ,,, | Performed by: INTERNAL MEDICINE

## 2020-01-11 PROCEDURE — 25000003 PHARM REV CODE 250: Performed by: SURGERY

## 2020-01-11 PROCEDURE — 83735 ASSAY OF MAGNESIUM: CPT

## 2020-01-11 PROCEDURE — 80053 COMPREHEN METABOLIC PANEL: CPT

## 2020-01-11 PROCEDURE — 99233 SBSQ HOSP IP/OBS HIGH 50: CPT | Mod: ,,, | Performed by: INTERNAL MEDICINE

## 2020-01-11 PROCEDURE — 63600175 PHARM REV CODE 636 W HCPCS: Performed by: INTERNAL MEDICINE

## 2020-01-11 PROCEDURE — 25000003 PHARM REV CODE 250: Performed by: STUDENT IN AN ORGANIZED HEALTH CARE EDUCATION/TRAINING PROGRAM

## 2020-01-11 PROCEDURE — 85025 COMPLETE CBC W/AUTO DIFF WBC: CPT

## 2020-01-11 PROCEDURE — 63600175 PHARM REV CODE 636 W HCPCS: Performed by: SURGERY

## 2020-01-11 PROCEDURE — 20600001 HC STEP DOWN PRIVATE ROOM

## 2020-01-11 PROCEDURE — 84100 ASSAY OF PHOSPHORUS: CPT

## 2020-01-11 RX ADMIN — Medication 10 ML: at 12:01

## 2020-01-11 RX ADMIN — CARVEDILOL 3.12 MG: 3.12 TABLET, FILM COATED ORAL at 08:01

## 2020-01-11 RX ADMIN — HYDROMORPHONE HYDROCHLORIDE 1 MG: 1 INJECTION, SOLUTION INTRAMUSCULAR; INTRAVENOUS; SUBCUTANEOUS at 08:01

## 2020-01-11 RX ADMIN — ENOXAPARIN SODIUM 40 MG: 100 INJECTION SUBCUTANEOUS at 05:01

## 2020-01-11 RX ADMIN — HYDROMORPHONE HYDROCHLORIDE 1 MG: 1 INJECTION, SOLUTION INTRAMUSCULAR; INTRAVENOUS; SUBCUTANEOUS at 05:01

## 2020-01-11 RX ADMIN — Medication 10 ML: at 05:01

## 2020-01-11 RX ADMIN — LEVOTHYROXINE SODIUM 50 MCG: 50 TABLET ORAL at 08:01

## 2020-01-11 RX ADMIN — SODIUM CHLORIDE, SODIUM LACTATE, POTASSIUM CHLORIDE, AND CALCIUM CHLORIDE: .6; .31; .03; .02 INJECTION, SOLUTION INTRAVENOUS at 05:01

## 2020-01-11 RX ADMIN — Medication 10 ML: at 08:01

## 2020-01-11 RX ADMIN — HYDROMORPHONE HYDROCHLORIDE 1 MG: 1 INJECTION, SOLUTION INTRAMUSCULAR; INTRAVENOUS; SUBCUTANEOUS at 12:01

## 2020-01-11 RX ADMIN — FLUCONAZOLE 200 MG: 2 INJECTION, SOLUTION INTRAVENOUS at 12:01

## 2020-01-11 NOTE — PLAN OF CARE
-Pt AAOx4  -Vital signs stable  -BG monitoring ACHS  -LR going at 75cc/hr  -Pt with complaints of pain throughout shift.  -Fall precautions maintained, non skid socks worn  -No acute events/falls/injuries this shift  -Pt aware to call for help with ambulating.    -Bed lowered, locked, siderails up x2, and call bell in reach.    See flowsheet for assessment findings.  Will continue to monitor pt.

## 2020-01-11 NOTE — PROGRESS NOTES
[facility]  Hematology/Oncology  Progress Note    Patient Name: Jerrod Mendez  Admission Date: 1/7/2020  Hospital Length of Stay: 4 days  Code Status: Full Code     Subjective:     HPI:  Mr Mendez is a 65 YO male former smoker with stage 4B Right Tonsil cancer (diagnosed 11/19/20) currently on chemotherapy who presents with ongoing nausea and vomiting.      The patient has stage stage 4B R. 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  Plans for carbo/5FU/keytruda x 6 cycles, followed by maintenance 5FU. Most recently been on continuous chemo with pump since last Friday.      The patient reports this is his second round of chemo. The intial, he did not tolerate well. He reports he has had ongoing nausea/vomiting. In addition, has severe mouth pain due to mucositis. He is not able to toelrate po intake. He went to [facility], was discharged after receiving IV dilaudid, which helped his pain and allowed him to eat temporarily. He reports the symptoms returend, and he was advised by Dr Blount to come to the main [facility].      He denies any recent fevers, sick contacts, no unusual foods.   No abdominal pain. He reports the mucositis seems improved, he has difficulties swallowing and opening his mouth.    Interval History:  No new issues overnight. Odynophagia feels slightly better this AM - has not tried liquids yet today.    Oncology Treatment Plan:   OP HEAD AND NECK PEMBROLIZUMAB FLUOROURACIL CARBOPLATIN Q3W    Medications:  Continuous Infusions:   lactated ringers 75 mL/hr at 01/09/20 2016     Scheduled Meds:   carvedilol  3.125 mg Oral BID    duke's soln (benadryl 30 mL, mylanta 30 mL, lidocaine 30 mL, nystatin 30 mL) 120 mL  10 mL Oral QID    enoxaparin  40 mg Subcutaneous Daily    fluconazole (DIFLUCAN) IVPB  200 mg Intravenous Q24H    levothyroxine  50  mcg Oral Daily     PRN Meds:(Magic mouthwash) 1:1:1 Benadryl 12.5mg/5ml liq, aluminum & magnesium hydroxide-simehticone (Maalox), lidocaine viscous 2%, Dextrose 10% Bolus, Dextrose 10% Bolus, glucagon (human recombinant), glucose, glucose, HYDROmorphone, insulin aspart U-100, ondansetron, oxyCODONE, prochlorperazine, sodium chloride 0.9%     Review of Systems   Constitutional: Negative for chills and fever.   HENT: Positive for mouth sores and trouble swallowing.         Odynophagia   Respiratory: Negative for cough and shortness of breath.    Cardiovascular: Negative for chest pain.   Gastrointestinal: Negative for nausea and vomiting.     Objective:     Vital Signs (Most Recent):  Temp: 99 °F (37.2 °C) (01/10/20 0417)  Pulse: 105 (01/10/20 0417)  Resp: 18 (01/10/20 0417)  BP: (!) 140/81 (01/10/20 0417)  SpO2: (!) 92 % (01/10/20 0417) Vital Signs (24h Range):  Temp:  [98 °F (36.7 °C)-99 °F (37.2 °C)] 99 °F (37.2 °C)  Pulse:  [] 105  Resp:  [13-18] 18  SpO2:  [92 %-97 %] 92 %  BP: (126-158)/(73-91) 140/81     Weight: 111.5 kg (245 lb 13 oz)  Body mass index is 31.56 kg/m².  Body surface area is 2.41 meters squared.      Intake/Output Summary (Last 24 hours) at 1/10/2020 0826  Last data filed at 1/10/2020 0400  Gross per 24 hour   Intake 2415 ml   Output 200 ml   Net 2215 ml       Physical Exam   Constitutional: He is oriented to person, place, and time. He appears well-developed and well-nourished. No distress.   HENT:   Head: Normocephalic and atraumatic.   mucositis   Eyes: Conjunctivae are normal. Scleral icterus is present.   Cardiovascular: Normal rate and regular rhythm. Exam reveals no gallop and no friction rub.   No murmur heard.  L chest PAC in place   Pulmonary/Chest: Effort normal. No stridor. No respiratory distress. He has no wheezes. He has no rales.   Abdominal: Soft. He exhibits no distension. There is no tenderness. There is no guarding.   Musculoskeletal: He exhibits no edema.    Neurological: He is alert and oriented to person, place, and time.   Skin: Skin is warm and dry. He is not diaphoretic.     Significant Labs:   CBC:   Recent Labs   Lab 01/10/20  0500 01/11/20  0600   WBC 6.38 4.98   HGB 8.7* 8.7*   HCT 27.7* 26.9*   * 129*    and CMP:   Recent Labs   Lab 01/10/20  0500 01/11/20  0600    138   K 3.9 3.9    102   CO2 27 28   * 117*   BUN 20 16   CREATININE 1.7* 1.4   CALCIUM 9.0 9.3   PROT 6.1 6.0   ALBUMIN 3.0* 2.9*   BILITOT 2.3* 2.0*   ALKPHOS 69 72   AST 8* 10   ALT 8* 10   ANIONGAP 8 8   EGFRNONAA 42.3* 53.5*     Diagnostic Results:  no new imaging in last 24 hours    Assessment/Plan:     * Mucositis  Severe mucositis 2/2 recent chemo with resultant dysphagia/odynophagia.    PLAN:  - Duke's solution QID, Magic Mouthwash PRN  - fluconazole 200 mg IV qday x 7 days (400 mg on day 1) to cover for thrush  - SLP evaluated yesterday with MBS - okay for soft mech diet, nectar thin liquids  - oxycodone 10 mg q4h PRN, Dilaudid 1 mg IV q3h PRN    Odynophagia  See Mucositis    CINV (chemotherapy-induced nausea and vomiting)  Chemo-associated N/V    PLAN:  - Zofran/Compazine PRN    Dysphagia, oropharyngeal  See Mucositis    Tonsillar cancer  - initially presented with lump behind R ear 11/2018  - DL 11/12/18 with fullness in R tonsil and CT neck with necrotic mass medial to R parotid gland and posterior jugular LAD with compression IJ/ICA  - US-guided FNA 11/19/18 - PATH SCC  - dysarthria and questionable TIA 11/2018 - MRI/MRA BRAIN unremarkable  - PET 12/10/18 - hypermetabolic R tonsillar mass, multiple metastatic R cervical LN, single metastatic L cervical LN  - 2 doses cisplatin (unable to tolerate 3rd dose 2/2 renal issues) and completed XRT 02/2019  - surveillance CT 10/4/19 with new R supraclavicular LAD  - underwent modified ND involving levels 2 through 5 with sacrifice of the spinal accessory nerve/ligation R IJ - intra-op findings included tissue at the  skull base that was biopsied and returned + SCC and involvement carotid/vagus so gross disease remained at conclusion - PATH SCC  - PET 12/23/19 - hypermetabolic lesion near R carotid, R level 4 LNs  - plans for carboplatin/5FU/Keytruda x 6 cycles, followed by maintenance 5FU - started on 12/30/19    Follows with Dr. Blount    PLAN:  - will need follow-up with Dr. Blount at discharge    Essential hypertension  HOME MED = carvedilol    PLAN:  - continue home carvedilol    Type 2 diabetes mellitus with diabetic neuropathy, without long-term current use of insulin  A1c (11/5/19) = 5.8  HOME MEDS = glipizide, Actos    PLAN:  - LDSSI, POCT glucose AC/HS    CKD (chronic kidney disease) stage 3, GFR 30-59 ml/min  Baseline Cr 2.0. CKD related to cisplatin per Dr. Blount's notes.    PLAN:  - continue to monitor renal function daily    Hyperbilirubinemia  TBili 3.0 at admit - has been elevated in past (2.4 in 2015). RUQ US and other LFTs unremarkable.    PLAN:  - continue to monitor    Hypothyroid  HOME MED = levothyroxine    PLAN:  - continue levothyroxine    Discharge planning issues  PLAN:  - no post-discharge needs identified at this time      Oni Roldan MD  Hematology/Oncology  [facility] [id number]        I have reviewed the notes, assessments, and/or procedures performed by the housestaff, as above.  I have personally interviewed and examined the patient at the beside, and rounded with the housestaff. I concur with her/his assessment and plan and the documentation of Jerrod Mendez.  I, Dr. Juan Carlos Cooney, personally spent more than 35 mins during this encounter, greater than 50% was spent in direct counseling and/or coordination of care.     Juan Carlos Cooney M.D., M.S., F.A.C.P.  Hematology/Oncology Attending  [facility]

## 2020-01-11 NOTE — ASSESSMENT & PLAN NOTE
Severe mucositis 2/2 recent chemo with resultant dysphagia/odynophagia.    PLAN:  - Duke's solution QID, Magic Mouthwash PRN  - fluconazole 200 mg IV qday x 7 days (400 mg on day 1) to cover for thrush  - SLP evaluated yesterday with MBS - okay for soft mech diet, nectar thin liquids  - oxycodone 10 mg q4h PRN, Dilaudid 1 mg IV q3h PRN

## 2020-01-11 NOTE — PLAN OF CARE
Patient is AAOx4, VSS, remains afebrile, C/o pain to throat, worse with meals controlled with current regimen. Reinforced teaching about pain management. LR @ 75 ml/hr continuous to Left chest port. No longer on thickened liquids. Diet remains mech soft.

## 2020-01-12 LAB
ALBUMIN SERPL BCP-MCNC: 2.9 G/DL (ref 3.5–5.2)
ALP SERPL-CCNC: 69 U/L (ref 55–135)
ALT SERPL W/O P-5'-P-CCNC: 10 U/L (ref 10–44)
ANION GAP SERPL CALC-SCNC: 9 MMOL/L (ref 8–16)
AST SERPL-CCNC: 11 U/L (ref 10–40)
BASOPHILS # BLD AUTO: 0.01 K/UL (ref 0–0.2)
BASOPHILS NFR BLD: 0.4 % (ref 0–1.9)
BILIRUB SERPL-MCNC: 1.7 MG/DL (ref 0.1–1)
BUN SERPL-MCNC: 14 MG/DL (ref 8–23)
CALCIUM SERPL-MCNC: 9 MG/DL (ref 8.7–10.5)
CHLORIDE SERPL-SCNC: 101 MMOL/L (ref 95–110)
CO2 SERPL-SCNC: 28 MMOL/L (ref 23–29)
CREAT SERPL-MCNC: 1.4 MG/DL (ref 0.5–1.4)
DIFFERENTIAL METHOD: ABNORMAL
EOSINOPHIL # BLD AUTO: 0.2 K/UL (ref 0–0.5)
EOSINOPHIL NFR BLD: 6.9 % (ref 0–8)
ERYTHROCYTE [DISTWIDTH] IN BLOOD BY AUTOMATED COUNT: 13.5 % (ref 11.5–14.5)
EST. GFR  (AFRICAN AMERICAN): >60 ML/MIN/1.73 M^2
EST. GFR  (NON AFRICAN AMERICAN): 53.5 ML/MIN/1.73 M^2
GLUCOSE SERPL-MCNC: 111 MG/DL (ref 70–110)
HCT VFR BLD AUTO: 26.5 % (ref 40–54)
HGB BLD-MCNC: 8.6 G/DL (ref 14–18)
IMM GRANULOCYTES # BLD AUTO: 0.01 K/UL (ref 0–0.04)
IMM GRANULOCYTES NFR BLD AUTO: 0.4 % (ref 0–0.5)
LYMPHOCYTES # BLD AUTO: 0.3 K/UL (ref 1–4.8)
LYMPHOCYTES NFR BLD: 12.4 % (ref 18–48)
MAGNESIUM SERPL-MCNC: 1.5 MG/DL (ref 1.6–2.6)
MCH RBC QN AUTO: 31.2 PG (ref 27–31)
MCHC RBC AUTO-ENTMCNC: 32.5 G/DL (ref 32–36)
MCV RBC AUTO: 96 FL (ref 82–98)
MONOCYTES # BLD AUTO: 0.4 K/UL (ref 0.3–1)
MONOCYTES NFR BLD: 13.9 % (ref 4–15)
NEUTROPHILS # BLD AUTO: 1.7 K/UL (ref 1.8–7.7)
NEUTROPHILS NFR BLD: 66 % (ref 38–73)
NRBC BLD-RTO: 0 /100 WBC
PHOSPHATE SERPL-MCNC: 2.5 MG/DL (ref 2.7–4.5)
PLATELET # BLD AUTO: 131 K/UL (ref 150–350)
PMV BLD AUTO: 8.4 FL (ref 9.2–12.9)
POCT GLUCOSE: 122 MG/DL (ref 70–110)
POCT GLUCOSE: 157 MG/DL (ref 70–110)
POCT GLUCOSE: 186 MG/DL (ref 70–110)
POCT GLUCOSE: 245 MG/DL (ref 70–110)
POTASSIUM SERPL-SCNC: 3.8 MMOL/L (ref 3.5–5.1)
PROT SERPL-MCNC: 6 G/DL (ref 6–8.4)
RBC # BLD AUTO: 2.76 M/UL (ref 4.6–6.2)
SODIUM SERPL-SCNC: 138 MMOL/L (ref 136–145)
WBC # BLD AUTO: 2.59 K/UL (ref 3.9–12.7)

## 2020-01-12 PROCEDURE — 80053 COMPREHEN METABOLIC PANEL: CPT

## 2020-01-12 PROCEDURE — 85025 COMPLETE CBC W/AUTO DIFF WBC: CPT

## 2020-01-12 PROCEDURE — 63600175 PHARM REV CODE 636 W HCPCS: Performed by: INTERNAL MEDICINE

## 2020-01-12 PROCEDURE — 99233 SBSQ HOSP IP/OBS HIGH 50: CPT | Mod: ,,, | Performed by: INTERNAL MEDICINE

## 2020-01-12 PROCEDURE — 99233 PR SUBSEQUENT HOSPITAL CARE,LEVL III: ICD-10-PCS | Mod: ,,, | Performed by: INTERNAL MEDICINE

## 2020-01-12 PROCEDURE — 20600001 HC STEP DOWN PRIVATE ROOM

## 2020-01-12 PROCEDURE — 83735 ASSAY OF MAGNESIUM: CPT

## 2020-01-12 PROCEDURE — 25000003 PHARM REV CODE 250: Performed by: STUDENT IN AN ORGANIZED HEALTH CARE EDUCATION/TRAINING PROGRAM

## 2020-01-12 PROCEDURE — 63600175 PHARM REV CODE 636 W HCPCS: Performed by: SURGERY

## 2020-01-12 PROCEDURE — 84100 ASSAY OF PHOSPHORUS: CPT

## 2020-01-12 PROCEDURE — 25000003 PHARM REV CODE 250: Performed by: SURGERY

## 2020-01-12 RX ORDER — FLUCONAZOLE 200 MG/1
200 TABLET ORAL DAILY
Status: DISCONTINUED | OUTPATIENT
Start: 2020-01-12 | End: 2020-01-13 | Stop reason: HOSPADM

## 2020-01-12 RX ORDER — PREDNISONE 10 MG/1
40 TABLET ORAL ONCE
Status: COMPLETED | OUTPATIENT
Start: 2020-01-12 | End: 2020-01-12

## 2020-01-12 RX ORDER — SODIUM,POTASSIUM PHOSPHATES 280-250MG
2 POWDER IN PACKET (EA) ORAL ONCE
Status: COMPLETED | OUTPATIENT
Start: 2020-01-12 | End: 2020-01-12

## 2020-01-12 RX ORDER — MAGNESIUM SULFATE HEPTAHYDRATE 40 MG/ML
2 INJECTION, SOLUTION INTRAVENOUS ONCE
Status: COMPLETED | OUTPATIENT
Start: 2020-01-12 | End: 2020-01-12

## 2020-01-12 RX ADMIN — FLUCONAZOLE 200 MG: 200 TABLET ORAL at 08:01

## 2020-01-12 RX ADMIN — LEVOTHYROXINE SODIUM 50 MCG: 50 TABLET ORAL at 07:01

## 2020-01-12 RX ADMIN — OXYCODONE HYDROCHLORIDE 10 MG: 10 TABLET ORAL at 12:01

## 2020-01-12 RX ADMIN — Medication 10 ML: at 12:01

## 2020-01-12 RX ADMIN — CARVEDILOL 3.12 MG: 3.12 TABLET, FILM COATED ORAL at 08:01

## 2020-01-12 RX ADMIN — Medication 10 ML: at 08:01

## 2020-01-12 RX ADMIN — PREDNISONE 40 MG: 10 TABLET ORAL at 11:01

## 2020-01-12 RX ADMIN — POTASSIUM & SODIUM PHOSPHATES POWDER PACK 280-160-250 MG 2 PACKET: 280-160-250 PACK at 07:01

## 2020-01-12 RX ADMIN — Medication 10 ML: at 04:01

## 2020-01-12 RX ADMIN — HYDROMORPHONE HYDROCHLORIDE 1 MG: 1 INJECTION, SOLUTION INTRAMUSCULAR; INTRAVENOUS; SUBCUTANEOUS at 05:01

## 2020-01-12 RX ADMIN — MAGNESIUM SULFATE IN WATER 2 G: 40 INJECTION, SOLUTION INTRAVENOUS at 07:01

## 2020-01-12 RX ADMIN — Medication 10 ML: at 05:01

## 2020-01-12 RX ADMIN — ENOXAPARIN SODIUM 40 MG: 100 INJECTION SUBCUTANEOUS at 05:01

## 2020-01-12 NOTE — PROGRESS NOTES
[facility]  Hematology/Oncology  Progress Note    Patient Name: Jerrod Mendez  Admission Date: 1/7/2020  Hospital Length of Stay: 5 days  Code Status: Full Code     Subjective:     HPI:  Mr Mendez is a 63 YO male former smoker with stage 4B Right Tonsil cancer (diagnosed 11/19/20) currently on chemotherapy who presents with ongoing nausea and vomiting.      The patient has stage stage 4B R. 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  Plans for carbo/5FU/keytruda x 6 cycles, followed by maintenance 5FU. Most recently been on continuous chemo with pump since last Friday.      The patient reports this is his second round of chemo. The intial, he did not tolerate well. He reports he has had ongoing nausea/vomiting. In addition, has severe mouth pain due to mucositis. He is not able to toelrate po intake. He went to [facility], was discharged after receiving IV dilaudid, which helped his pain and allowed him to eat temporarily. He reports the symptoms returend, and he was advised by Dr Blount to come to the main [facility].      He denies any recent fevers, sick contacts, no unusual foods.   No abdominal pain. He reports the mucositis seems improved, he has difficulties swallowing and opening his mouth.    Interval History:  No new issues overnight. Able to tolerate some homemade soup broth last night and small amount red beans. Overall, pain and swallowing continue to improve. + BM.    Oncology Treatment Plan:   OP HEAD AND NECK PEMBROLIZUMAB FLUOROURACIL CARBOPLATIN Q3W    Medications:  Continuous Infusions:  Scheduled Meds:   carvedilol  3.125 mg Oral BID    duke's soln (benadryl 30 mL, mylanta 30 mL, lidocaine 30 mL, nystatin 30 mL) 120 mL  10 mL Oral QID    enoxaparin  40 mg Subcutaneous Daily    fluconazole  200 mg Oral Daily    levothyroxine  50 mcg Oral  Daily    magnesium sulfate IVPB  2 g Intravenous Once     PRN Meds:(Magic mouthwash) 1:1:1 Benadryl 12.5mg/5ml liq, aluminum & magnesium hydroxide-simehticone (Maalox), lidocaine viscous 2%, Dextrose 10% Bolus, Dextrose 10% Bolus, glucagon (human recombinant), glucose, glucose, insulin aspart U-100, ondansetron, oxyCODONE, prochlorperazine, sodium chloride 0.9%     Review of Systems   Constitutional: Negative for chills and fever.   HENT: Positive for trouble swallowing.    Respiratory: Negative for shortness of breath.    Cardiovascular: Negative for chest pain.   Gastrointestinal: Negative for abdominal pain, nausea and vomiting.     Objective:     Vital Signs (Most Recent):  Temp: 98.8 °F (37.1 °C) (01/12/20 0720)  Pulse: 90 (01/12/20 0720)  Resp: 16 (01/12/20 0720)  BP: 125/73 (01/12/20 0720)  SpO2: 95 % (01/12/20 0720) Vital Signs (24h Range):  Temp:  [98.4 °F (36.9 °C)-98.8 °F (37.1 °C)] 98.8 °F (37.1 °C)  Pulse:  [73-90] 90  Resp:  [10-18] 16  SpO2:  [93 %-97 %] 95 %  BP: (123-146)/(73-84) 125/73     Weight: 112.2 kg (247 lb 4.3 oz)  Body mass index is 31.75 kg/m².  Body surface area is 2.42 meters squared.      Intake/Output Summary (Last 24 hours) at 1/12/2020 0738  Last data filed at 1/12/2020 0500  Gross per 24 hour   Intake 2305 ml   Output 750 ml   Net 1555 ml       Physical Exam   Constitutional: He is oriented to person, place, and time. He appears well-developed and well-nourished. No distress.   HENT:   Head: Normocephalic and atraumatic.   mucositis   Eyes: Conjunctivae are normal. Scleral icterus (resolving) is present.   Cardiovascular: Normal rate and regular rhythm. Exam reveals no gallop and no friction rub.   No murmur heard.  L chest PAC in place   Pulmonary/Chest: Effort normal. No stridor. No respiratory distress. He has no wheezes. He has no rales.   Abdominal: Soft. He exhibits no distension. There is no tenderness. There is no guarding.   Musculoskeletal: He exhibits no edema.    Neurological: He is alert and oriented to person, place, and time.   Skin: Skin is warm and dry. He is not diaphoretic.     Significant Labs:   CBC:   Recent Labs   Lab 01/11/20  0600 01/12/20  0530   WBC 4.98 2.59*   HGB 8.7* 8.6*   HCT 26.9* 26.5*   * 131*    and CMP:   Recent Labs   Lab 01/11/20  0600 01/12/20  0530    138   K 3.9 3.8    101   CO2 28 28   * 111*   BUN 16 14   CREATININE 1.4 1.4   CALCIUM 9.3 9.0   PROT 6.0 6.0   ALBUMIN 2.9* 2.9*   BILITOT 2.0* 1.7*   ALKPHOS 72 69   AST 10 11   ALT 10 10   ANIONGAP 8 9   EGFRNONAA 53.5* 53.5*       Diagnostic Results:  no new imaging in last 24 hours    Assessment/Plan:     * Mucositis  Severe mucositis 2/2 recent chemo with resultant dysphagia/odynophagia.    PLAN:  - Duke's solution QID, Magic Mouthwash PRN  - 7 day course fluconazole to empirically cover for thrush - to finish 1/14/20  - per SLP okay for soft mech diet, nectar thick liquids - patient unable to tolerate nectar thick liquids - okay to drink milk, soup as tolerated given that staying hydrated is most important at this point  - oxycodone 10 mg q4h PRN, d/c IV Dilaudid in preparation for discharge home    Odynophagia  See Mucositis    CINV (chemotherapy-induced nausea and vomiting)  Chemo-associated N/V    PLAN:  - resolved - d/c PRN Zofran/compazine    Dysphagia, oropharyngeal  See Mucositis    Tonsillar cancer  - initially presented with lump behind R ear 11/2018  - DL 11/12/18 with fullness in R tonsil and CT neck with necrotic mass medial to R parotid gland and posterior jugular LAD with compression IJ/ICA  - US-guided FNA 11/19/18 - PATH SCC  - dysarthria and questionable TIA 11/2018 - MRI/MRA BRAIN unremarkable  - PET 12/10/18 - hypermetabolic R tonsillar mass, multiple metastatic R cervical LN, single metastatic L cervical LN  - 2 doses cisplatin (unable to tolerate 3rd dose 2/2 renal issues) and completed XRT 02/2019  - surveillance CT 10/4/19 with new R  supraclavicular LAD  - underwent modified ND involving levels 2 through 5 with sacrifice of the spinal accessory nerve/ligation R IJ - intra-op findings included tissue at the skull base that was biopsied and returned + SCC and involvement carotid/vagus so gross disease remained at conclusion - PATH SCC  - PET 12/23/19 - hypermetabolic lesion near R carotid, R level 4 LNs  - plans for carboplatin/5FU/Keytruda x 6 cycles, followed by maintenance 5FU - started on 12/30/19    Follows with Dr. Blount    PLAN:  - will need follow-up with Dr. Blount at discharge    Essential hypertension  HOME MED = carvedilol    PLAN:  - continue home carvedilol    Type 2 diabetes mellitus with diabetic neuropathy, without long-term current use of insulin  A1c (11/5/19) = 5.8  HOME MEDS = glipizide, Actos    PLAN:  - LDSSI, POCT glucose AC/HS    CKD (chronic kidney disease) stage 3, GFR 30-59 ml/min  Baseline Cr 2.0. CKD related to cisplatin per Dr. Blount's notes.    PLAN:  - continue to monitor renal function daily    Hyperbilirubinemia  TBili 3.0 at admit - has been elevated in past (2.4 in 2015). RUQ US and other LFTs unremarkable.    PLAN:  - continue to monitor - resolving (TBili 1.7)    Hypothyroid  HOME MED = levothyroxine    PLAN:  - continue home levothyroxine    Discharge planning issues  PLAN:  - no post-discharge needs identified at this time  - plan to discharge home later today vs. tomorrow      Oni Roldan MD  Hematology/Oncology  [facility] [id number]      I have reviewed the notes, assessments, and/or procedures performed by the housestaff, as above.  I have personally interviewed and examined the patient at the beside, and rounded with the housestaff. I concur with her/his assessment and plan and the documentation of Jerrod Mendez.  I, Dr. Juan Carlos Cooney, personally spent more than 35 mins during this encounter, greater than 50% was spent in direct counseling and/or coordination of care.     Juan Carlos MONTES DE OCA  SOLOMON Cooney., M.S., F.A.C.P.  Hematology/Oncology Attending  [facility]

## 2020-01-12 NOTE — SUBJECTIVE & OBJECTIVE
Interval History:  No new issues overnight. Able to tolerate some homemade soup broth last night and small amount red beans. Overall, pain and swallowing continue to improve. + BM.    Oncology Treatment Plan:   OP HEAD AND NECK PEMBROLIZUMAB FLUOROURACIL CARBOPLATIN Q3W    Medications:  Continuous Infusions:  Scheduled Meds:   carvedilol  3.125 mg Oral BID    duke's soln (benadryl 30 mL, mylanta 30 mL, lidocaine 30 mL, nystatin 30 mL) 120 mL  10 mL Oral QID    enoxaparin  40 mg Subcutaneous Daily    fluconazole  200 mg Oral Daily    levothyroxine  50 mcg Oral Daily    magnesium sulfate IVPB  2 g Intravenous Once     PRN Meds:(Magic mouthwash) 1:1:1 Benadryl 12.5mg/5ml liq, aluminum & magnesium hydroxide-simehticone (Maalox), lidocaine viscous 2%, Dextrose 10% Bolus, Dextrose 10% Bolus, glucagon (human recombinant), glucose, glucose, insulin aspart U-100, ondansetron, oxyCODONE, prochlorperazine, sodium chloride 0.9%     Review of Systems   Constitutional: Negative for chills and fever.   HENT: Positive for trouble swallowing.    Respiratory: Negative for shortness of breath.    Cardiovascular: Negative for chest pain.   Gastrointestinal: Negative for abdominal pain, nausea and vomiting.     Objective:     Vital Signs (Most Recent):  Temp: 98.8 °F (37.1 °C) (01/12/20 0720)  Pulse: 90 (01/12/20 0720)  Resp: 16 (01/12/20 0720)  BP: 125/73 (01/12/20 0720)  SpO2: 95 % (01/12/20 0720) Vital Signs (24h Range):  Temp:  [98.4 °F (36.9 °C)-98.8 °F (37.1 °C)] 98.8 °F (37.1 °C)  Pulse:  [73-90] 90  Resp:  [10-18] 16  SpO2:  [93 %-97 %] 95 %  BP: (123-146)/(73-84) 125/73     Weight: 112.2 kg (247 lb 4.3 oz)  Body mass index is 31.75 kg/m².  Body surface area is 2.42 meters squared.      Intake/Output Summary (Last 24 hours) at 1/12/2020 0738  Last data filed at 1/12/2020 0500  Gross per 24 hour   Intake 2305 ml   Output 750 ml   Net 1555 ml       Physical Exam   Constitutional: He is oriented to person, place, and time. He  appears well-developed and well-nourished. No distress.   HENT:   Head: Normocephalic and atraumatic.   mucositis   Eyes: Conjunctivae are normal. Scleral icterus (resolving) is present.   Cardiovascular: Normal rate and regular rhythm. Exam reveals no gallop and no friction rub.   No murmur heard.  L chest PAC in place   Pulmonary/Chest: Effort normal. No stridor. No respiratory distress. He has no wheezes. He has no rales.   Abdominal: Soft. He exhibits no distension. There is no tenderness. There is no guarding.   Musculoskeletal: He exhibits no edema.   Neurological: He is alert and oriented to person, place, and time.   Skin: Skin is warm and dry. He is not diaphoretic.     Significant Labs:   CBC:   Recent Labs   Lab 01/11/20  0600 01/12/20  0530   WBC 4.98 2.59*   HGB 8.7* 8.6*   HCT 26.9* 26.5*   * 131*    and CMP:   Recent Labs   Lab 01/11/20  0600 01/12/20  0530    138   K 3.9 3.8    101   CO2 28 28   * 111*   BUN 16 14   CREATININE 1.4 1.4   CALCIUM 9.3 9.0   PROT 6.0 6.0   ALBUMIN 2.9* 2.9*   BILITOT 2.0* 1.7*   ALKPHOS 72 69   AST 10 11   ALT 10 10   ANIONGAP 8 9   EGFRNONAA 53.5* 53.5*       Diagnostic Results:  no new imaging in last 24 hours

## 2020-01-12 NOTE — ASSESSMENT & PLAN NOTE
TBili 3.0 at admit - has been elevated in past (2.4 in 2015). RUQ US and other LFTs unremarkable.    PLAN:  - continue to monitor - resolving (TBili 1.7)

## 2020-01-12 NOTE — PLAN OF CARE
AAOx4, VSS, afebrile- WBC 4.98, IV fluconazole continued, remains on dysphagia mechanical soft diet; was able to tolerate pureed soup brought from wife. [place] solution continued 4x daily  Pain controlled with 1mg IV dilaudid; requested once overnight  LR @ 75cc/hr- Cr 1.4  Wife to remain @ bedside  No acute changes overnight, sleeping between care, non skid socks worn, call light within reach, will cont to monitor

## 2020-01-12 NOTE — ASSESSMENT & PLAN NOTE
Severe mucositis 2/2 recent chemo with resultant dysphagia/odynophagia.    PLAN:  - Duke's solution QID, Magic Mouthwash PRN  - 7 day course fluconazole to empirically cover for thrush - to finish 1/14/20  - per SLP okay for soft mech diet, nectar thick liquids - patient unable to tolerate nectar thick liquids - okay to drink milk, soup as tolerated given that staying hydrated is most important at this point  - oxycodone 10 mg q4h PRN, d/c IV Dilaudid in preparation for discharge home

## 2020-01-12 NOTE — ASSESSMENT & PLAN NOTE
PLAN:  - no post-discharge needs identified at this time  - plan to discharge home later today vs. tomorrow

## 2020-01-13 VITALS
DIASTOLIC BLOOD PRESSURE: 85 MMHG | RESPIRATION RATE: 19 BRPM | WEIGHT: 248.69 LBS | TEMPERATURE: 98 F | HEART RATE: 87 BPM | BODY MASS INDEX: 31.91 KG/M2 | SYSTOLIC BLOOD PRESSURE: 127 MMHG | OXYGEN SATURATION: 94 % | HEIGHT: 74 IN

## 2020-01-13 LAB
ANION GAP SERPL CALC-SCNC: 11 MMOL/L (ref 8–16)
BASOPHILS # BLD AUTO: 0.01 K/UL (ref 0–0.2)
BASOPHILS NFR BLD: 0.3 % (ref 0–1.9)
BUN SERPL-MCNC: 15 MG/DL (ref 8–23)
CALCIUM SERPL-MCNC: 9.5 MG/DL (ref 8.7–10.5)
CHLORIDE SERPL-SCNC: 102 MMOL/L (ref 95–110)
CO2 SERPL-SCNC: 26 MMOL/L (ref 23–29)
CREAT SERPL-MCNC: 1.5 MG/DL (ref 0.5–1.4)
DIFFERENTIAL METHOD: ABNORMAL
EOSINOPHIL # BLD AUTO: 0 K/UL (ref 0–0.5)
EOSINOPHIL NFR BLD: 1.3 % (ref 0–8)
ERYTHROCYTE [DISTWIDTH] IN BLOOD BY AUTOMATED COUNT: 13.5 % (ref 11.5–14.5)
EST. GFR  (AFRICAN AMERICAN): 56.9 ML/MIN/1.73 M^2
EST. GFR  (NON AFRICAN AMERICAN): 49.2 ML/MIN/1.73 M^2
GLUCOSE SERPL-MCNC: 124 MG/DL (ref 70–110)
HCT VFR BLD AUTO: 29.1 % (ref 40–54)
HGB BLD-MCNC: 9.4 G/DL (ref 14–18)
IMM GRANULOCYTES # BLD AUTO: 0 K/UL (ref 0–0.04)
IMM GRANULOCYTES NFR BLD AUTO: 0 % (ref 0–0.5)
LYMPHOCYTES # BLD AUTO: 0.5 K/UL (ref 1–4.8)
LYMPHOCYTES NFR BLD: 14.8 % (ref 18–48)
MAGNESIUM SERPL-MCNC: 1.7 MG/DL (ref 1.6–2.6)
MCH RBC QN AUTO: 30.5 PG (ref 27–31)
MCHC RBC AUTO-ENTMCNC: 32.3 G/DL (ref 32–36)
MCV RBC AUTO: 95 FL (ref 82–98)
MONOCYTES # BLD AUTO: 0.5 K/UL (ref 0.3–1)
MONOCYTES NFR BLD: 14.8 % (ref 4–15)
NEUTROPHILS # BLD AUTO: 2.2 K/UL (ref 1.8–7.7)
NEUTROPHILS NFR BLD: 68.8 % (ref 38–73)
NRBC BLD-RTO: 0 /100 WBC
PHOSPHATE SERPL-MCNC: 2.5 MG/DL (ref 2.7–4.5)
PLATELET # BLD AUTO: 154 K/UL (ref 150–350)
PMV BLD AUTO: 8.4 FL (ref 9.2–12.9)
POTASSIUM SERPL-SCNC: 3.8 MMOL/L (ref 3.5–5.1)
RBC # BLD AUTO: 3.08 M/UL (ref 4.6–6.2)
SODIUM SERPL-SCNC: 139 MMOL/L (ref 136–145)
URATE SERPL-MCNC: 7 MG/DL (ref 3.4–7)
WBC # BLD AUTO: 3.17 K/UL (ref 3.9–12.7)

## 2020-01-13 PROCEDURE — 25000003 PHARM REV CODE 250: Performed by: STUDENT IN AN ORGANIZED HEALTH CARE EDUCATION/TRAINING PROGRAM

## 2020-01-13 PROCEDURE — 84550 ASSAY OF BLOOD/URIC ACID: CPT

## 2020-01-13 PROCEDURE — 92526 ORAL FUNCTION THERAPY: CPT

## 2020-01-13 PROCEDURE — 25000003 PHARM REV CODE 250: Performed by: INTERNAL MEDICINE

## 2020-01-13 PROCEDURE — 83735 ASSAY OF MAGNESIUM: CPT

## 2020-01-13 PROCEDURE — 99239 PR HOSPITAL DISCHARGE DAY,>30 MIN: ICD-10-PCS | Mod: ,,, | Performed by: INTERNAL MEDICINE

## 2020-01-13 PROCEDURE — 97535 SELF CARE MNGMENT TRAINING: CPT

## 2020-01-13 PROCEDURE — 25000003 PHARM REV CODE 250: Performed by: SURGERY

## 2020-01-13 PROCEDURE — 84100 ASSAY OF PHOSPHORUS: CPT

## 2020-01-13 PROCEDURE — 99239 HOSP IP/OBS DSCHRG MGMT >30: CPT | Mod: ,,, | Performed by: INTERNAL MEDICINE

## 2020-01-13 PROCEDURE — 80048 BASIC METABOLIC PNL TOTAL CA: CPT

## 2020-01-13 PROCEDURE — 63600175 PHARM REV CODE 636 W HCPCS: Performed by: INTERNAL MEDICINE

## 2020-01-13 PROCEDURE — 85025 COMPLETE CBC W/AUTO DIFF WBC: CPT

## 2020-01-13 RX ORDER — ALLOPURINOL 100 MG/1
100 TABLET ORAL DAILY
Qty: 30 TABLET | Refills: 5 | Status: SHIPPED | OUTPATIENT
Start: 2020-01-13 | End: 2021-01-01

## 2020-01-13 RX ORDER — FLUCONAZOLE 200 MG/1
200 TABLET ORAL DAILY
Qty: 2 TABLET | Refills: 0 | Status: SHIPPED | OUTPATIENT
Start: 2020-01-14 | End: 2021-01-01 | Stop reason: ALTCHOICE

## 2020-01-13 RX ORDER — HEPARIN 100 UNIT/ML
5 SYRINGE INTRAVENOUS
Status: DISCONTINUED | OUTPATIENT
Start: 2020-01-13 | End: 2020-01-13 | Stop reason: HOSPADM

## 2020-01-13 RX ORDER — OXYCODONE HYDROCHLORIDE 10 MG/1
10 TABLET ORAL EVERY 4 HOURS PRN
Qty: 60 TABLET | Refills: 0 | Status: SHIPPED | OUTPATIENT
Start: 2020-01-13 | End: 2021-01-01

## 2020-01-13 RX ADMIN — CARVEDILOL 3.12 MG: 3.12 TABLET, FILM COATED ORAL at 08:01

## 2020-01-13 RX ADMIN — Medication 10 ML: at 08:01

## 2020-01-13 RX ADMIN — HEPARIN 500 UNITS: 100 SYRINGE at 12:01

## 2020-01-13 RX ADMIN — DIBASIC SODIUM PHOSPHATE, MONOBASIC POTASSIUM PHOSPHATE AND MONOBASIC SODIUM PHOSPHATE 2 TABLET: 852; 155; 130 TABLET ORAL at 09:01

## 2020-01-13 RX ADMIN — FLUCONAZOLE 200 MG: 200 TABLET ORAL at 09:01

## 2020-01-13 RX ADMIN — LEVOTHYROXINE SODIUM 50 MCG: 50 TABLET ORAL at 08:01

## 2020-01-13 NOTE — ASSESSMENT & PLAN NOTE
Patient was only taking indomethacin PRN at home. Flare observed during this admission (left toe), responded well with a dose of prednisone.    Plan  - Start Allopurinol 100 mg daily

## 2020-01-13 NOTE — ASSESSMENT & PLAN NOTE
Severe mucositis 2/2 recent chemo with resultant dysphagia/odynophagia.    PLAN:  - Resolving. Continue Verma's solution QID, Magic Mouthwash PRN  - 7 day course fluconazole to empirically cover for thrush - to finish 1/14/20  - per SLP okay for soft mech diet, nectar thick liquids - patient unable to tolerate nectar thick liquids - okay to drink milk, soup as tolerated given that staying hydrated is most important at this point  - oxycodone 10 mg q4h PRN, d/c IV Dilaudid in preparation for discharge home

## 2020-01-13 NOTE — PLAN OF CARE
Problem: SLP Goal  Goal: SLP Goal  Description  Speech Therapy Short Term Goals  Goal expected to be met by 1/22  1. Pt will participate in a Modified Barium Swallow Study to objectively r/o aspiration and determine the least restrictive and safest po diet. -MET  2. Patient will tolerate a mechanical soft diet and nectar thick liquids with swallowing strategies and precautions in place with minimal s/s of aspiration.   3. SLP will provide further education on all SLP recommendations, safe swallowing precautions, and strategies.   4. Patient will complete trial dysphagia therapy exercises x10 targeting BOT strength and epiglottic inversion.       Outcome: Ongoing, Progressing    Pt was seen for ST session.

## 2020-01-13 NOTE — PLAN OF CARE
AAOx4, VSS, afebrile- WBC 2.59, PO fluconazole started, remains on dysphagia mechanical soft diet;  Dukes solution continued 4x daily  Pt refusing pain meds @ this time, IV dilaudid d/c due to tentative discharge 1/13  Wife to remain @ bedside  No acute changes overnight, sleeping between care, non skid socks worn, call light within reach, will cont to monitor

## 2020-01-13 NOTE — PT/OT/SLP PROGRESS
Speech-Language Pathology Treatment    Patient Name:  Jerrod Mendez   MRN:  [id number]  Admitting Diagnosis: Mucositis    Recommendations:                General Recommendations:  Dysphagia therapy  Diet recommendations:  Mechanical soft, Liquid Diet Level: Nectar Thick   Aspiration Precautions: 1 bite/sip at a time, Assistance with thickening liquids, Double swallow with each bite/sip, HOB to 90 degrees, Meds crushed in puree, Monitor for s/s of aspiration, Small bites/sips and Strict aspiration precautions   General Precautions: Standard, aspiration, fall, nectar thick, dental soft  Communication strategies:  none    Subjective   Awake & alert, seated upright at edge of bed. Wife at bedside. NSG, pt & wife report that pt has been consuming thins. Pt with coughing throughout session.     Pain/Comfort:  · Pain Rating 1: 4/10  · Location 1: throat  · Pain Rating Post-Intervention 2: 4/10    Objective:     Has the patient been evaluated by SLP for swallowing?   Yes  Keep patient NPO? No   Current Respiratory Status: room air      Pt reports he has been practicing dysphagia exercises IND'ly. Pt demonstrated yulissa, effortful swallows, /k/ & /g/ all x5 each with good effort & ability. Given small cup sips of thin liquid, pt with coughing throughout trials of about 5oz. Coughing not immediately following sips but overall increased coughing during trials of thin. Pt with relatively decreased coughing during sips of nectar thick liquid. Pt tolerated solid trial without coughing. SLP educated pt on all recs including continued rec for nectar, instructions for thickening,  precautions, reviewed recs/results following recent MBSS, risks & s/s aspiration, role of SLP, POC, rec for PO ST following d/c, etc. Pt & spouse verbalized understanding.     Assessment:     Jerrod Mendez is a 64 y.o. male with an SLP diagnosis of Dysphagia.  He presents with risk of aspiration.    Goals:   Multidisciplinary Problems     SLP Goals         Problem: SLP Goal    Goal Priority Disciplines Outcome   SLP Goal     SLP Ongoing, Progressing   Description:  Speech Therapy Short Term Goals  Goal expected to be met by 1/22  1. Pt will participate in a Modified Barium Swallow Study to objectively r/o aspiration and determine the least restrictive and safest po diet. -MET  2. Patient will tolerate a mechanical soft diet and nectar thick liquids with swallowing strategies and precautions in place with minimal s/s of aspiration.   3. SLP will provide further education on all SLP recommendations, safe swallowing precautions, and strategies.   4. Patient will complete trial dysphagia therapy exercises x10 targeting BOT strength and epiglottic inversion.                        Plan:     · Patient to be seen:  3 x/week   · Plan of Care expires:  02/07/20  · Plan of Care reviewed with:  patient, spouse   · SLP Follow-Up:  Yes       Discharge recommendations:  outpatient speech therapy     Time Tracking:     SLP Treatment Date:   01/13/20  Speech Start Time:  1016  Speech Stop Time:  1033     Speech Total Time (min):  17 min    Billable Minutes: Treatment Swallowing Dysfunction 9 and Seld Care/Home Management Training 8    Cheyenne Kaiser CCC-SLP  01/13/2020

## 2020-01-13 NOTE — DISCHARGE SUMMARY
[facility]  Hematology/Oncology  Discharge Summary      Patient Name: Jerrod Mendez  MRN: [id number]  Admission Date: 1/7/2020  Hospital Length of Stay: 6 days  Discharge Date and Time:  01/13/2020 10:40 AM  Attending Physician: Juan Carlos Cooney MD   Discharging Provider: Samir Carlton MD  Primary Care Provider: Caroline Stapleton MD    HPI: Mr Mendez is a 65 YO male former smoker with stage 4B Right Tonsil cancer (diagnosed 11/19/20) currently on chemotherapy who presents with ongoing nausea and vomiting.      The patient has stage stage 4B R. 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  Plans for carbo/5FU/keytruda x 6 cycles, followed by maintenance 5FU. Most recently been on continuous chemo with pump since last Friday.      The patient reports this is his second round of chemo. The intial, he did not tolerate well. He reports he has had ongoing nausea/vomiting. In addition, has severe mouth pain due to mucositis. He is not able to toelrate po intake. He went to [facility], was discharged after receiving IV dilaudid, which helped his pain and allowed him to eat temporarily. He reports the symptoms returend, and he was advised by Dr Blount to come to the main [facility].      He denies any recent fevers, sick contacts, no unusual foods.   No abdominal pain. He reports the mucositis seems improved, he has difficulties swallowing and opening his mouth.    * No surgery found *     Hospital Course: 65 y/o male with stage IV tonsillar SCC recently started on carboplatin/5FU/Keytruda who presented to the ED with mucositis and N/V. Pain initially controlled with IV pain meds - transitioned to PO oxycodone with good response. Started on fluconazole for oral thrush. Duke's and magic mouth wash given with significant improvement in symptoms. He developed a gout flare (;eft  toe) that resolved with a dose of prednisone.  N/V resolved - able to tolerate some liquids and soft foods, deemed safe for discharge today. Will complete total of 7 days of fluconazol and continue with PO oxycodone PRN. Allopurinol will be started for gout flare prevention. Inflammation and pain to left toe resolved at time of discharge. Will continue with Duke's solution for mucositis at home.     Consults:     Significant Diagnostic Studies: Labs:   CMP   Recent Labs   Lab 01/12/20  0530 01/13/20  0718    139   K 3.8 3.8    102   CO2 28 26   * 124*   BUN 14 15   CREATININE 1.4 1.5*   CALCIUM 9.0 9.5   PROT 6.0  --    ALBUMIN 2.9*  --    BILITOT 1.7*  --    ALKPHOS 69  --    AST 11  --    ALT 10  --    ANIONGAP 9 11   ESTGFRAFRICA >60.0 56.9*   EGFRNONAA 53.5* 49.2*   , CBC   Recent Labs   Lab 01/12/20  0530 01/13/20  0718   WBC 2.59* 3.17*   HGB 8.6* 9.4*   HCT 26.5* 29.1*   * 154    and All labs within the past 24 hours have been reviewed    Pending Diagnostic Studies:     None        Final Active Diagnoses:    Diagnosis Date Noted POA    PRINCIPAL PROBLEM:  Mucositis [K12.30] 01/07/2020 Yes    Odynophagia [R13.10] 01/08/2020 Yes    Discharge planning issues [Z02.9] 01/08/2020 Not Applicable    Hyperbilirubinemia [E80.6] 01/07/2020 Yes    Hypothyroid [E03.9] 11/05/2019 Yes    CKD (chronic kidney disease) stage 3, GFR 30-59 ml/min [N18.3] 10/07/2019 Yes     Chronic    CINV (chemotherapy-induced nausea and vomiting) [R11.2, T45.1X5A] 12/26/2018 Yes    Dysphagia, oropharyngeal [R13.12] 12/20/2018 Yes    Gout [M10.9] 12/07/2018 Yes    Tonsillar cancer [C09.9] 12/05/2018 Yes    Type 2 diabetes mellitus with diabetic neuropathy, without long-term current use of insulin [E11.40] 11/23/2018 Yes     Chronic    Essential hypertension [I10] 11/23/2018 Yes     Chronic      Problems Resolved During this Admission:      Discharged Condition: good    Disposition: Home or Self  Care    Follow Up:    Patient Instructions:   No discharge procedures on file.  Medications:  Reconciled Home Medications:      Medication List      START taking these medications    allopurinol 100 MG tablet  Commonly known as:  ZYLOPRIM  Take 1 tablet (100 mg total) by mouth once daily.     fluconazole 200 MG Tab  Commonly known as:  DIFLUCAN  Take 1 tablet (200 mg total) by mouth once daily.  Start taking on:  January 14, 2020     magic mouthwash diphen/antac/lidoc/nysta  Take 10 mLs by mouth 4 (four) times daily as needed.     oxyCODONE 10 mg Tab immediate release tablet  Commonly known as:  ROXICODONE  Take 1 tablet (10 mg total) by mouth every 4 (four) hours as needed.        CHANGE how you take these medications    carvedilol 6.25 MG tablet  Commonly known as:  COREG  Take half tab a day for 1 week then take 1 tab daily thereafter  What changed:    · how much to take  · how to take this  · when to take this        CONTINUE taking these medications    aspirin 81 MG Chew  Take 81 mg by mouth once daily. Coated ASA     dronabinol 2.5 MG capsule  Commonly known as:  Marinol  Take 1 capsule (2.5 mg total) by mouth 2 (two) times daily before meals.     glipiZIDE 5 MG tablet  Commonly known as:  GLUCOTROL  Take 10 mg by mouth daily with breakfast.     HYDROcodone-acetaminophen 5-325 mg per tablet  Commonly known as:  NORCO  Take 1 tablet by mouth every 4 (four) hours as needed.     levothyroxine 50 MCG tablet  Commonly known as:  SYNTHROID  Take 50 mcg by mouth once daily.     magic mouthwash diphen/antac/lidoc  Swish and spit 15 ml by mouth every 4 hours as needed     nitroGLYCERIN 0.3 MG SL tablet  Commonly known as:  NITROSTAT  Place 0.3 mg under the tongue every 5 (five) minutes as needed for Chest pain.     ondansetron 4 MG Tbdl  Commonly known as:  ZOFRAN-ODT  Take 1 tablet (4 mg total) by mouth every 6 (six) hours as needed (for nausea).     pioglitazone 45 MG tablet  Commonly known as:  ACTOS  Take 45 mg by  mouth once daily.     promethazine 25 MG tablet  Commonly known as:  PHENERGAN  Take 1 tablet (25 mg total) by mouth every 6 (six) hours as needed for Nausea.     simvastatin 40 MG tablet  Commonly known as:  ZOCOR  Take 40 mg by mouth every evening.        STOP taking these medications    indomethacin 50 MG capsule  Commonly known as:  INDOCIN     linezolid 600 mg Tab  Commonly known as:  ZYVOX     morphine 10 mg/5 mL solution            Samir Carlton MD  Hematology/Oncology  [facility]

## 2020-01-13 NOTE — NURSING
Patient ready for dc home. Printed AVS reviewed and all meds have been delivered to bedside. Followup appts per AVS. Patient/ wife verbalize uneretsanding of dc instructions and how to contact MD for questions/ concerns.Implanted port deaccessed/ heparin flushed prior to needle removal. Awaiting transport request w/c.

## 2020-01-13 NOTE — PLAN OF CARE
"Pt. AAO x 4, WBC 2.59 afebrile, spouse at the bedside- interacting with the patient and participating in care.  Reports difficulty swallowing r/t mucositis-- mechanical soft diet continued and medications crushed  LR discontinued  Mg 1.5 on AM labs- 2g Mg rider given  Phosphorous 2.5 on AM labs-- PO supplement given  IV Diflucan dc'd and started on PO    C/o gout flair up in L. Foot and throat pain r/t mucositis-- Oxycodone 10 mg given and provided some relief--Verma's solution continued  Gave magic mouthwash prior to meal and patient stated "it helped a lot" and he was able to eat 2 cups of ramen  Safety precautions maintained throughout the shift, call light within reach, and nonslip socks when out of bed.  "

## 2020-01-14 ENCOUNTER — TELEPHONE (OUTPATIENT)
Dept: HEMATOLOGY/ONCOLOGY | Facility: CLINIC | Age: 65
End: 2020-01-14

## 2020-01-14 DIAGNOSIS — K12.30 MUCOSITIS: Primary | ICD-10-CM

## 2020-01-14 NOTE — TELEPHONE ENCOUNTER
Wife would like a refill for Ogle Solution sent to [facility].        ----- Message from Shannon Liao sent at 1/14/2020  4:12 PM CST -----  Contact: Daniel (wife)  Name of Who is Calling : Daniel (wife)    Daniel (wife) is requesting a call from staff in regards to medicationfluconazole (DIFLUCAN) 200 MG Tab [facility] not [facility] medication states  is still in extreme pain and not able to eat   .....Please contact to further discuss and advise.    Can the clinic reply by [facility] : No    What Number to Call Back :  [phone]

## 2020-01-20 ENCOUNTER — OFFICE VISIT (OUTPATIENT)
Dept: HEMATOLOGY/ONCOLOGY | Facility: CLINIC | Age: 65
End: 2020-01-20
Payer: COMMERCIAL

## 2020-01-20 ENCOUNTER — INFUSION (OUTPATIENT)
Dept: INFUSION THERAPY | Facility: OTHER | Age: 65
End: 2020-01-20
Attending: PHYSICIAN ASSISTANT
Payer: COMMERCIAL

## 2020-01-20 ENCOUNTER — LAB VISIT (OUTPATIENT)
Dept: LAB | Facility: OTHER | Age: 65
End: 2020-01-20
Attending: INTERNAL MEDICINE
Payer: COMMERCIAL

## 2020-01-20 VITALS
HEIGHT: 71 IN | TEMPERATURE: 97 F | SYSTOLIC BLOOD PRESSURE: 117 MMHG | OXYGEN SATURATION: 96 % | DIASTOLIC BLOOD PRESSURE: 67 MMHG | RESPIRATION RATE: 16 BRPM | BODY MASS INDEX: 33.55 KG/M2 | WEIGHT: 239.63 LBS | HEART RATE: 89 BPM

## 2020-01-20 DIAGNOSIS — C09.9 TONSILLAR CANCER: Primary | ICD-10-CM

## 2020-01-20 DIAGNOSIS — C09.9 TONSILLAR CANCER: ICD-10-CM

## 2020-01-20 DIAGNOSIS — G89.3 CANCER RELATED PAIN: ICD-10-CM

## 2020-01-20 DIAGNOSIS — N18.30 CKD (CHRONIC KIDNEY DISEASE) STAGE 3, GFR 30-59 ML/MIN: ICD-10-CM

## 2020-01-20 DIAGNOSIS — D64.81 ANTINEOPLASTIC CHEMOTHERAPY INDUCED ANEMIA: ICD-10-CM

## 2020-01-20 DIAGNOSIS — R22.1 NECK MASS: ICD-10-CM

## 2020-01-20 DIAGNOSIS — C77.0 SECONDARY MALIGNANT NEOPLASM OF CERVICAL LYMPH NODE: ICD-10-CM

## 2020-01-20 DIAGNOSIS — M10.272 ACUTE DRUG-INDUCED GOUT OF LEFT FOOT: ICD-10-CM

## 2020-01-20 DIAGNOSIS — I10 ESSENTIAL HYPERTENSION: ICD-10-CM

## 2020-01-20 DIAGNOSIS — T45.1X5A ANTINEOPLASTIC CHEMOTHERAPY INDUCED ANEMIA: ICD-10-CM

## 2020-01-20 DIAGNOSIS — E11.40 TYPE 2 DIABETES MELLITUS WITH DIABETIC NEUROPATHY, WITHOUT LONG-TERM CURRENT USE OF INSULIN: ICD-10-CM

## 2020-01-20 DIAGNOSIS — K12.30 MUCOSITIS: ICD-10-CM

## 2020-01-20 LAB
ALBUMIN SERPL BCP-MCNC: 3.3 G/DL (ref 3.5–5.2)
ALP SERPL-CCNC: 87 U/L (ref 55–135)
ALT SERPL W/O P-5'-P-CCNC: 18 U/L (ref 10–44)
ANION GAP SERPL CALC-SCNC: 11 MMOL/L (ref 8–16)
AST SERPL-CCNC: 14 U/L (ref 10–40)
BASOPHILS # BLD AUTO: 0.03 K/UL (ref 0–0.2)
BASOPHILS NFR BLD: 0.7 % (ref 0–1.9)
BILIRUB SERPL-MCNC: 1.3 MG/DL (ref 0.1–1)
BUN SERPL-MCNC: 23 MG/DL (ref 8–23)
CALCIUM SERPL-MCNC: 10.2 MG/DL (ref 8.7–10.5)
CHLORIDE SERPL-SCNC: 100 MMOL/L (ref 95–110)
CO2 SERPL-SCNC: 25 MMOL/L (ref 23–29)
CREAT SERPL-MCNC: 2.1 MG/DL (ref 0.5–1.4)
DIFFERENTIAL METHOD: ABNORMAL
EOSINOPHIL # BLD AUTO: 0.1 K/UL (ref 0–0.5)
EOSINOPHIL NFR BLD: 2.7 % (ref 0–8)
ERYTHROCYTE [DISTWIDTH] IN BLOOD BY AUTOMATED COUNT: 13.6 % (ref 11.5–14.5)
EST. GFR  (AFRICAN AMERICAN): 38 ML/MIN/1.73 M^2
EST. GFR  (NON AFRICAN AMERICAN): 33 ML/MIN/1.73 M^2
GLUCOSE SERPL-MCNC: 248 MG/DL (ref 70–110)
HCT VFR BLD AUTO: 30.7 % (ref 40–54)
HGB BLD-MCNC: 9.8 G/DL (ref 14–18)
IMM GRANULOCYTES # BLD AUTO: 0.1 K/UL (ref 0–0.04)
IMM GRANULOCYTES NFR BLD AUTO: 2.2 % (ref 0–0.5)
LYMPHOCYTES # BLD AUTO: 0.4 K/UL (ref 1–4.8)
LYMPHOCYTES NFR BLD: 9.4 % (ref 18–48)
MCH RBC QN AUTO: 29.8 PG (ref 27–31)
MCHC RBC AUTO-ENTMCNC: 31.9 G/DL (ref 32–36)
MCV RBC AUTO: 93 FL (ref 82–98)
MONOCYTES # BLD AUTO: 0.5 K/UL (ref 0.3–1)
MONOCYTES NFR BLD: 12.1 % (ref 4–15)
NEUTROPHILS # BLD AUTO: 3.3 K/UL (ref 1.8–7.7)
NEUTROPHILS NFR BLD: 72.9 % (ref 38–73)
NRBC BLD-RTO: 0 /100 WBC
PLATELET # BLD AUTO: 306 K/UL (ref 150–350)
PMV BLD AUTO: 8.4 FL (ref 9.2–12.9)
POTASSIUM SERPL-SCNC: 4.5 MMOL/L (ref 3.5–5.1)
PROT SERPL-MCNC: 7.3 G/DL (ref 6–8.4)
RBC # BLD AUTO: 3.29 M/UL (ref 4.6–6.2)
SODIUM SERPL-SCNC: 136 MMOL/L (ref 136–145)
T4 FREE SERPL-MCNC: 1.19 NG/DL (ref 0.71–1.51)
TSH SERPL DL<=0.005 MIU/L-ACNC: 5.6 UIU/ML (ref 0.4–4)
WBC # BLD AUTO: 4.46 K/UL (ref 3.9–12.7)

## 2020-01-20 PROCEDURE — 99215 OFFICE O/P EST HI 40 MIN: CPT | Mod: S$GLB,,, | Performed by: INTERNAL MEDICINE

## 2020-01-20 PROCEDURE — 3074F SYST BP LT 130 MM HG: CPT | Mod: CPTII,S$GLB,, | Performed by: INTERNAL MEDICINE

## 2020-01-20 PROCEDURE — 3044F HG A1C LEVEL LT 7.0%: CPT | Mod: CPTII,S$GLB,, | Performed by: INTERNAL MEDICINE

## 2020-01-20 PROCEDURE — 3074F PR MOST RECENT SYSTOLIC BLOOD PRESSURE < 130 MM HG: ICD-10-PCS | Mod: CPTII,S$GLB,, | Performed by: INTERNAL MEDICINE

## 2020-01-20 PROCEDURE — 80053 COMPREHEN METABOLIC PANEL: CPT

## 2020-01-20 PROCEDURE — 3078F PR MOST RECENT DIASTOLIC BLOOD PRESSURE < 80 MM HG: ICD-10-PCS | Mod: CPTII,S$GLB,, | Performed by: INTERNAL MEDICINE

## 2020-01-20 PROCEDURE — 99999 PR PBB SHADOW E&M-EST. PATIENT-LVL III: ICD-10-PCS | Mod: PBBFAC,,, | Performed by: INTERNAL MEDICINE

## 2020-01-20 PROCEDURE — 84439 ASSAY OF FREE THYROXINE: CPT

## 2020-01-20 PROCEDURE — 99999 PR PBB SHADOW E&M-EST. PATIENT-LVL III: CPT | Mod: PBBFAC,,, | Performed by: INTERNAL MEDICINE

## 2020-01-20 PROCEDURE — 84443 ASSAY THYROID STIM HORMONE: CPT

## 2020-01-20 PROCEDURE — 63600175 PHARM REV CODE 636 W HCPCS: Mod: JG | Performed by: INTERNAL MEDICINE

## 2020-01-20 PROCEDURE — 3008F BODY MASS INDEX DOCD: CPT | Mod: CPTII,S$GLB,, | Performed by: INTERNAL MEDICINE

## 2020-01-20 PROCEDURE — 99215 PR OFFICE/OUTPT VISIT, EST, LEVL V, 40-54 MIN: ICD-10-PCS | Mod: S$GLB,,, | Performed by: INTERNAL MEDICINE

## 2020-01-20 PROCEDURE — 3008F PR BODY MASS INDEX (BMI) DOCUMENTED: ICD-10-PCS | Mod: CPTII,S$GLB,, | Performed by: INTERNAL MEDICINE

## 2020-01-20 PROCEDURE — 96413 CHEMO IV INFUSION 1 HR: CPT

## 2020-01-20 PROCEDURE — 96361 HYDRATE IV INFUSION ADD-ON: CPT

## 2020-01-20 PROCEDURE — 85025 COMPLETE CBC W/AUTO DIFF WBC: CPT

## 2020-01-20 PROCEDURE — 3078F DIAST BP <80 MM HG: CPT | Mod: CPTII,S$GLB,, | Performed by: INTERNAL MEDICINE

## 2020-01-20 PROCEDURE — 36415 COLL VENOUS BLD VENIPUNCTURE: CPT

## 2020-01-20 PROCEDURE — 96417 CHEMO IV INFUS EACH ADDL SEQ: CPT

## 2020-01-20 PROCEDURE — 3044F PR MOST RECENT HEMOGLOBIN A1C LEVEL <7.0%: ICD-10-PCS | Mod: CPTII,S$GLB,, | Performed by: INTERNAL MEDICINE

## 2020-01-20 PROCEDURE — 96367 TX/PROPH/DG ADDL SEQ IV INF: CPT

## 2020-01-20 RX ORDER — LIDOCAINE AND PRILOCAINE 25; 25 MG/G; MG/G
CREAM TOPICAL
COMMUNITY
Start: 2020-01-03

## 2020-01-20 RX ORDER — SODIUM CHLORIDE 0.9 % (FLUSH) 0.9 %
10 SYRINGE (ML) INJECTION
Status: DISCONTINUED | OUTPATIENT
Start: 2020-01-20 | End: 2020-01-20 | Stop reason: HOSPADM

## 2020-01-20 RX ORDER — OXYCODONE AND ACETAMINOPHEN 5; 325 MG/1; MG/1
TABLET ORAL
COMMUNITY
End: 2021-01-01

## 2020-01-20 RX ORDER — HEPARIN 100 UNIT/ML
500 SYRINGE INTRAVENOUS
Status: CANCELLED | OUTPATIENT
Start: 2020-01-20

## 2020-01-20 RX ORDER — HEPARIN 100 UNIT/ML
500 SYRINGE INTRAVENOUS
Status: DISCONTINUED | OUTPATIENT
Start: 2020-01-20 | End: 2020-01-20 | Stop reason: HOSPADM

## 2020-01-20 RX ORDER — SODIUM CHLORIDE 9 MG/ML
INJECTION, SOLUTION INTRAVENOUS CONTINUOUS
Status: CANCELLED
Start: 2020-01-20

## 2020-01-20 RX ORDER — MORPHINE SULFATE ORAL SOLUTION 10 MG/5ML
5 SOLUTION ORAL
Qty: 500 ML | Refills: 0 | Status: SHIPPED | OUTPATIENT
Start: 2020-01-20 | End: 2020-01-20 | Stop reason: SDUPTHER

## 2020-01-20 RX ORDER — SODIUM CHLORIDE 0.9 % (FLUSH) 0.9 %
10 SYRINGE (ML) INJECTION
Status: CANCELLED | OUTPATIENT
Start: 2020-01-20

## 2020-01-20 RX ORDER — MORPHINE SULFATE ORAL SOLUTION 10 MG/5ML
5 SOLUTION ORAL
Qty: 500 ML | Refills: 0 | Status: SHIPPED | OUTPATIENT
Start: 2020-01-20 | End: 2021-01-01

## 2020-01-20 RX ORDER — COLCHICINE 0.6 MG/1
0.6 TABLET ORAL SEE ADMIN INSTRUCTIONS
Qty: 8 TABLET | Refills: 0 | Status: SHIPPED | OUTPATIENT
Start: 2020-01-20 | End: 2020-01-20 | Stop reason: SDUPTHER

## 2020-01-20 RX ORDER — SODIUM CHLORIDE 9 MG/ML
INJECTION, SOLUTION INTRAVENOUS CONTINUOUS
Status: DISCONTINUED | OUTPATIENT
Start: 2020-01-20 | End: 2020-01-20 | Stop reason: HOSPADM

## 2020-01-20 RX ORDER — COLCHICINE 0.6 MG/1
0.6 TABLET ORAL SEE ADMIN INSTRUCTIONS
Qty: 8 TABLET | Refills: 0 | Status: SHIPPED | OUTPATIENT
Start: 2020-01-20 | End: 2021-01-01 | Stop reason: ALTCHOICE

## 2020-01-20 RX ADMIN — HEPARIN 500 UNITS: 100 SYRINGE at 02:01

## 2020-01-20 RX ADMIN — SODIUM CHLORIDE 200 MG: 9 INJECTION, SOLUTION INTRAVENOUS at 12:01

## 2020-01-20 RX ADMIN — SODIUM CHLORIDE: 0.9 INJECTION, SOLUTION INTRAVENOUS at 11:01

## 2020-01-20 RX ADMIN — PALONOSETRON HYDROCHLORIDE: 0.25 INJECTION, SOLUTION INTRAVENOUS at 12:01

## 2020-01-20 RX ADMIN — CARBOPLATIN 405 MG: 10 INJECTION, SOLUTION INTRAVENOUS at 01:01

## 2020-01-20 RX ADMIN — SODIUM CHLORIDE: 0.9 INJECTION, SOLUTION INTRAVENOUS at 12:01

## 2020-01-20 RX ADMIN — APREPITANT 130 MG: 130 INJECTION, EMULSION INTRAVENOUS at 01:01

## 2020-01-20 NOTE — PLAN OF CARE
Keytruda/Carbo infusion complete. Pt tolerated well. VSS. NAD. Port to left chest de-accessed after heparinized per protocol.  AVS provided. Pt verbalized understanding of discharge instructions before leaving with family member.

## 2020-01-20 NOTE — Clinical Note
Please let chemo nurse know he is only getting carbo/keytruda today. No 5FU. Cancel pump removal this Friday. Return in 1 week with CBC, CMP, then see Tamia. Please do PA for liquid morphine. RTC on 2/17 with CBC, CMP, TSH, see me, then get carbo/5FU/Keytruda, return on 2/21 to remove pump.

## 2020-01-20 NOTE — PROGRESS NOTES
Subjective:       Patient ID: Jerrod Mendez is a 64 y.o. male.     Chief Complaint: follow up for R tonsil cancer     Diagnosis:  1. 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 Recurrent disease to the right supraclavicular LN found on 10/4/2019.      Oncologic History:  1. Mr Mendez is a 65 yo man with locally advanced R tonsillar cancer s/p chemoradiation. [place] [facility] He presented with a lump behind his right ear in Nov 2018. Laryngoscopy on 11/12/18 showed fullness in the right tonsil. CT neck on 11/13/2018 showed a 4.6 x 3.4 x 6.6 cm mass with areas of necrosis medial to the right parotid gland and extending inferior and posterior to the submandibular and posterior to the sternocleidomastoid compressing the jugular vein. Prominent adjacent posterior jugular nodes on the right 1.8 cm. Prominence of the soft tissue which showed asmymetric in the region of the right oropharynx without a discrete mass. Compression of the jugular vein and internal carotid artery by the mass. Prominent left posterior jugular node 1.4 cm. US guided FNA on 11/19/2018 showed squamous cell carcinoma, unable to test HPV. He was at the ER on 11/22/18 for trouble speaking. MRA brain/neck and MRI brain was unremarkable. It was felt that he had a TIA. PET scan on 12/10/18 showed Hypermetabolic right tonsillar mass with multiple metastatic right cervical lymph nodes, and single metastatic left cervical node.  No definite evidence of metastatic disease within the chest, abdomen or pelvis. Punctate nodular densities within the right upper lobe, questionable 4 mm cavitary nodule.    2. He was treated with concurrent chemoradiation with high dose cisplatin 100 mg/m2. He received two doses of cisplatin. Did not get dose 3 because of kidney failure. Completed radiation in Feb 2019.   3. Surveillance CT scan on 10/4/19 showed R supraclavicular lymphadenopathy which is new. That is when I saw him the  "first time in the clinic. We discussed that this is recurrent/stage IV tonsil cancer. Discussed with Dr Romano who feels the supraclavicular LAD is resectable. RT was deemed not an option by Dr Rodriguez. He was referred to Dr Romano. He underwent Modified neck dissection involving levels 2 through 5 with sacrifice of the spinal accessory nerve and ligation of the right internal jugular vein on 11/6/2019. Post-op note: "Superiorly (of the internal jugular vein), at the level of the skull base there was noted to be tissue in this region worrisome for viable tumor.  This was sampled and sent to pathology and confirmed to be consistent with squamous cell carcinoma.  Given the location of this tumor, affected breech the deep cervical fascia and the need to resect the carotid artery and vagus nerve in order to clear it, we had cleared his disease unresectable."  4. Pathology: 1) Tissue at right skull base, biopsy: Invasive squamous cell carcinoma, nonkeratinizing with basaloid features. 2) Right neck, level 5, dissection: Invasive squamous cell carcinoma, nonkeratinizing with basaloid features, involving fibroadipose tissues (5.0 cm metastatic deposit). One separate small lymph node negative for malignancy (0/1).  5. Patient had draining postop neck wound 2/2 seroma vs Chyle leak. Was seen by Dr Romano.   6. PET scan on 12/23/19 showed "hypermetabolic 16 x 9 mm lesion in the region of the right carotid space at the level of C2 with SUV max 6.07 on image 63.  There are also 3-4 hypermetabolic right level 4 lymph nodes measuring up to 1.5 cm in short axis on image 90 with an SUV of 9.1 for the conglomerate.  There are postsurgical changes in the surrounding right neck including surgical clips and a focal gas collection without associated fluid.  There is no discrete hypermetabolic activity at the skull base"  7. Carbo/5FU/keytruda cycle 1 on 12/30/2019. Patient was hospitalized 1/7/20-1/13/20 for severe mucositis causing " dysphagia and dehydration.      Interval History:   Mr Mendez returns for follow up. Had cycle 1 carbo/5FU/keytruda on 19. Developed severe mucositis causing dysphagia and dehydration. He was not able to tolerate fluids at that time and was hospitalized from 2020-2020. Mucositis has improved and he has been eating normal over the past 3-4 days. Not able to fill out liquid morphine and Duke's solution locally. Had gout flare up in the hospital and was put on allopurinol. Gout pain is bad today.      ECO     ROS:   A ten-point system review is obtained and negative except for what was stated in the Interval History.      Physical Examination:   Vital signs reviewed.   General: well hydrated, well developed, in no acute distress  HEENT: normocephalic, PERRLA, EOMI, anicteric sclerae, oropharynx clear, no mucositis  Neck: supple, no JVD, thyromegaly. No cervical or supraclavicular LAD. No open wounds.   Lungs: clear breath sounds bilaterally, no wheezing, rales, or rhonchi  Heart: RRR, no M/R/G  Abdomen: soft, no tenderness, non-distended, no hepatosplenomegaly, mass, or hernia. BS present  Extremities: no clubbing, cyanosis, or edema  Skin: +mild erythema and edema of the 2nd-4th metatarsal joints of the left foot.   Neuro: alert and oriented x 4, no focal neuro deficit  Psych: pleasant and appropriate mood and affect     Objective:      Laboratory Data:  Labs reviewed. mild anemia. Cr 2.1. Calculated CrCl 56 mL/min.         Assessment and Plan:      1. Tonsillar cancer    2. Secondary malignant neoplasm of cervical lymph node    3. Acute drug-induced gout of left foot    4. Mucositis    5. Cancer related pain    6. Antineoplastic chemotherapy induced anemia    7. Type 2 diabetes mellitus with diabetic neuropathy, without long-term current use of insulin    8. Essential hypertension    9. CKD (chronic kidney disease) stage 3, GFR 30-59 ml/min        1.2  - Mr Mendez is a 65 yo man with originally  stage IVB (TxN3) R tonsil cancer, diagnosed on 11/19/2018, s/p chemoradiation with high dose cisplatin completed in Feb 2019. He developed recurrence in R supraclavicular LN in Oct 2019. Underwent a modified neck dissection involving levels 2 through 5 with sacrifice of the spinal accessory nerve and ligation of the right internal jugular vein on 11/6/2019 by Dr Romano. Intra-operatively, he was found to have unresectable tissue near the internal jugular vein at the level of the skull base, which was biopsy-proven to be squamous cell carcinoma.   - started palliative chemoimmunotherapy with carbo/5FU/keytruda on 12/30/19. Had severe mucositis from 5FU infusion resulting in a one-week hospitalization  - discussed with patient and wife that the severe mucositis is likely from 5FU infusion and possibly radiation recall. Will decreased 5FU dose in the future. For cycle 2 today, as his daughter's wedding is going to be on Feb 14, he prefers not having the 5FU infusion with this cycle  - carbo/keytruda only today  - see PA next week for toxicity check  - Daughter's wedding is on Feb 14. RTC on 2/17 for cycle 3 carbo/5FU/keytruda.      3.  - was started on allopurinol daily in the hospital  - CrCl 56 mL/min. According to uptodate no need for dose reduction of colchicine in treating acute gout for CrCl>30 mL/min. Will give 1.8 mg x 1, then 0.6 mg one hour later on day 1, followed by 0.6 mg daily starting day 2 until pain resolves.      4.   - 2/2 5FU. Much improved  - sent Verma's solution to [facility] pharmacy    5.  - not able to take pills when he has severe mucositis from chemo. Sent liquid morphine to [facility] [place] pharmacy    6.  - Hb stable  - monitor    7.  - BS ok  - monitor    8.   - BP good  - c/w current meds    9.  - CrCl 56 mL/min  - encouraged oral hydration  - monitor

## 2020-01-24 NOTE — PROGRESS NOTES
Subjective:       Patient ID: Jerrod Mendez is a 64 y.o. male.    Chief Complaint: F/u for R tonsil cancer    History:  Past Medical History:   Diagnosis Date    Back pain     Brain aneurysm     Cancer     tonsils    Coronary artery disease     Diabetes mellitus     Gout     High cholesterol     Hypertension     Oropharyngeal dysphagia     JIM (obstructive sleep apnea)     Stroke     tia    TIA (transient ischemic attack)      Past Surgical History:   Procedure Laterality Date    CHOLECYSTECTOMY      CORONARY ANGIOPLASTY WITH STENT PLACEMENT      DISSECTION OF NECK Right 11/6/2019    Procedure: DISSECTION, NECK;  Surgeon: Stu Romano MD;  Location: [facility] OR [address] [place] [address];  Service: ENT;  Laterality: Right;    ESOPHAGOGASTRODUODENOSCOPY      FINE NEEDLE ASPIRATION Right     neck mass    GASTROSTOMY TUBE PLACEMENT      LIPOMA RESECTION      back    PEG TUBE REMOVAL N/A 5/28/2019    Procedure: REMOVAL, PEG TUBE;  Surgeon: Milton Friedman MD;  Location: [facility];  Service: Endoscopy;  Laterality: N/A;    PORTACATH PLACEMENT           Tonsillar cancer    12/5/2018 Initial Diagnosis     Tonsillar cancer      10/8/2019 -  Chemotherapy     Treatment Summary   Plan Name: OP HEAD AND NECK PEMBROLIZUMAB FLUOROURACIL CARBOPLATIN Q3W  Treatment Goal: Control  Status: Active  Start Date: 12/30/2019  End Date: 12/10/2021 (Planned)  Provider: Serena Blount MD  Chemotherapy: CARBOplatin (PARAPLATIN) 480 mg in sodium chloride 0.9% 250 mL chemo infusion, 480 mg (100 % of original dose 481.5 mg), Intravenous, Clinic/HOD 1 time, 2 of 6 cycles  Dose modification:   (original dose 481.5 mg, Cycle 1)  Administration: 480 mg (12/30/2019), 405 mg (1/20/2020)  fluorouracil 1,000 mg/m2/day = 9,760 mg in sodium chloride 0.9% 240 mL chemo infusion, 1,000 mg/m2/day = 9,760 mg (100 % of original dose 1,000 mg/m2/day), Intravenous, Over 96 hours, 1 of 5 cycles  Dose modification: 1,000 mg/m2/day (original dose 1,000  mg/m2/day, Cycle 1), 800 mg/m2/day (original dose 1,000 mg/m2/day, Cycle 3)  Administration: 9,760 mg (12/30/2019)  pembrolizumab (KEYTRUDA) 200 mg in sodium chloride 0.9% 100 mL chemo infusion, 200 mg, Intravenous, Clinic/HOD 1 time, 2 of 35 cycles  Administration: 200 mg (12/30/2019), 200 mg (1/20/2020)      11/6/2019 Surgery     Modified neck dissection involving levels 2 through 5 with sacrifice of the spinal accessory nerve and ligation of the right internal jugular vein          Allergies:  Review of patient's allergies indicates:  No Known Allergies     HPI Diagnosis:  1. 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 Recurrent disease to the right supraclavicular LN found on 10/4/2019.      Oncologic History:  1. Mr Mendez is a 63 yo man with locally advanced R tonsillar cancer s/p chemoradiation. He was previously treated at [facility] [place]. He presented with a lump behind his right ear in Nov 2018. Laryngoscopy on 11/12/18 showed fullness in the right tonsil. CT neck on 11/13/2018 showed a 4.6 x 3.4 x 6.6 cm mass with areas of necrosis medial to the right parotid gland and extending inferior and posterior to the submandibular and posterior to the sternocleidomastoid compressing the jugular vein. Prominent adjacent posterior jugular nodes on the right 1.8 cm. Prominence of the soft tissue which showed asmymetric in the region of the right oropharynx without a discrete mass. Compression of the jugular vein and internal carotid artery by the mass. Prominent left posterior jugular node 1.4 cm. US guided FNA on 11/19/2018 showed squamous cell carcinoma, unable to test HPV. He was at the ER on 11/22/18 for trouble speaking. MRA brain/neck and MRI brain was unremarkable. It was felt that he had a TIA. PET scan on 12/10/18 showed Hypermetabolic right tonsillar mass with multiple metastatic right cervical lymph nodes, and single metastatic left cervical node.  No definite evidence of metastatic  "disease within the chest, abdomen or pelvis. Punctate nodular densities within the right upper lobe, questionable 4 mm cavitary nodule.    2. He was treated with concurrent chemoradiation with high dose cisplatin 100 mg/m2. He received two doses of cisplatin. Did not get dose 3 because of kidney failure. Completed radiation in Feb 2019.   3. Surveillance CT scan on 10/4/19 showed R supraclavicular lymphadenopathy which is new. That is when I saw him the first time in the clinic. We discussed that this is recurrent/stage IV tonsil cancer. Discussed with Dr Romano who feels the supraclavicular LAD is resectable. RT was deemed not an option by Dr Rodriguez. He was referred to Dr Romano. He underwent Modified neck dissection involving levels 2 through 5 with sacrifice of the spinal accessory nerve and ligation of the right internal jugular vein on 11/6/2019. Post-op note: "Superiorly (of the internal jugular vein), at the level of the skull base there was noted to be tissue in this region worrisome for viable tumor.  This was sampled and sent to pathology and confirmed to be consistent with squamous cell carcinoma.  Given the location of this tumor, affected breech the deep cervical fascia and the need to resect the carotid artery and vagus nerve in order to clear it, we had cleared his disease unresectable."  4. Pathology: 1) Tissue at right skull base, biopsy: Invasive squamous cell carcinoma, nonkeratinizing with basaloid features. 2) Right neck, level 5, dissection: Invasive squamous cell carcinoma, nonkeratinizing with basaloid features, involving fibroadipose tissues (5.0 cm metastatic deposit). One separate small lymph node negative for malignancy (0/1).  5. Patient had draining postop neck wound 2/2 seroma vs Chyle leak. Was seen by Dr Romano.   6. PET scan on 12/23/19 showed "hypermetabolic 16 x 9 mm lesion in the region of the right carotid space at the level of C2 with SUV max 6.07 on image 63.  There are also 3-4 " "hypermetabolic right level 4 lymph nodes measuring up to 1.5 cm in short axis on image 90 with an SUV of 9.1 for the conglomerate.  There are postsurgical changes in the surrounding right neck including surgical clips and a focal gas collection without associated fluid.  There is no discrete hypermetabolic activity at the skull base"  7. Carbo/5FU/keytruda cycle 1 on 12/30/2019. Patient was hospitalized 1/7/20-1/13/20 for severe mucositis causing dysphagia and dehydration.      Interval History 1/27/2020: Mr Mendez returns for follow up. Had cycle 1 carbo/5FU/keytruda on 12/30/19. Developed severe mucositis causing dysphagia and dehydration. He was not able to tolerate fluids at that time and was hospitalized from 1/7/2020-1/13/2020. Mucositis had improved and he was eating normal. Last chemotherapy was given without 5-FU. He is doing very well today and pain is well controlled. Mucositis has resolved without evidence for recurrence. Not able to fill out liquid morphine and [facility]'s solution locally. Had gout flare up in the hospital and was put on allopurinol. Gout pain has resolved. He does continue to have moderate pain of the R shoulder that is controlled with medication. This has not worsened. ECOG status is 0.     ECOG:  ECOG SCORE            Review of Systems   Constitutional: Negative for activity change, appetite change, chills, fatigue, fever and unexpected weight change.   HENT: Negative for congestion, mouth sores, nosebleeds, sinus pressure, sinus pain, sore throat and trouble swallowing.    Eyes: Negative for photophobia, pain and visual disturbance.   Respiratory: Negative for apnea, cough, chest tightness, shortness of breath and wheezing.    Cardiovascular: Negative for chest pain and leg swelling.   Gastrointestinal: Negative for abdominal distention, abdominal pain, anal bleeding, blood in stool, constipation, diarrhea, nausea, rectal pain and vomiting.   Genitourinary: Negative for dysuria, flank " pain and hematuria.   Musculoskeletal: Positive for arthralgias. Negative for back pain and gait problem.        R shoulder pain   Skin: Negative for color change and rash.   Neurological: Negative for dizziness, syncope, weakness, light-headedness, numbness and headaches.   Hematological: Negative for adenopathy.   Psychiatric/Behavioral: Negative for behavioral problems, confusion, sleep disturbance and suicidal ideas. The patient is not nervous/anxious.        Objective:      Physical Exam   Constitutional: He is oriented to person, place, and time. He appears well-developed and well-nourished. He is active and cooperative.  Non-toxic appearance. He does not have a sickly appearance. He does not appear ill. No distress.   HENT:   Head: Normocephalic and atraumatic.   Right Ear: Hearing normal. No tenderness.   Left Ear: Hearing normal. No tenderness.   Nose: No rhinorrhea, sinus tenderness or nasal deformity. No epistaxis.  No foreign bodies.   Mouth/Throat: Uvula is midline, oropharynx is clear and moist and mucous membranes are normal. Normal dentition. No dental abscesses. No oropharyngeal exudate or tonsillar abscesses.   Eyes: Pupils are equal, round, and reactive to light. Conjunctivae, EOM and lids are normal. No foreign body present in the right eye. No foreign body present in the left eye. No scleral icterus.   Neck: Trachea normal and normal range of motion. Neck supple. No muscular tenderness present. No tracheal deviation and normal range of motion present.   Cardiovascular: Normal rate, regular rhythm and normal heart sounds. Exam reveals no gallop and no friction rub.   No murmur heard.  Pulmonary/Chest: Effort normal and breath sounds normal. No accessory muscle usage or stridor. No respiratory distress. He has no decreased breath sounds. He has no wheezes. He has no rhonchi. He has no rales. He exhibits no tenderness.   Abdominal: Soft. Bowel sounds are normal. He exhibits no distension, no  ascites and no mass. There is no tenderness. There is no rigidity, no rebound and no guarding. No hernia.   Musculoskeletal: Normal range of motion. He exhibits no edema or tenderness.        Right shoulder: He exhibits normal range of motion, no tenderness, no swelling, no deformity, no pain and no spasm.   Lymphadenopathy:        Head (right side): No submental and no submandibular adenopathy present.        Head (left side): No submental and no submandibular adenopathy present.     He has no cervical adenopathy.   Neurological: He is alert and oriented to person, place, and time. He has normal strength. He displays normal reflexes. No cranial nerve deficit or sensory deficit. Coordination and gait normal.   Skin: Skin is warm, dry and intact. Capillary refill takes less than 2 seconds. No ecchymosis, no petechiae and no rash noted. He is not diaphoretic. No erythema.   Psychiatric: He has a normal mood and affect. His speech is normal and behavior is normal. Judgment and thought content normal.   Nursing note and vitals reviewed.      Results:   Contains abnormal data CBC auto differential   Order: [id number]   Status:  Final result   Visible to patient:  No (Not Released)   Next appt:  02/17/2020 at 08:30 AM in Lab (LAB, SAME DAY [facility])   Dx:  Tonsillar cancer    Ref Range & Units 13:34   WBC 3.90 - 12.70 K/uL 5.70    RBC 4.60 - 6.20 M/uL 3.15Low     Hemoglobin 14.0 - 18.0 g/dL 9.6Low     Hematocrit 40.0 - 54.0 % 30.3Low     Mean Corpuscular Volume 82 - 98 fL 96    Mean Corpuscular Hemoglobin 27.0 - 31.0 pg 30.5    Mean Corpuscular Hemoglobin Conc 32.0 - 36.0 g/dL 31.7Low     RDW 11.5 - 14.5 % 14.0    Platelets 150 - 350 K/uL 227    MPV 9.2 - 12.9 fL 8.8Low     Immature Granulocytes 0.0 - 0.5 % 0.9High     Gran # (ANC) 1.8 - 7.7 K/uL 4.5    Immature Grans (Abs) 0.00 - 0.04 K/uL 0.05High     Comment: Mild elevation in immature granulocytes is non specific and   can be seen in a variety of conditions including  stress response,   acute inflammation, trauma and pregnancy. Correlation with other   laboratory and clinical findings is essential.    Lymph # 1.0 - 4.8 K/uL 0.6Low     Mono # 0.3 - 1.0 K/uL 0.4    Eos # 0.0 - 0.5 K/uL 0.1    Baso # 0.00 - 0.20 K/uL 0.03    nRBC 0 /100 WBC 0    Gran% 38.0 - 73.0 % 78.3High     Lymph% 18.0 - 48.0 % 10.9Low     Mono% 4.0 - 15.0 % 7.5    Eosinophil% 0.0 - 8.0 % 1.9    Basophil% 0.0 - 1.9 % 0.5    Differential Method  Automated            Comprehensive metabolic panel   Order: [id number]   Status:  Final result   Visible to patient:  No (Not Released)   Next appt:  02/17/2020 at 08:30 AM in Lab (LAB, SAME DAY [facility])   Dx:  Tonsillar cancer    Ref Range & Units 13:34   Sodium 136 - 145 mmol/L 141    Potassium 3.5 - 5.1 mmol/L 4.4    Chloride 95 - 110 mmol/L 104    CO2 23 - 29 mmol/L 27    Glucose 70 - 110 mg/dL 228High     BUN, Bld 8 - 23 mg/dL 25High     Creatinine 0.5 - 1.4 mg/dL 1.7High     Calcium 8.7 - 10.5 mg/dL 9.6    Total Protein 6.0 - 8.4 g/dL 7.1    Albumin 3.5 - 5.2 g/dL 3.7    Total Bilirubin 0.1 - 1.0 mg/dL 0.9    Comment: For infants and newborns, interpretation of results should be based   on gestational age, weight and in agreement with clinical   observations.   Premature Infant recommended reference ranges:   Up to 24 hours.............<8.0 mg/dL   Up to 48 hours............<12.0 mg/dL   3-5 days..................<15.0 mg/dL   6-29 days.................<15.0 mg/dL    Alkaline Phosphatase 55 - 135 U/L 99    AST 10 - 40 U/L 14    ALT 10 - 44 U/L 20    Anion Gap 8 - 16 mmol/L 10    eGFR if African American >60 mL/min/1.73 m^2 48.9Abnormal     eGFR if non African American >60 mL/min/1.73 m^2 42.3Abnormal     Comment: Calculation used to obtain the estimated glomerular filtration   rate (eGFR) is the CKD-EPI equation.             Assessment:   Mr Mendez is a 65 yo man with originally stage IVB (TxN3) R tonsil cancer, diagnosed on 11/19/2018, s/p chemoradiation with high dose  cisplatin completed in Feb 2019. He developed recurrence in R supraclavicular LN in Oct 2019. Underwent a modified neck dissection involving levels 2 through 5 with sacrifice of the spinal accessory nerve and ligation of the right internal jugular vein on 11/6/2019 by Dr Romano. Intra-operatively, he was found to have unresectable tissue near the internal jugular vein at the level of the skull base, which was biopsy-proven to be squamous cell carcinoma.     1. Tonsillar cancer    2. Secondary malignant neoplasm of cervical lymph node    3. Acute drug-induced gout of left foot    4. Mucositis    5. Cancer related pain    6. Antineoplastic chemotherapy induced anemia    7. Type 2 diabetes mellitus with diabetic neuropathy, without long-term current use of insulin    8. Essential hypertension    9. CKD (chronic kidney disease) stage 3, GFR 30-59 ml/min      Plan:   1,2. started palliative chemoimmunotherapy with carbo/5FU/keytruda on 12/30/19. Had severe mucositis from 5FU infusion resulting in a one-week hospitalization  -Dr. Blount discussed with patient and wife that the severe mucositis is likely from 5FU infusion and possibly radiation recall. Will decrease 5FU dose in the future. For cycle 2, 5-FU was held as his Daughter's wedding is on Feb 14. RTC on 2/17 for cycle 3 carbo/5FU/keytruda.      3. Resolved with colchicine. Will continue on Allopurinol daily.    4. 2/2 5FU. Much improved without evidence for recurrence at this time. Pte is able to swallow without difficulty. Has [facility]'s solution on hand for next cycle. Was advised to start it on day 1 of chemotherapy.      5. Well controlled. Pte will  re-fill for liquid morphine.      6.  Hb stable. monitor     7. BS improved since previous visit. Will continue to monitor     8. BP good. c/w current meds     9.  CrCl 57.3 mL/min.  encouraged continue oral water hydration. Stable.     Pte and family members displayed understanding of the above encounter and  treatment plan. All thoughtful questions were answered to their satisfaction. Pte was advised to notify the care team or proceed to the ER if signs and symptoms worsen.

## 2020-01-27 ENCOUNTER — OFFICE VISIT (OUTPATIENT)
Dept: HEMATOLOGY/ONCOLOGY | Facility: CLINIC | Age: 65
End: 2020-01-27
Payer: COMMERCIAL

## 2020-01-27 ENCOUNTER — LAB VISIT (OUTPATIENT)
Dept: LAB | Facility: HOSPITAL | Age: 65
End: 2020-01-27
Attending: INTERNAL MEDICINE
Payer: COMMERCIAL

## 2020-01-27 VITALS
HEIGHT: 71 IN | SYSTOLIC BLOOD PRESSURE: 124 MMHG | RESPIRATION RATE: 18 BRPM | DIASTOLIC BLOOD PRESSURE: 64 MMHG | HEART RATE: 101 BPM | TEMPERATURE: 99 F | BODY MASS INDEX: 34.54 KG/M2 | WEIGHT: 246.69 LBS | OXYGEN SATURATION: 97 %

## 2020-01-27 DIAGNOSIS — I10 ESSENTIAL HYPERTENSION: Chronic | ICD-10-CM

## 2020-01-27 DIAGNOSIS — C09.9 TONSILLAR CANCER: ICD-10-CM

## 2020-01-27 DIAGNOSIS — K12.30 MUCOSITIS: ICD-10-CM

## 2020-01-27 DIAGNOSIS — C77.0 SECONDARY MALIGNANT NEOPLASM OF CERVICAL LYMPH NODE: ICD-10-CM

## 2020-01-27 DIAGNOSIS — M10.272 ACUTE DRUG-INDUCED GOUT OF LEFT FOOT: Primary | ICD-10-CM

## 2020-01-27 DIAGNOSIS — E11.40 TYPE 2 DIABETES MELLITUS WITH DIABETIC NEUROPATHY, WITHOUT LONG-TERM CURRENT USE OF INSULIN: Chronic | ICD-10-CM

## 2020-01-27 DIAGNOSIS — N18.30 CKD (CHRONIC KIDNEY DISEASE) STAGE 3, GFR 30-59 ML/MIN: Chronic | ICD-10-CM

## 2020-01-27 LAB
ALBUMIN SERPL BCP-MCNC: 3.7 G/DL (ref 3.5–5.2)
ALP SERPL-CCNC: 99 U/L (ref 55–135)
ALT SERPL W/O P-5'-P-CCNC: 20 U/L (ref 10–44)
ANION GAP SERPL CALC-SCNC: 10 MMOL/L (ref 8–16)
AST SERPL-CCNC: 14 U/L (ref 10–40)
BASOPHILS # BLD AUTO: 0.03 K/UL (ref 0–0.2)
BASOPHILS NFR BLD: 0.5 % (ref 0–1.9)
BILIRUB SERPL-MCNC: 0.9 MG/DL (ref 0.1–1)
BUN SERPL-MCNC: 25 MG/DL (ref 8–23)
CALCIUM SERPL-MCNC: 9.6 MG/DL (ref 8.7–10.5)
CHLORIDE SERPL-SCNC: 104 MMOL/L (ref 95–110)
CO2 SERPL-SCNC: 27 MMOL/L (ref 23–29)
CREAT SERPL-MCNC: 1.7 MG/DL (ref 0.5–1.4)
DIFFERENTIAL METHOD: ABNORMAL
EOSINOPHIL # BLD AUTO: 0.1 K/UL (ref 0–0.5)
EOSINOPHIL NFR BLD: 1.9 % (ref 0–8)
ERYTHROCYTE [DISTWIDTH] IN BLOOD BY AUTOMATED COUNT: 14 % (ref 11.5–14.5)
EST. GFR  (AFRICAN AMERICAN): 48.9 ML/MIN/1.73 M^2
EST. GFR  (NON AFRICAN AMERICAN): 42.3 ML/MIN/1.73 M^2
GLUCOSE SERPL-MCNC: 228 MG/DL (ref 70–110)
HCT VFR BLD AUTO: 30.3 % (ref 40–54)
HGB BLD-MCNC: 9.6 G/DL (ref 14–18)
IMM GRANULOCYTES # BLD AUTO: 0.05 K/UL (ref 0–0.04)
IMM GRANULOCYTES NFR BLD AUTO: 0.9 % (ref 0–0.5)
LYMPHOCYTES # BLD AUTO: 0.6 K/UL (ref 1–4.8)
LYMPHOCYTES NFR BLD: 10.9 % (ref 18–48)
MCH RBC QN AUTO: 30.5 PG (ref 27–31)
MCHC RBC AUTO-ENTMCNC: 31.7 G/DL (ref 32–36)
MCV RBC AUTO: 96 FL (ref 82–98)
MONOCYTES # BLD AUTO: 0.4 K/UL (ref 0.3–1)
MONOCYTES NFR BLD: 7.5 % (ref 4–15)
NEUTROPHILS # BLD AUTO: 4.5 K/UL (ref 1.8–7.7)
NEUTROPHILS NFR BLD: 78.3 % (ref 38–73)
NRBC BLD-RTO: 0 /100 WBC
PLATELET # BLD AUTO: 227 K/UL (ref 150–350)
PMV BLD AUTO: 8.8 FL (ref 9.2–12.9)
POTASSIUM SERPL-SCNC: 4.4 MMOL/L (ref 3.5–5.1)
PROT SERPL-MCNC: 7.1 G/DL (ref 6–8.4)
RBC # BLD AUTO: 3.15 M/UL (ref 4.6–6.2)
SODIUM SERPL-SCNC: 141 MMOL/L (ref 136–145)
WBC # BLD AUTO: 5.7 K/UL (ref 3.9–12.7)

## 2020-01-27 PROCEDURE — 3044F PR MOST RECENT HEMOGLOBIN A1C LEVEL <7.0%: ICD-10-PCS | Mod: CPTII,S$GLB,, | Performed by: PHYSICIAN ASSISTANT

## 2020-01-27 PROCEDURE — 3044F HG A1C LEVEL LT 7.0%: CPT | Mod: CPTII,S$GLB,, | Performed by: PHYSICIAN ASSISTANT

## 2020-01-27 PROCEDURE — 3008F BODY MASS INDEX DOCD: CPT | Mod: CPTII,S$GLB,, | Performed by: PHYSICIAN ASSISTANT

## 2020-01-27 PROCEDURE — 80053 COMPREHEN METABOLIC PANEL: CPT

## 2020-01-27 PROCEDURE — 36415 COLL VENOUS BLD VENIPUNCTURE: CPT

## 2020-01-27 PROCEDURE — 99214 OFFICE O/P EST MOD 30 MIN: CPT | Mod: S$GLB,,, | Performed by: PHYSICIAN ASSISTANT

## 2020-01-27 PROCEDURE — 3074F PR MOST RECENT SYSTOLIC BLOOD PRESSURE < 130 MM HG: ICD-10-PCS | Mod: CPTII,S$GLB,, | Performed by: PHYSICIAN ASSISTANT

## 2020-01-27 PROCEDURE — 99999 PR PBB SHADOW E&M-EST. PATIENT-LVL V: ICD-10-PCS | Mod: PBBFAC,,, | Performed by: PHYSICIAN ASSISTANT

## 2020-01-27 PROCEDURE — 85025 COMPLETE CBC W/AUTO DIFF WBC: CPT

## 2020-01-27 PROCEDURE — 3078F PR MOST RECENT DIASTOLIC BLOOD PRESSURE < 80 MM HG: ICD-10-PCS | Mod: CPTII,S$GLB,, | Performed by: PHYSICIAN ASSISTANT

## 2020-01-27 PROCEDURE — 99214 PR OFFICE/OUTPT VISIT, EST, LEVL IV, 30-39 MIN: ICD-10-PCS | Mod: S$GLB,,, | Performed by: PHYSICIAN ASSISTANT

## 2020-01-27 PROCEDURE — 3078F DIAST BP <80 MM HG: CPT | Mod: CPTII,S$GLB,, | Performed by: PHYSICIAN ASSISTANT

## 2020-01-27 PROCEDURE — 99999 PR PBB SHADOW E&M-EST. PATIENT-LVL V: CPT | Mod: PBBFAC,,, | Performed by: PHYSICIAN ASSISTANT

## 2020-01-27 PROCEDURE — 3074F SYST BP LT 130 MM HG: CPT | Mod: CPTII,S$GLB,, | Performed by: PHYSICIAN ASSISTANT

## 2020-01-27 PROCEDURE — 3008F PR BODY MASS INDEX (BMI) DOCUMENTED: ICD-10-PCS | Mod: CPTII,S$GLB,, | Performed by: PHYSICIAN ASSISTANT

## 2020-01-31 LAB
ACID FAST MOD KINY STN SPEC: NORMAL
MYCOBACTERIUM SPEC QL CULT: NORMAL

## 2020-02-17 ENCOUNTER — OFFICE VISIT (OUTPATIENT)
Dept: HEMATOLOGY/ONCOLOGY | Facility: CLINIC | Age: 65
End: 2020-02-17
Payer: COMMERCIAL

## 2020-02-17 ENCOUNTER — LAB VISIT (OUTPATIENT)
Dept: LAB | Facility: OTHER | Age: 65
End: 2020-02-17
Attending: INTERNAL MEDICINE
Payer: COMMERCIAL

## 2020-02-17 ENCOUNTER — TELEPHONE (OUTPATIENT)
Dept: HEMATOLOGY/ONCOLOGY | Facility: CLINIC | Age: 65
End: 2020-02-17

## 2020-02-17 ENCOUNTER — INFUSION (OUTPATIENT)
Dept: INFUSION THERAPY | Facility: OTHER | Age: 65
End: 2020-02-17
Attending: PHYSICIAN ASSISTANT
Payer: COMMERCIAL

## 2020-02-17 VITALS
RESPIRATION RATE: 18 BRPM | BODY MASS INDEX: 34.57 KG/M2 | SYSTOLIC BLOOD PRESSURE: 118 MMHG | WEIGHT: 246.94 LBS | HEART RATE: 73 BPM | TEMPERATURE: 98 F | OXYGEN SATURATION: 99 % | DIASTOLIC BLOOD PRESSURE: 69 MMHG | HEIGHT: 71 IN

## 2020-02-17 DIAGNOSIS — M10.9 GOUT, UNSPECIFIED CAUSE, UNSPECIFIED CHRONICITY, UNSPECIFIED SITE: ICD-10-CM

## 2020-02-17 DIAGNOSIS — I10 ESSENTIAL HYPERTENSION: ICD-10-CM

## 2020-02-17 DIAGNOSIS — C09.9 TONSILLAR CANCER: Primary | ICD-10-CM

## 2020-02-17 DIAGNOSIS — K12.30 MUCOSITIS: ICD-10-CM

## 2020-02-17 DIAGNOSIS — T45.1X5A ANTINEOPLASTIC CHEMOTHERAPY INDUCED ANEMIA: ICD-10-CM

## 2020-02-17 DIAGNOSIS — D64.81 ANTINEOPLASTIC CHEMOTHERAPY INDUCED ANEMIA: ICD-10-CM

## 2020-02-17 DIAGNOSIS — C09.9 TONSILLAR CANCER: ICD-10-CM

## 2020-02-17 DIAGNOSIS — E11.40 TYPE 2 DIABETES MELLITUS WITH DIABETIC NEUROPATHY, WITHOUT LONG-TERM CURRENT USE OF INSULIN: ICD-10-CM

## 2020-02-17 DIAGNOSIS — N18.30 CKD (CHRONIC KIDNEY DISEASE) STAGE 3, GFR 30-59 ML/MIN: ICD-10-CM

## 2020-02-17 DIAGNOSIS — C77.0 SECONDARY MALIGNANT NEOPLASM OF CERVICAL LYMPH NODE: ICD-10-CM

## 2020-02-17 DIAGNOSIS — R22.1 NECK MASS: ICD-10-CM

## 2020-02-17 LAB
ALBUMIN SERPL BCP-MCNC: 3.7 G/DL (ref 3.5–5.2)
ALP SERPL-CCNC: 93 U/L (ref 55–135)
ALT SERPL W/O P-5'-P-CCNC: 13 U/L (ref 10–44)
ANION GAP SERPL CALC-SCNC: 8 MMOL/L (ref 8–16)
AST SERPL-CCNC: 12 U/L (ref 10–40)
BASOPHILS # BLD AUTO: 0.02 K/UL (ref 0–0.2)
BASOPHILS NFR BLD: 0.4 % (ref 0–1.9)
BILIRUB SERPL-MCNC: 1.1 MG/DL (ref 0.1–1)
BUN SERPL-MCNC: 23 MG/DL (ref 8–23)
CALCIUM SERPL-MCNC: 9.2 MG/DL (ref 8.7–10.5)
CHLORIDE SERPL-SCNC: 108 MMOL/L (ref 95–110)
CO2 SERPL-SCNC: 25 MMOL/L (ref 23–29)
CREAT SERPL-MCNC: 1.8 MG/DL (ref 0.5–1.4)
DIFFERENTIAL METHOD: ABNORMAL
EOSINOPHIL # BLD AUTO: 0.2 K/UL (ref 0–0.5)
EOSINOPHIL NFR BLD: 3.7 % (ref 0–8)
ERYTHROCYTE [DISTWIDTH] IN BLOOD BY AUTOMATED COUNT: 18.1 % (ref 11.5–14.5)
EST. GFR  (AFRICAN AMERICAN): 46 ML/MIN/1.73 M^2
EST. GFR  (NON AFRICAN AMERICAN): 39 ML/MIN/1.73 M^2
GLUCOSE SERPL-MCNC: 164 MG/DL (ref 70–110)
HCT VFR BLD AUTO: 30.2 % (ref 40–54)
HGB BLD-MCNC: 9.6 G/DL (ref 14–18)
IMM GRANULOCYTES # BLD AUTO: 0.03 K/UL (ref 0–0.04)
IMM GRANULOCYTES NFR BLD AUTO: 0.6 % (ref 0–0.5)
LYMPHOCYTES # BLD AUTO: 0.5 K/UL (ref 1–4.8)
LYMPHOCYTES NFR BLD: 10.5 % (ref 18–48)
MCH RBC QN AUTO: 31.8 PG (ref 27–31)
MCHC RBC AUTO-ENTMCNC: 31.8 G/DL (ref 32–36)
MCV RBC AUTO: 100 FL (ref 82–98)
MONOCYTES # BLD AUTO: 0.6 K/UL (ref 0.3–1)
MONOCYTES NFR BLD: 11.3 % (ref 4–15)
NEUTROPHILS # BLD AUTO: 3.6 K/UL (ref 1.8–7.7)
NEUTROPHILS NFR BLD: 73.5 % (ref 38–73)
NRBC BLD-RTO: 0 /100 WBC
PLATELET # BLD AUTO: 169 K/UL (ref 150–350)
PMV BLD AUTO: 8.2 FL (ref 9.2–12.9)
POTASSIUM SERPL-SCNC: 4.5 MMOL/L (ref 3.5–5.1)
PROT SERPL-MCNC: 6.7 G/DL (ref 6–8.4)
RBC # BLD AUTO: 3.02 M/UL (ref 4.6–6.2)
SODIUM SERPL-SCNC: 141 MMOL/L (ref 136–145)
T4 FREE SERPL-MCNC: 0.91 NG/DL (ref 0.71–1.51)
TSH SERPL DL<=0.005 MIU/L-ACNC: 6.23 UIU/ML (ref 0.4–4)
WBC # BLD AUTO: 4.86 K/UL (ref 3.9–12.7)

## 2020-02-17 PROCEDURE — 96413 CHEMO IV INFUSION 1 HR: CPT

## 2020-02-17 PROCEDURE — 99215 OFFICE O/P EST HI 40 MIN: CPT | Mod: S$GLB,,, | Performed by: INTERNAL MEDICINE

## 2020-02-17 PROCEDURE — 3044F PR MOST RECENT HEMOGLOBIN A1C LEVEL <7.0%: ICD-10-PCS | Mod: CPTII,S$GLB,, | Performed by: INTERNAL MEDICINE

## 2020-02-17 PROCEDURE — 3078F PR MOST RECENT DIASTOLIC BLOOD PRESSURE < 80 MM HG: ICD-10-PCS | Mod: CPTII,S$GLB,, | Performed by: INTERNAL MEDICINE

## 2020-02-17 PROCEDURE — 84443 ASSAY THYROID STIM HORMONE: CPT

## 2020-02-17 PROCEDURE — 99999 PR PBB SHADOW E&M-EST. PATIENT-LVL III: CPT | Mod: PBBFAC,,, | Performed by: INTERNAL MEDICINE

## 2020-02-17 PROCEDURE — 3074F PR MOST RECENT SYSTOLIC BLOOD PRESSURE < 130 MM HG: ICD-10-PCS | Mod: CPTII,S$GLB,, | Performed by: INTERNAL MEDICINE

## 2020-02-17 PROCEDURE — 85025 COMPLETE CBC W/AUTO DIFF WBC: CPT

## 2020-02-17 PROCEDURE — 36415 COLL VENOUS BLD VENIPUNCTURE: CPT

## 2020-02-17 PROCEDURE — 3008F PR BODY MASS INDEX (BMI) DOCUMENTED: ICD-10-PCS | Mod: CPTII,S$GLB,, | Performed by: INTERNAL MEDICINE

## 2020-02-17 PROCEDURE — 96416 CHEMO PROLONG INFUSE W/PUMP: CPT

## 2020-02-17 PROCEDURE — 3044F HG A1C LEVEL LT 7.0%: CPT | Mod: CPTII,S$GLB,, | Performed by: INTERNAL MEDICINE

## 2020-02-17 PROCEDURE — 99999 PR PBB SHADOW E&M-EST. PATIENT-LVL III: ICD-10-PCS | Mod: PBBFAC,,, | Performed by: INTERNAL MEDICINE

## 2020-02-17 PROCEDURE — 96367 TX/PROPH/DG ADDL SEQ IV INF: CPT

## 2020-02-17 PROCEDURE — 99215 PR OFFICE/OUTPT VISIT, EST, LEVL V, 40-54 MIN: ICD-10-PCS | Mod: S$GLB,,, | Performed by: INTERNAL MEDICINE

## 2020-02-17 PROCEDURE — 96417 CHEMO IV INFUS EACH ADDL SEQ: CPT

## 2020-02-17 PROCEDURE — 80053 COMPREHEN METABOLIC PANEL: CPT

## 2020-02-17 PROCEDURE — 63600175 PHARM REV CODE 636 W HCPCS: Mod: TB | Performed by: INTERNAL MEDICINE

## 2020-02-17 PROCEDURE — 3078F DIAST BP <80 MM HG: CPT | Mod: CPTII,S$GLB,, | Performed by: INTERNAL MEDICINE

## 2020-02-17 PROCEDURE — 3008F BODY MASS INDEX DOCD: CPT | Mod: CPTII,S$GLB,, | Performed by: INTERNAL MEDICINE

## 2020-02-17 PROCEDURE — 84439 ASSAY OF FREE THYROXINE: CPT

## 2020-02-17 PROCEDURE — 3074F SYST BP LT 130 MM HG: CPT | Mod: CPTII,S$GLB,, | Performed by: INTERNAL MEDICINE

## 2020-02-17 RX ORDER — SODIUM CHLORIDE 0.9 % (FLUSH) 0.9 %
10 SYRINGE (ML) INJECTION
Status: CANCELLED | OUTPATIENT
Start: 2020-02-18

## 2020-02-17 RX ORDER — CYANOCOBALAMIN 1000 UG/ML
1000 INJECTION, SOLUTION INTRAMUSCULAR; SUBCUTANEOUS
Status: DISCONTINUED | OUTPATIENT
Start: 2020-02-17 | End: 2020-02-17 | Stop reason: HOSPADM

## 2020-02-17 RX ORDER — HEPARIN 100 UNIT/ML
500 SYRINGE INTRAVENOUS
Status: CANCELLED | OUTPATIENT
Start: 2020-02-18

## 2020-02-17 RX ORDER — SODIUM CHLORIDE 0.9 % (FLUSH) 0.9 %
10 SYRINGE (ML) INJECTION
Status: CANCELLED | OUTPATIENT
Start: 2020-02-17

## 2020-02-17 RX ORDER — CYANOCOBALAMIN 1000 UG/ML
1000 INJECTION, SOLUTION INTRAMUSCULAR; SUBCUTANEOUS
Status: CANCELLED | OUTPATIENT
Start: 2020-02-17

## 2020-02-17 RX ORDER — HEPARIN 100 UNIT/ML
500 SYRINGE INTRAVENOUS
Status: CANCELLED | OUTPATIENT
Start: 2020-02-17

## 2020-02-17 RX ORDER — SODIUM CHLORIDE 0.9 % (FLUSH) 0.9 %
10 SYRINGE (ML) INJECTION
Status: DISCONTINUED | OUTPATIENT
Start: 2020-02-17 | End: 2020-02-17 | Stop reason: HOSPADM

## 2020-02-17 RX ORDER — HEPARIN 100 UNIT/ML
500 SYRINGE INTRAVENOUS
Status: DISCONTINUED | OUTPATIENT
Start: 2020-02-17 | End: 2020-02-17 | Stop reason: HOSPADM

## 2020-02-17 RX ADMIN — FLUOROURACIL 7585 MG: 50 INJECTION, SOLUTION INTRAVENOUS at 12:02

## 2020-02-17 RX ADMIN — APREPITANT 130 MG: 130 INJECTION, EMULSION INTRAVENOUS at 10:02

## 2020-02-17 RX ADMIN — SODIUM CHLORIDE 200 MG: 9 INJECTION, SOLUTION INTRAVENOUS at 10:02

## 2020-02-17 RX ADMIN — CARBOPLATIN 460 MG: 10 INJECTION, SOLUTION INTRAVENOUS at 11:02

## 2020-02-17 RX ADMIN — PALONOSETRON HYDROCHLORIDE: 0.25 INJECTION, SOLUTION INTRAVENOUS at 10:02

## 2020-02-17 RX ADMIN — SODIUM CHLORIDE: 0.9 INJECTION, SOLUTION INTRAVENOUS at 10:02

## 2020-02-17 NOTE — TELEPHONE ENCOUNTER
tried reaching out to pt on today in regards to 02/24 appointment @4:30, no answer,a detail message left on v/m to contact office.

## 2020-02-17 NOTE — Clinical Note
See Tamia on 2/24 with CBC, CMP, for toxicity check. Get CBC, CMP, PET scan on 3/6. RTC on 3/9, see me, then get carbo/5FU/keytruda, return on 3/13 to remove pump

## 2020-02-17 NOTE — PROGRESS NOTES
Subjective:       Patient ID: Jerrod Mendez is a 64 y.o. male.     Chief Complaint: follow up for R tonsil cancer     Diagnosis:  1. 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 Recurrent disease to the right supraclavicular LN found on 10/4/2019.      Oncologic History:  1. Mr Mendez is a 65 yo man with locally advanced R tonsillar cancer s/p chemoradiation. He was previously treated at [facility] [place]. He presented with a lump behind his right ear in Nov 2018. Laryngoscopy on 11/12/18 showed fullness in the right tonsil. CT neck on 11/13/2018 showed a 4.6 x 3.4 x 6.6 cm mass with areas of necrosis medial to the right parotid gland and extending inferior and posterior to the submandibular and posterior to the sternocleidomastoid compressing the jugular vein. Prominent adjacent posterior jugular nodes on the right 1.8 cm. Prominence of the soft tissue which showed asmymetric in the region of the right oropharynx without a discrete mass. Compression of the jugular vein and internal carotid artery by the mass. Prominent left posterior jugular node 1.4 cm. US guided FNA on 11/19/2018 showed squamous cell carcinoma, unable to test HPV. He was at the ER on 11/22/18 for trouble speaking. MRA brain/neck and MRI brain was unremarkable. It was felt that he had a TIA. PET scan on 12/10/18 showed Hypermetabolic right tonsillar mass with multiple metastatic right cervical lymph nodes, and single metastatic left cervical node.  No definite evidence of metastatic disease within the chest, abdomen or pelvis. Punctate nodular densities within the right upper lobe, questionable 4 mm cavitary nodule.    2. He was treated with concurrent chemoradiation with high dose cisplatin 100 mg/m2. He received two doses of cisplatin. Did not get dose 3 because of kidney failure. Completed radiation in Feb 2019.   3. Surveillance CT scan on 10/4/19 showed R supraclavicular lymphadenopathy which is new. That is when I saw him the  "first time in the clinic. We discussed that this is recurrent/stage IV tonsil cancer. Discussed with Dr Romano who feels the supraclavicular LAD is resectable. RT was deemed not an option by Dr Rodriguez. He was referred to Dr Romano. He underwent Modified neck dissection involving levels 2 through 5 with sacrifice of the spinal accessory nerve and ligation of the right internal jugular vein on 11/6/2019. Post-op note: "Superiorly (of the internal jugular vein), at the level of the skull base there was noted to be tissue in this region worrisome for viable tumor.  This was sampled and sent to pathology and confirmed to be consistent with squamous cell carcinoma.  Given the location of this tumor, affected breech the deep cervical fascia and the need to resect the carotid artery and vagus nerve in order to clear it, we had cleared his disease unresectable."  4. Pathology: 1) Tissue at right skull base, biopsy: Invasive squamous cell carcinoma, nonkeratinizing with basaloid features. 2) Right neck, level 5, dissection: Invasive squamous cell carcinoma, nonkeratinizing with basaloid features, involving fibroadipose tissues (5.0 cm metastatic deposit). One separate small lymph node negative for malignancy (0/1).  5. Patient had draining postop neck wound 2/2 seroma vs Chyle leak. Was seen by Dr Romano.   6. PET scan on 12/23/19 showed "hypermetabolic 16 x 9 mm lesion in the region of the right carotid space at the level of C2 with SUV max 6.07 on image 63.  There are also 3-4 hypermetabolic right level 4 lymph nodes measuring up to 1.5 cm in short axis on image 90 with an SUV of 9.1 for the conglomerate.  There are postsurgical changes in the surrounding right neck including surgical clips and a focal gas collection without associated fluid.  There is no discrete hypermetabolic activity at the skull base"  7. Carbo/5FU/keytruda cycle 1 on 12/30/2019. Patient was hospitalized 1/7/20-1/13/20 for severe mucositis causing " dysphagia and dehydration. Cycle 2 on 20 with carbo/keytruda only as patient needed to attend his daughter's wedding. Cycle 3 to be given on 2020.      Interval History:   Mr Mendez returns for follow up. Only got carbo/keytruda for cycle 2 as he needed to attend his daughter's wedding on . Did not have much side effects from carbo/keytruda. Had a very good time at his daughter's wedding last Friday. Ate a lot and gout is flaring up. Denies other complaints. Went back to work.      ECO     ROS:   A ten-point system review is obtained and negative except for what was stated in the Interval History.      Physical Examination:   Vital signs reviewed.   General: well hydrated, well developed, in no acute distress  HEENT: normocephalic, PERRLA, EOMI, anicteric sclerae, oropharynx clear, no mucositis  Neck: supple, no JVD, thyromegaly. No cervical or supraclavicular LAD. No open wounds.   Lungs: clear breath sounds bilaterally, no wheezing, rales, or rhonchi  Heart: RRR, no M/R/G  Abdomen: soft, no tenderness, non-distended, no hepatosplenomegaly, mass, or hernia. BS present  Extremities: no clubbing, cyanosis, or edema  Neuro: alert and oriented x 4, no focal neuro deficit  Psych: pleasant and appropriate mood and affect     Objective:      Laboratory Data:  Labs reviewed. Hb 9.6 stable. Creatinine 1.8 stable. Free T4 normal.         Assessment and Plan:      1. Tonsillar cancer    2. Secondary malignant neoplasm of cervical lymph node    3. Mucositis    4. Antineoplastic chemotherapy induced anemia    5. Type 2 diabetes mellitus with diabetic neuropathy, without long-term current use of insulin    6. Essential hypertension    7. Gout, unspecified cause, unspecified chronicity, unspecified site    8. CKD (chronic kidney disease) stage 3, GFR 30-59 ml/min        1.2  - Mr Mendez is a 65 yo man with originally stage IVB (TxN3) R tonsil cancer, diagnosed on 2018, s/p chemoradiation with high dose  cisplatin completed in Feb 2019. He developed recurrence in R supraclavicular LN in Oct 2019. Underwent a modified neck dissection involving levels 2 through 5 with sacrifice of the spinal accessory nerve and ligation of the right internal jugular vein on 11/6/2019 by Dr Romano. Intra-operatively, he was found to have unresectable tissue near the internal jugular vein at the level of the skull base, which was biopsy-proven to be squamous cell carcinoma.   - started palliative chemoimmunotherapy with carbo/5FU/keytruda on 12/30/19. Had severe mucositis from 5FU infusion resulting in a one-week hospitalization after cycle 1.   - tolerated cycle 2 well. Only got carbo/keytruda as he needed to go to his daughter's wedding  - cycle 3 today. Reduced 5FU  - return next Monday to see PA for toxicity check  - PET scan prior to cycle 4 in 3 weeks     3.  - no mucositis now as he did not get 5FU infusion with cycle 2  - has magic mouthwash and liquid morphine at home  - see PA for toxicity check next week    4.  - mild. Stable  - monitor    5.  - BS good  - c/w current meds    6.  - BP good  - c/w current meds    7.  - asked patient to call PCP to see what medication he can get for flare    8.  - Cr stable  - encouraged oral hydration    Their multiple questions were answered in the clinic. Encouraged to call should issues arise.   Toxicity check next week  Return in 3 weeks with PET scan

## 2020-02-17 NOTE — PLAN OF CARE
Keytruda/Carbo/5-FU administered, tolerated well. VSS, NAD. D/C'd home with 5-FU home infusion pump, due to finish on Friday 2/21/2020.

## 2020-02-18 ENCOUNTER — TELEPHONE (OUTPATIENT)
Dept: HEMATOLOGY/ONCOLOGY | Facility: CLINIC | Age: 65
End: 2020-02-18

## 2020-02-18 NOTE — PROGRESS NOTES
Subjective:       Patient ID: Jerrod Mendez is a 64 y.o. male.    Chief Complaint: Tonsillar cancer    History:  Past Medical History:   Diagnosis Date    Back pain     Brain aneurysm     Cancer     tonsils    Coronary artery disease     Diabetes mellitus     Gout     High cholesterol     Hypertension     Oropharyngeal dysphagia     JIM (obstructive sleep apnea)     Stroke     tia    TIA (transient ischemic attack)      Past Surgical History:   Procedure Laterality Date    CHOLECYSTECTOMY      CORONARY ANGIOPLASTY WITH STENT PLACEMENT      DISSECTION OF NECK Right 11/6/2019    Procedure: DISSECTION, NECK;  Surgeon: Stu Romano MD;  Location: [facility] OR [address] [place] [address];  Service: ENT;  Laterality: Right;    ESOPHAGOGASTRODUODENOSCOPY      FINE NEEDLE ASPIRATION Right     neck mass    GASTROSTOMY TUBE PLACEMENT      LIPOMA RESECTION      back    PEG TUBE REMOVAL N/A 5/28/2019    Procedure: REMOVAL, PEG TUBE;  Surgeon: Milton Friedman MD;  Location: [facility];  Service: Endoscopy;  Laterality: N/A;    PORTACATH PLACEMENT           Tonsillar cancer    12/5/2018 Initial Diagnosis     Tonsillar cancer      10/8/2019 -  Chemotherapy     Treatment Summary   Plan Name: OP HEAD AND NECK PEMBROLIZUMAB FLUOROURACIL CARBOPLATIN Q3W  Treatment Goal: Control  Status: Active  Start Date: 12/30/2019  End Date: 12/17/2021 (Planned)  Provider: Serena Blount MD  Chemotherapy: CARBOplatin (PARAPLATIN) 480 mg in sodium chloride 0.9% 250 mL chemo infusion, 480 mg (100 % of original dose 481.5 mg), Intravenous, Clinic/HOD 1 time, 3 of 6 cycles  Dose modification:   (original dose 481.5 mg, Cycle 1)  Administration: 480 mg (12/30/2019), 460 mg (2/17/2020), 405 mg (1/20/2020)  fluorouracil 1,000 mg/m2/day = 9,760 mg in sodium chloride 0.9% 240 mL chemo infusion, 1,000 mg/m2/day = 9,760 mg (100 % of original dose 1,000 mg/m2/day), Intravenous, Over 96 hours, 2 of 5 cycles  Dose modification: 1,000 mg/m2/day (original  dose 1,000 mg/m2/day, Cycle 1), 800 mg/m2/day (original dose 1,000 mg/m2/day, Cycle 3)  Administration: 9,760 mg (12/30/2019), 7,585 mg (2/17/2020)  pembrolizumab (KEYTRUDA) 200 mg in sodium chloride 0.9% 100 mL chemo infusion, 200 mg, Intravenous, Clinic/HOD 1 time, 3 of 35 cycles  Administration: 200 mg (12/30/2019), 200 mg (2/17/2020), 200 mg (1/20/2020)      11/6/2019 Surgery     Modified neck dissection involving levels 2 through 5 with sacrifice of the spinal accessory nerve and ligation of the right internal jugular vein          Allergies:  Review of patient's allergies indicates:  No Known Allergies     HPI Diagnosis:  1. 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 Recurrent disease to the right supraclavicular LN found on 10/4/2019.      Oncologic History:  1. Mr Mendez is a 63 yo man with locally advanced R tonsillar cancer s/p chemoradiation. [place] [facility] [place] He presented with a lump behind his right ear in Nov 2018. Laryngoscopy on 11/12/18 showed fullness in the right tonsil. CT neck on 11/13/2018 showed a 4.6 x 3.4 x 6.6 cm mass with areas of necrosis medial to the right parotid gland and extending inferior and posterior to the submandibular and posterior to the sternocleidomastoid compressing the jugular vein. Prominent adjacent posterior jugular nodes on the right 1.8 cm. Prominence of the soft tissue which showed asmymetric in the region of the right oropharynx without a discrete mass. Compression of the jugular vein and internal carotid artery by the mass. Prominent left posterior jugular node 1.4 cm. US guided FNA on 11/19/2018 showed squamous cell carcinoma, unable to test HPV. He was at the ER on 11/22/18 for trouble speaking. MRA brain/neck and MRI brain was unremarkable. It was felt that he had a TIA. PET scan on 12/10/18 showed Hypermetabolic right tonsillar mass with multiple metastatic right cervical lymph nodes, and single metastatic left  "cervical node.  No definite evidence of metastatic disease within the chest, abdomen or pelvis. Punctate nodular densities within the right upper lobe, questionable 4 mm cavitary nodule.    2. He was treated with concurrent chemoradiation with high dose cisplatin 100 mg/m2. He received two doses of cisplatin. Did not get dose 3 because of kidney failure. Completed radiation in Feb 2019.   3. Surveillance CT scan on 10/4/19 showed R supraclavicular lymphadenopathy which is new. That is when I saw him the first time in the clinic. We discussed that this is recurrent/stage IV tonsil cancer. Discussed with Dr Romano who feels the supraclavicular LAD is resectable. RT was deemed not an option by Dr Rodriguez. He was referred to Dr Romano. He underwent Modified neck dissection involving levels 2 through 5 with sacrifice of the spinal accessory nerve and ligation of the right internal jugular vein on 11/6/2019. Post-op note: "Superiorly (of the internal jugular vein), at the level of the skull base there was noted to be tissue in this region worrisome for viable tumor.  This was sampled and sent to pathology and confirmed to be consistent with squamous cell carcinoma.  Given the location of this tumor, affected breech the deep cervical fascia and the need to resect the carotid artery and vagus nerve in order to clear it, we had cleared his disease unresectable."  4. Pathology: 1) Tissue at right skull base, biopsy: Invasive squamous cell carcinoma, nonkeratinizing with basaloid features. 2) Right neck, level 5, dissection: Invasive squamous cell carcinoma, nonkeratinizing with basaloid features, involving fibroadipose tissues (5.0 cm metastatic deposit). One separate small lymph node negative for malignancy (0/1).  5. Patient had draining postop neck wound 2/2 seroma vs Chyle leak. Was seen by Dr Romano.   6. PET scan on 12/23/19 showed "hypermetabolic 16 x 9 mm lesion in the region of the right carotid space at the level of C2 " "with SUV max 6.07 on image 63.  There are also 3-4 hypermetabolic right level 4 lymph nodes measuring up to 1.5 cm in short axis on image 90 with an SUV of 9.1 for the conglomerate.  There are postsurgical changes in the surrounding right neck including surgical clips and a focal gas collection without associated fluid.  There is no discrete hypermetabolic activity at the skull base"  7. Carbo/5FU/keytruda cycle 1 on 12/30/2019. Patient was hospitalized 1/7/20-1/13/20 for severe mucositis causing dysphagia and dehydration. Cycle 2 on 1/20/20 with carbo/keytruda only as patient needed to attend his daughter's wedding. Cycle 3 to be given on 2/17/2020.      Interval History 2/24/2020: Mr Mendez returns for follow up and toxicity check. He is doing very well from a symptom standpoint with the Carboplatin/Keytruda. He continues to eat well and have a good appetite. Did not have much side effects from carbo/keytruda. Had a very good time at his daughter's wedding. Denies other complaints. Went back to work. He presents with his wife today. Pte denies fever, n/v, abdominal pain, chest pain, shortness of breath, dizziness, diarrhea, constipation, hematochezia, hematemesis, dysphagia, loss of appetite or unexplained weight loss. Mucositis has improved significantly with the use of the Magic mouthwash. He is not having worsening dysphagia or odynophagia. It is actually better than his previous visit. Overall, he is doing well. Will return on 3/6/2020 for PET/CT and next cycle of treatment. ECOG status is zero.     ECOG:  ECOG SCORE            Review of Systems   Constitutional: Negative for activity change, appetite change, chills, fatigue, fever and unexpected weight change.   HENT: Negative for congestion, mouth sores, nosebleeds, sinus pressure, sinus pain, sore throat and trouble swallowing.    Eyes: Negative for photophobia, pain and visual disturbance.   Respiratory: Negative for apnea, cough, chest tightness, shortness " of breath and wheezing.    Cardiovascular: Negative for chest pain and leg swelling.   Gastrointestinal: Negative for abdominal distention, abdominal pain, anal bleeding, blood in stool, constipation, diarrhea, nausea, rectal pain and vomiting.   Genitourinary: Negative for dysuria, flank pain and hematuria.   Musculoskeletal: Negative for back pain and gait problem.   Skin: Negative for color change and rash.   Neurological: Negative for dizziness, syncope, weakness, light-headedness, numbness and headaches.   Hematological: Negative for adenopathy.   Psychiatric/Behavioral: Negative for behavioral problems, confusion, sleep disturbance and suicidal ideas. The patient is not nervous/anxious.        Objective:      Physical Exam   Constitutional: He is oriented to person, place, and time. He appears well-developed and well-nourished. He is active and cooperative.  Non-toxic appearance. He does not have a sickly appearance. He does not appear ill. No distress.   HENT:   Head: Normocephalic and atraumatic.   Right Ear: Hearing normal. No tenderness.   Left Ear: Hearing normal. No tenderness.   Nose: No rhinorrhea, sinus tenderness or nasal deformity. No epistaxis.  No foreign bodies.   Mouth/Throat: Uvula is midline, oropharynx is clear and moist and mucous membranes are normal. Normal dentition. No dental abscesses. No oropharyngeal exudate or tonsillar abscesses.   Eyes: Pupils are equal, round, and reactive to light. Conjunctivae, EOM and lids are normal. No foreign body present in the right eye. No foreign body present in the left eye. No scleral icterus.   Neck: Trachea normal and normal range of motion. Neck supple. No muscular tenderness present. No tracheal deviation and normal range of motion present.   Cardiovascular: Normal rate, regular rhythm and normal heart sounds. Exam reveals no gallop and no friction rub.   No murmur heard.  Pulmonary/Chest: Effort normal and breath sounds normal. No accessory muscle  usage or stridor. No respiratory distress. He has no decreased breath sounds. He has no wheezes. He has no rhonchi. He has no rales. He exhibits no tenderness.   Abdominal: Soft. Bowel sounds are normal. He exhibits no distension, no ascites and no mass. There is no tenderness. There is no rigidity, no rebound and no guarding. No hernia.   Musculoskeletal: Normal range of motion. He exhibits no edema or tenderness.        Right shoulder: He exhibits normal range of motion, no tenderness, no swelling, no deformity, no pain and no spasm.   Lymphadenopathy:        Head (right side): No submental and no submandibular adenopathy present.        Head (left side): No submental and no submandibular adenopathy present.     He has no cervical adenopathy.   Neurological: He is alert and oriented to person, place, and time. He has normal strength. He displays normal reflexes. No cranial nerve deficit or sensory deficit. Coordination and gait normal.   Skin: Skin is warm, dry and intact. Capillary refill takes less than 2 seconds. No ecchymosis, no petechiae and no rash noted. He is not diaphoretic. No erythema.   Psychiatric: He has a normal mood and affect. His speech is normal and behavior is normal. Judgment and thought content normal.   Nursing note and vitals reviewed.      Results:   Previous lab work is stable.        Assessment:     1. Tonsillar cancer    2. Secondary malignant neoplasm of cervical lymph node    3. Mucositis    4. Antineoplastic chemotherapy induced anemia    5. Type 2 diabetes mellitus with diabetic neuropathy, without long-term current use of insulin    6. Essential hypertension    7. Gout, unspecified cause, unspecified chronicity, unspecified site    8. CKD (chronic kidney disease) stage 3, GFR 30-59 ml/min        Plan:   1,2. Mr Mendez is a 65 yo man with originally stage IVB (TxN3) R tonsil cancer, diagnosed on 11/19/2018, s/p chemoradiation with high dose cisplatin completed in Feb 2019. He  developed recurrence in R supraclavicular LN in Oct 2019. Underwent a modified neck dissection involving levels 2 through 5 with sacrifice of the spinal accessory nerve and ligation of the right internal jugular vein on 11/6/2019 by Dr Romano. Intra-operatively, he was found to have unresectable tissue near the internal jugular vein at the level of the skull base, which was biopsy-proven to be squamous cell carcinoma. started palliative chemoimmunotherapy with carbo/5FU/keytruda on 12/30/19. Had severe mucositis from 5FU infusion resulting in a one-week hospitalization after cycle 1. Did well with this last cycle with the reduced 5-FU. Tolerated cycle 2 and cycle 3 well.   - PET scan prior to cycle 4 on 3/6/2020. Will have f/u with Dr. Blount for cycle 4.   -continue f/u with Dr. Blount on 3/6/2020     3.  - no mucositis now as he did not get 5FU infusion with cycle 2  - has magic mouthwash and liquid morphine at home     4.  - mild. Stable  - monitor     5.  - BS has been good  - c/w current meds     6.  - BP good  - c/w current meds     7.  - asked patient to call PCP to see what medication he can get for flare     8.  - Cr stable  - encouraged oral hydration     Return in 3/6/2020 with PET scan    Pte and family members displayed understanding of the above encounter and treatment plan. All thoughtful questions were answered to their satisfaction. Pte was advised to notify the care team or proceed to the ER if signs and symptoms worsen.

## 2020-02-18 NOTE — TELEPHONE ENCOUNTER
spoke with pt spouse on today in regards to changing appointment on 02/24 from 4:30 to 12:00, pt is aware and has confirm.

## 2020-02-21 ENCOUNTER — INFUSION (OUTPATIENT)
Dept: INFUSION THERAPY | Facility: OTHER | Age: 65
End: 2020-02-21
Attending: PHYSICIAN ASSISTANT
Payer: COMMERCIAL

## 2020-02-21 VITALS
BODY MASS INDEX: 34.44 KG/M2 | RESPIRATION RATE: 17 BRPM | HEIGHT: 71 IN | OXYGEN SATURATION: 99 % | TEMPERATURE: 98 F | HEART RATE: 81 BPM | SYSTOLIC BLOOD PRESSURE: 139 MMHG | DIASTOLIC BLOOD PRESSURE: 80 MMHG

## 2020-02-21 DIAGNOSIS — R22.1 NECK MASS: ICD-10-CM

## 2020-02-21 DIAGNOSIS — C09.9 TONSILLAR CANCER: Primary | ICD-10-CM

## 2020-02-21 PROCEDURE — 25000003 PHARM REV CODE 250: Performed by: INTERNAL MEDICINE

## 2020-02-21 PROCEDURE — A4216 STERILE WATER/SALINE, 10 ML: HCPCS | Performed by: INTERNAL MEDICINE

## 2020-02-21 PROCEDURE — 63600175 PHARM REV CODE 636 W HCPCS: Performed by: INTERNAL MEDICINE

## 2020-02-21 PROCEDURE — 96521 REFILL/MAINT PORTABLE PUMP: CPT

## 2020-02-21 RX ORDER — HEPARIN 100 UNIT/ML
500 SYRINGE INTRAVENOUS
Status: DISCONTINUED | OUTPATIENT
Start: 2020-02-21 | End: 2020-02-21 | Stop reason: HOSPADM

## 2020-02-21 RX ORDER — SODIUM CHLORIDE 0.9 % (FLUSH) 0.9 %
10 SYRINGE (ML) INJECTION
Status: DISCONTINUED | OUTPATIENT
Start: 2020-02-21 | End: 2020-02-21 | Stop reason: HOSPADM

## 2020-02-21 RX ADMIN — HEPARIN 500 UNITS: 100 SYRINGE at 12:02

## 2020-02-21 RX ADMIN — Medication 10 ML: at 12:02

## 2020-02-21 NOTE — PLAN OF CARE
Chemotherapy pump discontinued no reaction. Patient tolerated well. Patient accompanied by spouse for discharge home. Port A Cath 20 gauge 3/4 inch needle deaccessed/ removed. No apparent distress noted. Discharge instructions given to patient. Patient understands instructions. Follow up appointment scheduled.

## 2020-02-24 ENCOUNTER — OFFICE VISIT (OUTPATIENT)
Dept: HEMATOLOGY/ONCOLOGY | Facility: CLINIC | Age: 65
End: 2020-02-24
Payer: COMMERCIAL

## 2020-02-24 VITALS
WEIGHT: 250.69 LBS | TEMPERATURE: 98 F | HEART RATE: 83 BPM | DIASTOLIC BLOOD PRESSURE: 67 MMHG | BODY MASS INDEX: 35.1 KG/M2 | HEIGHT: 71 IN | SYSTOLIC BLOOD PRESSURE: 131 MMHG | OXYGEN SATURATION: 100 % | RESPIRATION RATE: 18 BRPM

## 2020-02-24 DIAGNOSIS — C09.9 TONSILLAR CANCER: Primary | ICD-10-CM

## 2020-02-24 DIAGNOSIS — I10 ESSENTIAL HYPERTENSION: Chronic | ICD-10-CM

## 2020-02-24 DIAGNOSIS — M10.9 GOUT, UNSPECIFIED CAUSE, UNSPECIFIED CHRONICITY, UNSPECIFIED SITE: ICD-10-CM

## 2020-02-24 DIAGNOSIS — C77.0 SECONDARY MALIGNANT NEOPLASM OF CERVICAL LYMPH NODE: ICD-10-CM

## 2020-02-24 DIAGNOSIS — T45.1X5A ANTINEOPLASTIC CHEMOTHERAPY INDUCED ANEMIA: ICD-10-CM

## 2020-02-24 DIAGNOSIS — K12.30 MUCOSITIS: ICD-10-CM

## 2020-02-24 DIAGNOSIS — D64.81 ANTINEOPLASTIC CHEMOTHERAPY INDUCED ANEMIA: ICD-10-CM

## 2020-02-24 DIAGNOSIS — N18.30 CKD (CHRONIC KIDNEY DISEASE) STAGE 3, GFR 30-59 ML/MIN: Chronic | ICD-10-CM

## 2020-02-24 PROCEDURE — 3078F PR MOST RECENT DIASTOLIC BLOOD PRESSURE < 80 MM HG: ICD-10-PCS | Mod: CPTII,S$GLB,, | Performed by: PHYSICIAN ASSISTANT

## 2020-02-24 PROCEDURE — 99999 PR PBB SHADOW E&M-EST. PATIENT-LVL V: CPT | Mod: PBBFAC,,, | Performed by: PHYSICIAN ASSISTANT

## 2020-02-24 PROCEDURE — 3078F DIAST BP <80 MM HG: CPT | Mod: CPTII,S$GLB,, | Performed by: PHYSICIAN ASSISTANT

## 2020-02-24 PROCEDURE — 3008F PR BODY MASS INDEX (BMI) DOCUMENTED: ICD-10-PCS | Mod: CPTII,S$GLB,, | Performed by: PHYSICIAN ASSISTANT

## 2020-02-24 PROCEDURE — 99214 OFFICE O/P EST MOD 30 MIN: CPT | Mod: S$GLB,,, | Performed by: PHYSICIAN ASSISTANT

## 2020-02-24 PROCEDURE — 99214 PR OFFICE/OUTPT VISIT, EST, LEVL IV, 30-39 MIN: ICD-10-PCS | Mod: S$GLB,,, | Performed by: PHYSICIAN ASSISTANT

## 2020-02-24 PROCEDURE — 3008F BODY MASS INDEX DOCD: CPT | Mod: CPTII,S$GLB,, | Performed by: PHYSICIAN ASSISTANT

## 2020-02-24 PROCEDURE — 3075F SYST BP GE 130 - 139MM HG: CPT | Mod: CPTII,S$GLB,, | Performed by: PHYSICIAN ASSISTANT

## 2020-02-24 PROCEDURE — 3075F PR MOST RECENT SYSTOLIC BLOOD PRESS GE 130-139MM HG: ICD-10-PCS | Mod: CPTII,S$GLB,, | Performed by: PHYSICIAN ASSISTANT

## 2020-02-24 PROCEDURE — 99999 PR PBB SHADOW E&M-EST. PATIENT-LVL V: ICD-10-PCS | Mod: PBBFAC,,, | Performed by: PHYSICIAN ASSISTANT

## 2020-02-28 DIAGNOSIS — C09.9 TONSILLAR CANCER: Primary | ICD-10-CM

## 2020-02-28 DIAGNOSIS — C77.0 SECONDARY MALIGNANT NEOPLASM OF CERVICAL LYMPH NODE: ICD-10-CM

## 2020-03-06 ENCOUNTER — HOSPITAL ENCOUNTER (OUTPATIENT)
Dept: RADIOLOGY | Facility: HOSPITAL | Age: 65
Discharge: HOME | End: 2020-03-06
Attending: INTERNAL MEDICINE
Payer: COMMERCIAL

## 2020-03-06 DIAGNOSIS — C09.9 TONSILLAR CANCER: ICD-10-CM

## 2020-03-06 DIAGNOSIS — C77.0 SECONDARY MALIGNANT NEOPLASM OF CERVICAL LYMPH NODE: ICD-10-CM

## 2020-03-06 PROCEDURE — 70490 CT NECK CHEST WITHOUT CONTRAST (XPD): ICD-10-PCS | Mod: 26,,, | Performed by: RADIOLOGY

## 2020-03-06 PROCEDURE — 74176 CT ABD & PELVIS W/O CONTRAST: CPT | Mod: 26,,, | Performed by: RADIOLOGY

## 2020-03-06 PROCEDURE — 71250 CT THORAX DX C-: CPT | Mod: TC

## 2020-03-06 PROCEDURE — 74176 CT ABDOMEN PELVIS WITHOUT CONTRAST: ICD-10-PCS | Mod: 26,,, | Performed by: RADIOLOGY

## 2020-03-06 PROCEDURE — 70490 CT SOFT TISSUE NECK W/O DYE: CPT | Mod: 26,,, | Performed by: RADIOLOGY

## 2020-03-06 PROCEDURE — 71250 CT NECK CHEST WITHOUT CONTRAST (XPD): ICD-10-PCS | Mod: 26,,, | Performed by: RADIOLOGY

## 2020-03-06 PROCEDURE — 71250 CT THORAX DX C-: CPT | Mod: 26,,, | Performed by: RADIOLOGY

## 2020-03-06 PROCEDURE — 74176 CT ABD & PELVIS W/O CONTRAST: CPT | Mod: TC

## 2020-03-06 PROCEDURE — 70490 CT SOFT TISSUE NECK W/O DYE: CPT | Mod: TC

## 2020-03-06 PROCEDURE — 25500020 PHARM REV CODE 255: Performed by: INTERNAL MEDICINE

## 2020-03-06 RX ADMIN — IOHEXOL 15 ML: 350 INJECTION, SOLUTION INTRAVENOUS at 11:03

## 2020-03-06 NOTE — PROGRESS NOTES
Subjective:       Patient ID: Jerrod Mendez is a 64 y.o. male.     Chief Complaint: follow up for R tonsil cancer     Diagnosis:  1. 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 Recurrent disease to the right supraclavicular LN found on 10/4/2019.      Oncologic History:  1. Mr Mendez is a 63 yo man with locally advanced R tonsillar cancer s/p chemoradiation. [place] [facility] [place] with a lump behind his right ear in Nov 2018. Laryngoscopy on 11/12/18 showed fullness in the right tonsil. CT neck on 11/13/2018 showed a 4.6 x 3.4 x 6.6 cm mass with areas of necrosis medial to the right parotid gland and extending inferior and posterior to the submandibular and posterior to the sternocleidomastoid compressing the jugular vein. Prominent adjacent posterior jugular nodes on the right 1.8 cm. Prominence of the soft tissue which showed asmymetric in the region of the right oropharynx without a discrete mass. Compression of the jugular vein and internal carotid artery by the mass. Prominent left posterior jugular node 1.4 cm. US guided FNA on 11/19/2018 showed squamous cell carcinoma, unable to test HPV. He was at the ER on 11/22/18 for trouble speaking. MRA brain/neck and MRI brain was unremarkable. It was felt that he had a TIA. PET scan on 12/10/18 showed Hypermetabolic right tonsillar mass with multiple metastatic right cervical lymph nodes, and single metastatic left cervical node.  No definite evidence of metastatic disease within the chest, abdomen or pelvis. Punctate nodular densities within the right upper lobe, questionable 4 mm cavitary nodule.    2. He was treated with concurrent chemoradiation with high dose cisplatin 100 mg/m2. He received two doses of cisplatin. Did not get dose 3 because of kidney failure. Completed radiation in Feb 2019.   3. Surveillance CT scan on 10/4/19 showed R supraclavicular lymphadenopathy which is new. That is when I saw him the  "first time in the clinic. We discussed that this is recurrent/stage IV tonsil cancer. Discussed with Dr Romano who feels the supraclavicular LAD is resectable. RT was deemed not an option by Dr Rodriguez. He was referred to Dr Romano. He underwent Modified neck dissection involving levels 2 through 5 with sacrifice of the spinal accessory nerve and ligation of the right internal jugular vein on 11/6/2019. Post-op note: "Superiorly (of the internal jugular vein), at the level of the skull base there was noted to be tissue in this region worrisome for viable tumor.  This was sampled and sent to pathology and confirmed to be consistent with squamous cell carcinoma.  Given the location of this tumor, affected breech the deep cervical fascia and the need to resect the carotid artery and vagus nerve in order to clear it, we had cleared his disease unresectable."  4. Pathology: 1) Tissue at right skull base, biopsy: Invasive squamous cell carcinoma, nonkeratinizing with basaloid features. 2) Right neck, level 5, dissection: Invasive squamous cell carcinoma, nonkeratinizing with basaloid features, involving fibroadipose tissues (5.0 cm metastatic deposit). One separate small lymph node negative for malignancy (0/1).  5. Patient had draining postop neck wound 2/2 seroma vs Chyle leak. Was seen by Dr Romano.   6. PET scan on 12/23/19 showed "hypermetabolic 16 x 9 mm lesion in the region of the right carotid space at the level of C2 with SUV max 6.07 on image 63.  There are also 3-4 hypermetabolic right level 4 lymph nodes measuring up to 1.5 cm in short axis on image 90 with an SUV of 9.1 for the conglomerate.  There are postsurgical changes in the surrounding right neck including surgical clips and a focal gas collection without associated fluid.  There is no discrete hypermetabolic activity at the skull base"  7. Carbo/5FU/keytruda cycle 1 on 12/30/2019. Patient was hospitalized 1/7/20-1/13/20 for severe mucositis causing " dysphagia and dehydration. Cycle 2 on 20 with carbo/keytruda only as patient needed to attend his daughter's wedding. Cycle 3 on 2020. CT scan after 3 cycles showed stable disease.      Interval History:   Mr Mendez returns for follow up. Tolerated cycle 3 carbo/5FU/keytruda well. Did not have much mucositis. Mild sore throat. Had tantrum outbreak last week and kicked into something. Had bruising over right toe after that. Denies any pain.      ECO     ROS:   A ten-point system review is obtained and negative except for what was stated in the Interval History.      Physical Examination:   Vital signs reviewed.   General: well hydrated, well developed, in no acute distress  HEENT: normocephalic, PERRLA, EOMI, anicteric sclerae, oropharynx clear, no mucositis  Neck: supple, no JVD, thyromegaly. No cervical or supraclavicular LAD. No open wounds.   Lungs: clear breath sounds bilaterally, no wheezing, rales, or rhonchi  Heart: RRR, no M/R/G  Abdomen: soft, no tenderness, non-distended, no hepatosplenomegaly, mass, or hernia. BS present  Extremities: no clubbing, cyanosis, or edema. Right toe mild erythema, no bruising. No pain on passive and active movement. No neuro deficit.   Neuro: alert and oriented x 4, no focal neuro deficit  Psych: pleasant and appropriate mood and affect     Objective:      Laboratory Data:  Labs reviewed. Hb 10.5. Creatinine 1.8 stable. Free T4 normal.         Assessment and Plan:      1. Tonsillar cancer    2. Secondary malignant neoplasm of cervical lymph node    3. Injury of toe on right foot, initial encounter    4. Adjustment disorder, unspecified type    5. Essential hypertension    6. Type 2 diabetes mellitus with diabetic neuropathy, without long-term current use of insulin    7. CKD (chronic kidney disease) stage 3, GFR 30-59 ml/min         1.2  - Mr Mendez is a 63 yo man with originally stage IVB (TxN3) R tonsil cancer, diagnosed on 2018, s/p chemoradiation with high  dose cisplatin completed in Feb 2019. He developed recurrence in R supraclavicular LN in Oct 2019. Underwent a modified neck dissection involving levels 2 through 5 with sacrifice of the spinal accessory nerve and ligation of the right internal jugular vein on 11/6/2019 by Dr Romano. Intra-operatively, he was found to have unresectable tissue near the internal jugular vein at the level of the skull base, which was biopsy-proven to be squamous cell carcinoma.   - started palliative chemoimmunotherapy with carbo/5FU/keytruda on 12/30/19. Had severe mucositis from 5FU infusion resulting in a one-week hospitalization after cycle 1. Cycle 2 carbo/keytruda only as he needed to attend his daughter's wedding. Cycle 3 carbo/5fu/keytruda with 20% dose reduction in 5FU. Tolerated it very well  - doing well. Counts adequate.   - discussed CT scan result. Stable disease  - cycle 4 carbo/5FU/keytruda today  - return in 3 weeks for cycle 5  - restaging scan after 6 cycles. If no progression will get keytruda maintenance     3.  - will get xray to r/o fracture    4.  - denies SI/HI  - long discussion re referral to psychology. Patient finally agreed. Will refer to hem/onc/psy    5.  - BP good  - c/w current meds    6.  - BS good  - c/w current meds    7.  - Cr stable  - encouraged oral hydration

## 2020-03-09 ENCOUNTER — OFFICE VISIT (OUTPATIENT)
Dept: HEMATOLOGY/ONCOLOGY | Facility: CLINIC | Age: 65
End: 2020-03-09
Payer: COMMERCIAL

## 2020-03-09 ENCOUNTER — TELEPHONE (OUTPATIENT)
Dept: INFUSION THERAPY | Facility: HOSPITAL | Age: 65
End: 2020-03-09

## 2020-03-09 ENCOUNTER — INFUSION (OUTPATIENT)
Dept: INFUSION THERAPY | Facility: OTHER | Age: 65
End: 2020-03-09
Attending: INTERNAL MEDICINE
Payer: COMMERCIAL

## 2020-03-09 VITALS
RESPIRATION RATE: 18 BRPM | DIASTOLIC BLOOD PRESSURE: 78 MMHG | HEART RATE: 73 BPM | TEMPERATURE: 98 F | HEIGHT: 71 IN | OXYGEN SATURATION: 98 % | SYSTOLIC BLOOD PRESSURE: 122 MMHG | WEIGHT: 258.38 LBS | BODY MASS INDEX: 36.17 KG/M2

## 2020-03-09 DIAGNOSIS — R22.1 NECK MASS: ICD-10-CM

## 2020-03-09 DIAGNOSIS — C77.0 SECONDARY MALIGNANT NEOPLASM OF CERVICAL LYMPH NODE: ICD-10-CM

## 2020-03-09 DIAGNOSIS — N18.30 CKD (CHRONIC KIDNEY DISEASE) STAGE 3, GFR 30-59 ML/MIN: ICD-10-CM

## 2020-03-09 DIAGNOSIS — C09.9 TONSILLAR CANCER: Primary | ICD-10-CM

## 2020-03-09 DIAGNOSIS — F43.20 ADJUSTMENT DISORDER, UNSPECIFIED TYPE: ICD-10-CM

## 2020-03-09 DIAGNOSIS — S99.921A INJURY OF TOE ON RIGHT FOOT, INITIAL ENCOUNTER: ICD-10-CM

## 2020-03-09 DIAGNOSIS — E11.40 TYPE 2 DIABETES MELLITUS WITH DIABETIC NEUROPATHY, WITHOUT LONG-TERM CURRENT USE OF INSULIN: ICD-10-CM

## 2020-03-09 DIAGNOSIS — I10 ESSENTIAL HYPERTENSION: ICD-10-CM

## 2020-03-09 PROCEDURE — 99215 OFFICE O/P EST HI 40 MIN: CPT | Mod: S$GLB,,, | Performed by: INTERNAL MEDICINE

## 2020-03-09 PROCEDURE — 96367 TX/PROPH/DG ADDL SEQ IV INF: CPT

## 2020-03-09 PROCEDURE — 99999 PR PBB SHADOW E&M-EST. PATIENT-LVL IV: ICD-10-PCS | Mod: PBBFAC,,, | Performed by: INTERNAL MEDICINE

## 2020-03-09 PROCEDURE — 63600175 PHARM REV CODE 636 W HCPCS: Performed by: INTERNAL MEDICINE

## 2020-03-09 PROCEDURE — 99999 PR PBB SHADOW E&M-EST. PATIENT-LVL IV: CPT | Mod: PBBFAC,,, | Performed by: INTERNAL MEDICINE

## 2020-03-09 PROCEDURE — 96417 CHEMO IV INFUS EACH ADDL SEQ: CPT

## 2020-03-09 PROCEDURE — 99215 PR OFFICE/OUTPT VISIT, EST, LEVL V, 40-54 MIN: ICD-10-PCS | Mod: S$GLB,,, | Performed by: INTERNAL MEDICINE

## 2020-03-09 PROCEDURE — 96413 CHEMO IV INFUSION 1 HR: CPT

## 2020-03-09 PROCEDURE — 96416 CHEMO PROLONG INFUSE W/PUMP: CPT

## 2020-03-09 RX ORDER — HEPARIN 100 UNIT/ML
500 SYRINGE INTRAVENOUS
Status: DISCONTINUED | OUTPATIENT
Start: 2020-03-09 | End: 2020-03-09 | Stop reason: HOSPADM

## 2020-03-09 RX ORDER — SODIUM CHLORIDE 0.9 % (FLUSH) 0.9 %
10 SYRINGE (ML) INJECTION
Status: CANCELLED | OUTPATIENT
Start: 2020-03-10

## 2020-03-09 RX ORDER — SODIUM CHLORIDE 0.9 % (FLUSH) 0.9 %
10 SYRINGE (ML) INJECTION
Status: DISCONTINUED | OUTPATIENT
Start: 2020-03-09 | End: 2020-03-09 | Stop reason: HOSPADM

## 2020-03-09 RX ORDER — SODIUM CHLORIDE 0.9 % (FLUSH) 0.9 %
10 SYRINGE (ML) INJECTION
Status: CANCELLED | OUTPATIENT
Start: 2020-03-09

## 2020-03-09 RX ORDER — HEPARIN 100 UNIT/ML
500 SYRINGE INTRAVENOUS
Status: CANCELLED | OUTPATIENT
Start: 2020-03-09

## 2020-03-09 RX ORDER — HEPARIN 100 UNIT/ML
500 SYRINGE INTRAVENOUS
Status: CANCELLED | OUTPATIENT
Start: 2020-03-10

## 2020-03-09 RX ADMIN — SODIUM CHLORIDE: 0.9 INJECTION, SOLUTION INTRAVENOUS at 11:03

## 2020-03-09 RX ADMIN — APREPITANT 130 MG: 130 INJECTION, EMULSION INTRAVENOUS at 12:03

## 2020-03-09 RX ADMIN — FLUOROURACIL 7745 MG: 50 INJECTION, SOLUTION INTRAVENOUS at 01:03

## 2020-03-09 RX ADMIN — PALONOSETRON HYDROCHLORIDE: 0.25 INJECTION, SOLUTION INTRAVENOUS at 12:03

## 2020-03-09 RX ADMIN — SODIUM CHLORIDE 200 MG: 9 INJECTION, SOLUTION INTRAVENOUS at 11:03

## 2020-03-09 RX ADMIN — CARBOPLATIN 480 MG: 10 INJECTION, SOLUTION INTRAVENOUS at 01:03

## 2020-03-09 NOTE — PLAN OF CARE
Keytruda/Carbo/5-FU administered, tolerated well. VSS, NAD. D/C'd home with family, 5-FU home infusion pump in place and due to finish ~2pm Friday 3/13/20.

## 2020-03-10 ENCOUNTER — TELEPHONE (OUTPATIENT)
Dept: HEMATOLOGY/ONCOLOGY | Facility: CLINIC | Age: 65
End: 2020-03-10

## 2020-03-10 NOTE — TELEPHONE ENCOUNTER
Returned call and spoke with Ms Gray, Mr Mendez wife. Ms Gray states she contacted her insurance provider. She contacted [facility] to discuss the reason they denied the request for the PA for the PET scan Dr Blount request. The request was denied and the approval was given for a Ct Scan. Ms Gray states she was told the proper documentation was not submitted and this caused the request to be denied. I informed Ms Gray, the PA are processed though another department. I will forward the above information to both Dr Blount and our PA staff.

## 2020-03-10 NOTE — TELEPHONE ENCOUNTER
----- Message from Bennie Francis sent at 3/10/2020 12:47 PM CDT -----  Contact: Sharath (wife)  Name of Who is Calling: Sharath (wife)    What is the request in detail:Sharath (wife) is requesting a PA for the CT scan The pa should be sent the insurance [employer]...   Please contact to further discuss and advise      Can the clinic reply by [facility]: no     What Number to Call Back if not in [facility]:[phone]

## 2020-03-13 ENCOUNTER — INFUSION (OUTPATIENT)
Dept: INFUSION THERAPY | Facility: OTHER | Age: 65
End: 2020-03-13
Attending: INTERNAL MEDICINE
Payer: COMMERCIAL

## 2020-03-13 VITALS
OXYGEN SATURATION: 97 % | DIASTOLIC BLOOD PRESSURE: 66 MMHG | HEART RATE: 80 BPM | RESPIRATION RATE: 16 BRPM | SYSTOLIC BLOOD PRESSURE: 133 MMHG | TEMPERATURE: 98 F

## 2020-03-13 DIAGNOSIS — R22.1 NECK MASS: ICD-10-CM

## 2020-03-13 DIAGNOSIS — C09.9 TONSILLAR CANCER: Primary | ICD-10-CM

## 2020-03-13 PROCEDURE — 96521 REFILL/MAINT PORTABLE PUMP: CPT

## 2020-03-13 PROCEDURE — 63600175 PHARM REV CODE 636 W HCPCS: Performed by: INTERNAL MEDICINE

## 2020-03-13 PROCEDURE — 25000003 PHARM REV CODE 250: Performed by: INTERNAL MEDICINE

## 2020-03-13 PROCEDURE — A4216 STERILE WATER/SALINE, 10 ML: HCPCS | Performed by: INTERNAL MEDICINE

## 2020-03-13 RX ORDER — SODIUM CHLORIDE 0.9 % (FLUSH) 0.9 %
10 SYRINGE (ML) INJECTION
Status: DISCONTINUED | OUTPATIENT
Start: 2020-03-13 | End: 2020-03-13 | Stop reason: HOSPADM

## 2020-03-13 RX ORDER — HEPARIN 100 UNIT/ML
500 SYRINGE INTRAVENOUS
Status: DISCONTINUED | OUTPATIENT
Start: 2020-03-13 | End: 2020-03-13 | Stop reason: HOSPADM

## 2020-03-13 RX ADMIN — Medication 10 ML: at 01:03

## 2020-03-13 RX ADMIN — HEPARIN 500 UNITS: 100 SYRINGE at 01:03

## 2020-03-13 NOTE — PLAN OF CARE
Chemotherapy home infusion pump discontinued, no reaction. Patient tolerated well. Patient accompanied by spouse for discharge home. Port A Cath 20 gauge 3/4 inch needle deaccessed/ removed. No apparent distress noted. Discharge instructions given to patient. Patient understands instructions. Follow up appointment scheduled.

## 2020-03-25 ENCOUNTER — TELEPHONE (OUTPATIENT)
Dept: INFUSION THERAPY | Facility: HOSPITAL | Age: 65
End: 2020-03-25

## 2020-03-30 ENCOUNTER — OFFICE VISIT (OUTPATIENT)
Dept: HEMATOLOGY/ONCOLOGY | Facility: CLINIC | Age: 65
End: 2020-03-30
Attending: INTERNAL MEDICINE
Payer: COMMERCIAL

## 2020-03-30 ENCOUNTER — INFUSION (OUTPATIENT)
Dept: INFUSION THERAPY | Facility: OTHER | Age: 65
End: 2020-03-30
Attending: INTERNAL MEDICINE
Payer: COMMERCIAL

## 2020-03-30 VITALS
DIASTOLIC BLOOD PRESSURE: 73 MMHG | SYSTOLIC BLOOD PRESSURE: 134 MMHG | WEIGHT: 256.19 LBS | OXYGEN SATURATION: 100 % | TEMPERATURE: 98 F | RESPIRATION RATE: 18 BRPM | BODY MASS INDEX: 35.87 KG/M2 | HEART RATE: 94 BPM | HEIGHT: 71 IN

## 2020-03-30 DIAGNOSIS — K12.30 MUCOSITIS: ICD-10-CM

## 2020-03-30 DIAGNOSIS — C09.9 TONSILLAR CANCER: Primary | ICD-10-CM

## 2020-03-30 PROCEDURE — 96367 TX/PROPH/DG ADDL SEQ IV INF: CPT

## 2020-03-30 PROCEDURE — 96417 CHEMO IV INFUS EACH ADDL SEQ: CPT

## 2020-03-30 PROCEDURE — 99214 PR OFFICE/OUTPT VISIT, EST, LEVL IV, 30-39 MIN: ICD-10-PCS | Mod: S$GLB,,, | Performed by: INTERNAL MEDICINE

## 2020-03-30 PROCEDURE — 96413 CHEMO IV INFUSION 1 HR: CPT

## 2020-03-30 PROCEDURE — 63600175 PHARM REV CODE 636 W HCPCS: Performed by: INTERNAL MEDICINE

## 2020-03-30 PROCEDURE — 99214 OFFICE O/P EST MOD 30 MIN: CPT | Mod: S$GLB,,, | Performed by: INTERNAL MEDICINE

## 2020-03-30 PROCEDURE — 99999 PR PBB SHADOW E&M-EST. PATIENT-LVL IV: CPT | Mod: PBBFAC,,, | Performed by: INTERNAL MEDICINE

## 2020-03-30 PROCEDURE — 99999 PR PBB SHADOW E&M-EST. PATIENT-LVL IV: ICD-10-PCS | Mod: PBBFAC,,, | Performed by: INTERNAL MEDICINE

## 2020-03-30 PROCEDURE — 96416 CHEMO PROLONG INFUSE W/PUMP: CPT

## 2020-03-30 RX ORDER — HEPARIN 100 UNIT/ML
500 SYRINGE INTRAVENOUS
Status: DISCONTINUED | OUTPATIENT
Start: 2020-03-30 | End: 2020-03-30 | Stop reason: HOSPADM

## 2020-03-30 RX ORDER — SODIUM CHLORIDE 0.9 % (FLUSH) 0.9 %
10 SYRINGE (ML) INJECTION
Status: CANCELLED | OUTPATIENT
Start: 2020-03-31

## 2020-03-30 RX ORDER — SODIUM CHLORIDE 0.9 % (FLUSH) 0.9 %
10 SYRINGE (ML) INJECTION
Status: CANCELLED | OUTPATIENT
Start: 2020-03-30

## 2020-03-30 RX ORDER — SODIUM CHLORIDE 0.9 % (FLUSH) 0.9 %
10 SYRINGE (ML) INJECTION
Status: DISCONTINUED | OUTPATIENT
Start: 2020-03-30 | End: 2020-03-30 | Stop reason: HOSPADM

## 2020-03-30 RX ORDER — HEPARIN 100 UNIT/ML
500 SYRINGE INTRAVENOUS
Status: CANCELLED | OUTPATIENT
Start: 2020-03-31

## 2020-03-30 RX ORDER — HEPARIN 100 UNIT/ML
500 SYRINGE INTRAVENOUS
Status: CANCELLED | OUTPATIENT
Start: 2020-03-30

## 2020-03-30 RX ADMIN — FLUOROURACIL 7710 MG: 50 INJECTION, SOLUTION INTRAVENOUS at 02:03

## 2020-03-30 RX ADMIN — DEXAMETHASONE SODIUM PHOSPHATE 8 MG: 4 INJECTION INTRA-ARTICULAR; INTRALESIONAL; INTRAMUSCULAR; INTRAVENOUS; SOFT TISSUE at 02:03

## 2020-03-30 RX ADMIN — SODIUM CHLORIDE: 0.9 INJECTION, SOLUTION INTRAVENOUS at 01:03

## 2020-03-30 RX ADMIN — CARBOPLATIN 490 MG: 10 INJECTION, SOLUTION INTRAVENOUS at 02:03

## 2020-03-30 RX ADMIN — SODIUM CHLORIDE 200 MG: 9 INJECTION, SOLUTION INTRAVENOUS at 01:03

## 2020-03-30 NOTE — PROGRESS NOTES
Subjective:       Patient ID: Jerrod Mendez is a 65 y.o. male.    Chief Complaint: No chief complaint on file.    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  He has been on chemotherapy with carboplatin, 5 FU, and immunotherapy with pembrolizumab.  He has completed 4 cycles thus far.  Scans after 3 cycles showed stable disease.    Today he reports that he had 1 oral ulcer after his last cycle of therapy which was painful.  He used Magic mouthwash for that.  Appetite and bowel function has been good.  He denies any shortness of breath.  He has some chronic right shoulder pain which has been present since his neck surgery.         He developed a lump behind his right ear in Nov 2018. Laryngoscopy on 11/12/18 showed fullness in the right tonsil. CT neck on 11/13/2018 showed a 4.6 x 3.4 x 6.6 cm mass with areas of necrosis medial to the right parotid gland and extending inferior and posterior to the submandibular and posterior to the sternocleidomastoid compressing the jugular vein. Prominent adjacent posterior jugular nodes on the right 1.8 cm. Prominence of the soft tissue which showed asmymetric in the region of the right oropharynx without a discrete mass. Compression of the jugular vein and internal carotid artery by the mass. Prominent left posterior jugular node 1.4 cm. US guided FNA on 11/19/2018 showed squamous cell carcinoma, unable to test HPV. He was at the ER on 11/22/18 for trouble speaking. MRA brain/neck and MRI brain was unremarkable. It was felt that he had a TIA. PET scan on 12/10/18 showed Hypermetabolic right tonsillar mass with multiple metastatic right cervical lymph nodes, and single metastatic left cervical node.  No definite evidence of metastatic disease within the chest, abdomen or pelvis. Punctate nodular densities within the right upper lobe, questionable 4 mm cavitary nodule.    2. He was treated with concurrent chemoradiation with high dose cisplatin 100 mg/m2. He received  "two doses of cisplatin. Did not get dose 3 because of kidney failure. Completed radiation in Feb 2019.   3. Surveillance CT scan on 10/4/19 showed R supraclavicular lymphadenopathy which is new. That is when I saw him the first time in the clinic. We discussed that this is recurrent/stage IV tonsil cancer. Discussed with Dr Romano who feels the supraclavicular LAD is resectable. RT was deemed not an option by Dr Rodriguez. He was referred to Dr Romano. He underwent Modified neck dissection involving levels 2 through 5 with sacrifice of the spinal accessory nerve and ligation of the right internal jugular vein on 11/6/2019. Post-op note: "Superiorly (of the internal jugular vein), at the level of the skull base there was noted to be tissue in this region worrisome for viable tumor.  This was sampled and sent to pathology and confirmed to be consistent with squamous cell carcinoma.  Given the location of this tumor, affected breech the deep cervical fascia and the need to resect the carotid artery and vagus nerve in order to clear it, we had cleared his disease unresectable."  4. Pathology: 1) Tissue at right skull base, biopsy: Invasive squamous cell carcinoma, nonkeratinizing with basaloid features. 2) Right neck, level 5, dissection: Invasive squamous cell carcinoma, nonkeratinizing with basaloid features, involving fibroadipose tissues (5.0 cm metastatic deposit). One separate small lymph node negative for malignancy (0/1).  5. Patient had draining postop neck wound 2/2 seroma vs Chyle leak. Was seen by Dr Romano.   6. PET scan on 12/23/19 showed "hypermetabolic 16 x 9 mm lesion in the region of the right carotid space at the level of C2 with SUV max 6.07 on image 63.  There are also 3-4 hypermetabolic right level 4 lymph nodes measuring up to 1.5 cm in short axis on image 90 with an SUV of 9.1 for the conglomerate.  There are postsurgical changes in the surrounding right neck including surgical clips and a focal gas " "collection without associated fluid.  There is no discrete hypermetabolic activity at the skull base"  7. Carbo/5FU/keytruda cycle 1 on 12/30/2019. Patient was hospitalized 1/7/20-1/13/20 for severe mucositis causing dysphagia and dehydration.  Cycle 2 on 1/20/20 with carbo/keytruda only as patient needed to attend his daughter's wedding.   Cycle 3 on 2/17/2020.     CT scan performed on March 6, 2020 after 3 cycles showed stable disease.     Review of Systems   Constitutional: Negative.    HENT: Positive for mouth sores and trouble swallowing.    Respiratory: Negative.    Cardiovascular: Negative.    Musculoskeletal: Positive for arthralgias.        Chronic right shoulder pain   Neurological: Negative.    Psychiatric/Behavioral: Negative.        Objective:      Physical Exam   Constitutional: He is oriented to person, place, and time. He appears well-developed and well-nourished. No distress.   HENT:   Mouth/Throat: Oropharynx is clear and moist. No oropharyngeal exudate.   Eyes: Pupils are equal, round, and reactive to light. Conjunctivae are normal.   Cardiovascular: Normal rate and regular rhythm.   Pulmonary/Chest: Effort normal and breath sounds normal. He has no wheezes. He has no rales.   Abdominal: Soft. He exhibits no mass. There is no tenderness.   Lymphadenopathy:     He has no cervical adenopathy.     He has no axillary adenopathy.   Neurological: He is alert and oriented to person, place, and time.   Skin: Skin is warm. No rash noted.   Psychiatric: He has a normal mood and affect. His behavior is normal. Thought content normal.   Vitals reviewed.      Assessment:     CBC shows a white count 2860, ANC 1800, with a hemoglobin of 10.2 and platelet count [id number]  Metabolic profile shows creatinine 1.7 with bilirubin of 1.5 and AST 19, ALT 20, alkaline phosphatase 90.  1. Tonsillar cancer        Plan:       Continue current therapy and return in 3 weeks for cycle 6.      "

## 2020-03-30 NOTE — PLAN OF CARE
Keytruda/Carbo/5-FU administered, tolerated well. VSS, NAD. D/C'd home with self. D/C'd home with 5-FU home infusion pump - due to finish ~3pm Friday 4/3/20.

## 2020-04-03 ENCOUNTER — INFUSION (OUTPATIENT)
Dept: INFUSION THERAPY | Facility: OTHER | Age: 65
End: 2020-04-03
Attending: INTERNAL MEDICINE

## 2020-04-03 VITALS
RESPIRATION RATE: 18 BRPM | DIASTOLIC BLOOD PRESSURE: 83 MMHG | SYSTOLIC BLOOD PRESSURE: 153 MMHG | HEART RATE: 79 BPM | OXYGEN SATURATION: 99 % | TEMPERATURE: 98 F

## 2020-04-03 DIAGNOSIS — C09.9 TONSILLAR CANCER: Primary | ICD-10-CM

## 2020-04-03 PROCEDURE — 63600175 PHARM REV CODE 636 W HCPCS: Performed by: INTERNAL MEDICINE

## 2020-04-03 PROCEDURE — A4216 STERILE WATER/SALINE, 10 ML: HCPCS | Performed by: INTERNAL MEDICINE

## 2020-04-03 PROCEDURE — 96521 REFILL/MAINT PORTABLE PUMP: CPT

## 2020-04-03 PROCEDURE — 25000003 PHARM REV CODE 250: Performed by: INTERNAL MEDICINE

## 2020-04-03 RX ORDER — HEPARIN 100 UNIT/ML
500 SYRINGE INTRAVENOUS
Status: DISCONTINUED | OUTPATIENT
Start: 2020-04-03 | End: 2020-04-03 | Stop reason: HOSPADM

## 2020-04-03 RX ORDER — SODIUM CHLORIDE 0.9 % (FLUSH) 0.9 %
10 SYRINGE (ML) INJECTION
Status: DISCONTINUED | OUTPATIENT
Start: 2020-04-03 | End: 2020-04-03 | Stop reason: HOSPADM

## 2020-04-03 RX ADMIN — HEPARIN 500 UNITS: 100 SYRINGE at 02:04

## 2020-04-03 RX ADMIN — Medication 10 ML: at 02:04

## 2020-04-03 NOTE — PLAN OF CARE
Chemotherapy infusion pump discontinued. Patient tolerated well. Port A Cath 20 gauge 3/4 inch needle deaccessed/ removed. No apparent distress noted. Discharge instructions given to patient. Patient understands instructions. Follow up appointment scheduled.

## 2020-04-16 DIAGNOSIS — Z13.9 SCREENING FOR CONDITION: Primary | ICD-10-CM

## 2020-04-16 NOTE — PROGRESS NOTES
Phoned the patient.    Discussed the following with the patient:  In an effort to protect our immunocompromised patients from potential exposure to COVID-19, [facility] will now require all patients receiving an infusion, an injection, and/or radiation therapy to be tested for COVID-19 prior to their appointment.  All patients currently under treatment will be tested immediately and patients initiating new treatment cycles or with one-time appointments (injections, transfusions, etc.) must be tested within 72 hours of their appointment.    The patient's 24-hour COVID-19 test was ordered and scheduled.  Reviewed the appointment date, time, and location with the patient.    Advised the patient that he can expect the results to take approximately 24 hours and that someone will reach out to him regarding his results.  Advised the patient that his treatment appointment will be rescheduled if he tests positive for COVID-19.    Questions were answered to the patient's satisfaction, and the patient verbalized understanding of information and agreement with the plan.     The above was completed in accordance with instructions and guidelines set forth by [facility].

## 2020-04-17 ENCOUNTER — PATIENT MESSAGE (OUTPATIENT)
Dept: HEMATOLOGY/ONCOLOGY | Facility: CLINIC | Age: 65
End: 2020-04-17

## 2020-04-17 ENCOUNTER — TELEPHONE (OUTPATIENT)
Dept: HEMATOLOGY/ONCOLOGY | Facility: CLINIC | Age: 65
End: 2020-04-17

## 2020-04-17 NOTE — TELEPHONE ENCOUNTER
Call made to patient to discuss the need for updated insurance information. No answer at this time. VML and patient portal message sent informing patient of this and advised patient call [phone] to update this information.

## 2020-04-20 ENCOUNTER — TELEPHONE (OUTPATIENT)
Dept: INFUSION THERAPY | Facility: OTHER | Age: 65
End: 2020-04-20

## 2020-04-20 NOTE — TELEPHONE ENCOUNTER
----- Message from Arlene Victoria sent at 4/20/2020  8:51 AM CDT -----  Contact: ANAID VAZQUEZ [[id number]]    Type:  Patient Returning Call    Who Called: ANAID VAZQUEZ [[id number]]    Who Left Message for Patient: Laura    Does the patient know what this is regarding?: Y    Can the clinic reply in [facility]: No    Best Call Back Number: [phone]    Additional Information: N/A

## 2020-04-20 NOTE — TELEPHONE ENCOUNTER
Call returned to patient and wife, they have lost insurance coverage and have applied for Medicaid. They asked if it was possible to go to [facility]. Contact information for [facility] given to patient and wife, appts at [facility] canceled.

## 2020-08-18 ENCOUNTER — TELEPHONE (OUTPATIENT)
Dept: HEMATOLOGY/ONCOLOGY | Facility: CLINIC | Age: 65
End: 2020-08-18

## 2020-08-18 NOTE — TELEPHONE ENCOUNTER
"Office notes faxed over to Internal Medicine MD. Note added that patient sees Dr Wild at the [facility] location.   ----- Message from Avelina Pitt sent at 8/18/2020  8:03 AM CDT -----  Doctor's Assist    Name of caller:  [facility]   Provider name:  Jose Alejandro Lau MD   Contact Preference:  [phone] Fax [phone]  Current patient or new patient?: current   Does Patient feel the need to see the MD today? No   What is the nature of the call?    - requesting recent progress notes, etc . Send to Dr. Carin Cardenas (Internal Med)    Additional Notes:   "Thank you for all that you do for our patients'"            "

## 2020-11-26 ENCOUNTER — APPOINTMENT (EMERGENCY)
Dept: RADIOLOGY | Facility: HOSPITAL | Age: 65
DRG: 281 | End: 2020-11-26
Payer: COMMERCIAL

## 2020-11-26 ENCOUNTER — HOSPITAL ENCOUNTER (INPATIENT)
Facility: HOSPITAL | Age: 65
LOS: 4 days | Discharge: HOME | DRG: 281 | End: 2020-11-30
Attending: EMERGENCY MEDICINE | Admitting: FAMILY MEDICINE
Payer: COMMERCIAL

## 2020-11-26 DIAGNOSIS — I50.9 CHF (CONGESTIVE HEART FAILURE) (HCC): ICD-10-CM

## 2020-11-26 DIAGNOSIS — N17.9 AKI (ACUTE KIDNEY INJURY) (HCC): ICD-10-CM

## 2020-11-26 DIAGNOSIS — R06.00 DYSPNEA: ICD-10-CM

## 2020-11-26 DIAGNOSIS — I50.23 ACUTE ON CHRONIC SYSTOLIC CONGESTIVE HEART FAILURE (HCC): ICD-10-CM

## 2020-11-26 DIAGNOSIS — I48.20 CHRONIC ATRIAL FIBRILLATION (HCC): ICD-10-CM

## 2020-11-26 DIAGNOSIS — I48.91 RAPID ATRIAL FIBRILLATION (HCC): Primary | ICD-10-CM

## 2020-11-26 DIAGNOSIS — I10 ESSENTIAL HYPERTENSION: ICD-10-CM

## 2020-11-26 DIAGNOSIS — I48.0 PAROXYSMAL ATRIAL FIBRILLATION (HCC): ICD-10-CM

## 2020-11-26 PROBLEM — E66.01 CLASS 2 SEVERE OBESITY DUE TO EXCESS CALORIES WITH SERIOUS COMORBIDITY AND BODY MASS INDEX (BMI) OF 35.0 TO 35.9 IN ADULT (HCC): Status: ACTIVE | Noted: 2020-11-26

## 2020-11-26 PROBLEM — I21.A1 TYPE 2 MI (MYOCARDIAL INFARCTION) (HCC): Status: ACTIVE | Noted: 2020-11-26

## 2020-11-26 LAB
ABO GROUP BLD: NORMAL
ABO GROUP BLD: NORMAL
ALBUMIN SERPL BCP-MCNC: 3.8 G/DL (ref 3.5–5)
ALP SERPL-CCNC: 86 U/L (ref 46–116)
ALT SERPL W P-5'-P-CCNC: 80 U/L (ref 12–78)
ANION GAP SERPL CALCULATED.3IONS-SCNC: 11 MMOL/L (ref 4–13)
APTT PPP: 32 SECONDS (ref 23–37)
AST SERPL W P-5'-P-CCNC: 43 U/L (ref 5–45)
BASOPHILS # BLD AUTO: 0.06 THOUSANDS/ΜL (ref 0–0.1)
BASOPHILS NFR BLD AUTO: 1 % (ref 0–1)
BILIRUB SERPL-MCNC: 1.52 MG/DL (ref 0.2–1)
BLD GP AB SCN SERPL QL: NEGATIVE
BUN SERPL-MCNC: 33 MG/DL (ref 5–25)
CALCIUM SERPL-MCNC: 9.2 MG/DL (ref 8.3–10.1)
CHLORIDE SERPL-SCNC: 107 MMOL/L (ref 100–108)
CHOLEST SERPL-MCNC: 146 MG/DL (ref 50–200)
CO2 SERPL-SCNC: 27 MMOL/L (ref 21–32)
CREAT SERPL-MCNC: 1.61 MG/DL (ref 0.6–1.3)
EOSINOPHIL # BLD AUTO: 0.03 THOUSAND/ΜL (ref 0–0.61)
EOSINOPHIL NFR BLD AUTO: 0 % (ref 0–6)
ERYTHROCYTE [DISTWIDTH] IN BLOOD BY AUTOMATED COUNT: 13.9 % (ref 11.6–15.1)
FLUAV RNA RESP QL NAA+PROBE: NEGATIVE
FLUBV RNA RESP QL NAA+PROBE: NEGATIVE
GFR SERPL CREATININE-BSD FRML MDRD: 45 ML/MIN/1.73SQ M
GLUCOSE SERPL-MCNC: 132 MG/DL (ref 65–140)
HCT VFR BLD AUTO: 49.2 % (ref 36.5–49.3)
HDLC SERPL-MCNC: 28 MG/DL
HGB BLD-MCNC: 16.1 G/DL (ref 12–17)
IMM GRANULOCYTES # BLD AUTO: 0.07 THOUSAND/UL (ref 0–0.2)
IMM GRANULOCYTES NFR BLD AUTO: 1 % (ref 0–2)
INR PPP: 1.53 (ref 0.84–1.19)
LACTATE SERPL-SCNC: 1.9 MMOL/L (ref 0.5–2)
LDLC SERPL CALC-MCNC: 99 MG/DL (ref 0–100)
LYMPHOCYTES # BLD AUTO: 3.59 THOUSANDS/ΜL (ref 0.6–4.47)
LYMPHOCYTES NFR BLD AUTO: 28 % (ref 14–44)
MAGNESIUM SERPL-MCNC: 2.2 MG/DL (ref 1.6–2.6)
MCH RBC QN AUTO: 30 PG (ref 26.8–34.3)
MCHC RBC AUTO-ENTMCNC: 32.7 G/DL (ref 31.4–37.4)
MCV RBC AUTO: 92 FL (ref 82–98)
MONOCYTES # BLD AUTO: 1.36 THOUSAND/ΜL (ref 0.17–1.22)
MONOCYTES NFR BLD AUTO: 11 % (ref 4–12)
NEUTROPHILS # BLD AUTO: 7.55 THOUSANDS/ΜL (ref 1.85–7.62)
NEUTS SEG NFR BLD AUTO: 59 % (ref 43–75)
NRBC BLD AUTO-RTO: 0 /100 WBCS
NT-PROBNP SERPL-MCNC: ABNORMAL PG/ML
PLATELET # BLD AUTO: 216 THOUSANDS/UL (ref 149–390)
PMV BLD AUTO: 11.9 FL (ref 8.9–12.7)
POTASSIUM SERPL-SCNC: 3.8 MMOL/L (ref 3.5–5.3)
PROT SERPL-MCNC: 7.5 G/DL (ref 6.4–8.2)
PROTHROMBIN TIME: 18.1 SECONDS (ref 11.6–14.5)
RBC # BLD AUTO: 5.37 MILLION/UL (ref 3.88–5.62)
RH BLD: POSITIVE
RH BLD: POSITIVE
RSV RNA RESP QL NAA+PROBE: NEGATIVE
SARS-COV-2 RNA RESP QL NAA+PROBE: NEGATIVE
SODIUM SERPL-SCNC: 145 MMOL/L (ref 136–145)
SPECIMEN EXPIRATION DATE: NORMAL
TRIGL SERPL-MCNC: 97 MG/DL
TROPONIN I SERPL-MCNC: 0.1 NG/ML
TROPONIN I SERPL-MCNC: 0.11 NG/ML
TROPONIN I SERPL-MCNC: 0.11 NG/ML
TSH SERPL DL<=0.05 MIU/L-ACNC: 4.13 UIU/ML (ref 0.36–3.74)
WBC # BLD AUTO: 12.66 THOUSAND/UL (ref 4.31–10.16)

## 2020-11-26 PROCEDURE — 90682 RIV4 VACC RECOMBINANT DNA IM: CPT | Performed by: FAMILY MEDICINE

## 2020-11-26 PROCEDURE — 96375 TX/PRO/DX INJ NEW DRUG ADDON: CPT

## 2020-11-26 PROCEDURE — 83036 HEMOGLOBIN GLYCOSYLATED A1C: CPT | Performed by: FAMILY MEDICINE

## 2020-11-26 PROCEDURE — 96374 THER/PROPH/DIAG INJ IV PUSH: CPT

## 2020-11-26 PROCEDURE — 84439 ASSAY OF FREE THYROXINE: CPT | Performed by: FAMILY MEDICINE

## 2020-11-26 PROCEDURE — 84443 ASSAY THYROID STIM HORMONE: CPT | Performed by: FAMILY MEDICINE

## 2020-11-26 PROCEDURE — 36415 COLL VENOUS BLD VENIPUNCTURE: CPT | Performed by: FAMILY MEDICINE

## 2020-11-26 PROCEDURE — 80053 COMPREHEN METABOLIC PANEL: CPT | Performed by: EMERGENCY MEDICINE

## 2020-11-26 PROCEDURE — 83605 ASSAY OF LACTIC ACID: CPT | Performed by: EMERGENCY MEDICINE

## 2020-11-26 PROCEDURE — 93005 ELECTROCARDIOGRAM TRACING: CPT

## 2020-11-26 PROCEDURE — 83880 ASSAY OF NATRIURETIC PEPTIDE: CPT | Performed by: EMERGENCY MEDICINE

## 2020-11-26 PROCEDURE — 85730 THROMBOPLASTIN TIME PARTIAL: CPT | Performed by: EMERGENCY MEDICINE

## 2020-11-26 PROCEDURE — 99285 EMERGENCY DEPT VISIT HI MDM: CPT

## 2020-11-26 PROCEDURE — 0241U HB NFCT DS VIR RESP RNA 4 TRGT: CPT | Performed by: EMERGENCY MEDICINE

## 2020-11-26 PROCEDURE — 85610 PROTHROMBIN TIME: CPT | Performed by: EMERGENCY MEDICINE

## 2020-11-26 PROCEDURE — 83735 ASSAY OF MAGNESIUM: CPT | Performed by: FAMILY MEDICINE

## 2020-11-26 PROCEDURE — 90471 IMMUNIZATION ADMIN: CPT | Performed by: FAMILY MEDICINE

## 2020-11-26 PROCEDURE — 99223 1ST HOSP IP/OBS HIGH 75: CPT | Performed by: FAMILY MEDICINE

## 2020-11-26 PROCEDURE — 71045 X-RAY EXAM CHEST 1 VIEW: CPT

## 2020-11-26 PROCEDURE — 80061 LIPID PANEL: CPT | Performed by: FAMILY MEDICINE

## 2020-11-26 PROCEDURE — 84484 ASSAY OF TROPONIN QUANT: CPT | Performed by: EMERGENCY MEDICINE

## 2020-11-26 PROCEDURE — 96376 TX/PRO/DX INJ SAME DRUG ADON: CPT

## 2020-11-26 PROCEDURE — 86850 RBC ANTIBODY SCREEN: CPT | Performed by: EMERGENCY MEDICINE

## 2020-11-26 PROCEDURE — 85025 COMPLETE CBC W/AUTO DIFF WBC: CPT | Performed by: EMERGENCY MEDICINE

## 2020-11-26 PROCEDURE — 99291 CRITICAL CARE FIRST HOUR: CPT | Performed by: EMERGENCY MEDICINE

## 2020-11-26 PROCEDURE — 86901 BLOOD TYPING SEROLOGIC RH(D): CPT | Performed by: EMERGENCY MEDICINE

## 2020-11-26 PROCEDURE — 86900 BLOOD TYPING SEROLOGIC ABO: CPT | Performed by: EMERGENCY MEDICINE

## 2020-11-26 PROCEDURE — 84484 ASSAY OF TROPONIN QUANT: CPT | Performed by: FAMILY MEDICINE

## 2020-11-26 RX ORDER — ATORVASTATIN CALCIUM 40 MG/1
40 TABLET, FILM COATED ORAL DAILY
Status: DISCONTINUED | OUTPATIENT
Start: 2020-11-27 | End: 2020-11-30 | Stop reason: HOSPADM

## 2020-11-26 RX ORDER — METOPROLOL TARTRATE 5 MG/5ML
5 INJECTION INTRAVENOUS ONCE
Status: COMPLETED | OUTPATIENT
Start: 2020-11-26 | End: 2020-11-26

## 2020-11-26 RX ORDER — FUROSEMIDE 20 MG/1
20 TABLET ORAL
COMMUNITY
Start: 2020-10-27 | End: 2020-11-30 | Stop reason: HOSPADM

## 2020-11-26 RX ORDER — ASPIRIN 81 MG/1
81 TABLET, CHEWABLE ORAL DAILY
Status: DISCONTINUED | OUTPATIENT
Start: 2020-11-27 | End: 2020-11-30 | Stop reason: HOSPADM

## 2020-11-26 RX ORDER — ONDANSETRON 2 MG/ML
4 INJECTION INTRAMUSCULAR; INTRAVENOUS EVERY 6 HOURS PRN
Status: DISCONTINUED | OUTPATIENT
Start: 2020-11-26 | End: 2020-11-30 | Stop reason: HOSPADM

## 2020-11-26 RX ORDER — DOCUSATE SODIUM 100 MG/1
100 CAPSULE, LIQUID FILLED ORAL 2 TIMES DAILY
Status: DISCONTINUED | OUTPATIENT
Start: 2020-11-26 | End: 2020-11-30 | Stop reason: HOSPADM

## 2020-11-26 RX ORDER — NITROGLYCERIN 0.4 MG/1
0.4 TABLET SUBLINGUAL
Status: DISCONTINUED | OUTPATIENT
Start: 2020-11-26 | End: 2020-11-30 | Stop reason: HOSPADM

## 2020-11-26 RX ORDER — METOPROLOL SUCCINATE 100 MG/1
100 TABLET, EXTENDED RELEASE ORAL DAILY
Status: DISCONTINUED | OUTPATIENT
Start: 2020-11-26 | End: 2020-11-30 | Stop reason: HOSPADM

## 2020-11-26 RX ORDER — PHENOL 1.4 %
600 AEROSOL, SPRAY (ML) MUCOUS MEMBRANE DAILY
COMMUNITY

## 2020-11-26 RX ORDER — POTASSIUM CHLORIDE 20 MEQ/1
20 TABLET, EXTENDED RELEASE ORAL DAILY
Status: DISCONTINUED | OUTPATIENT
Start: 2020-11-27 | End: 2020-11-30 | Stop reason: HOSPADM

## 2020-11-26 RX ORDER — MAGNESIUM HYDROXIDE/ALUMINUM HYDROXICE/SIMETHICONE 120; 1200; 1200 MG/30ML; MG/30ML; MG/30ML
30 SUSPENSION ORAL EVERY 6 HOURS PRN
Status: DISCONTINUED | OUTPATIENT
Start: 2020-11-26 | End: 2020-11-30 | Stop reason: HOSPADM

## 2020-11-26 RX ORDER — FUROSEMIDE 40 MG/1
40 TABLET ORAL
Status: COMPLETED | OUTPATIENT
Start: 2020-11-26 | End: 2020-11-29

## 2020-11-26 RX ORDER — METOPROLOL TARTRATE 5 MG/5ML
2.5 INJECTION INTRAVENOUS EVERY 6 HOURS PRN
Status: DISCONTINUED | OUTPATIENT
Start: 2020-11-26 | End: 2020-11-30 | Stop reason: HOSPADM

## 2020-11-26 RX ORDER — METOPROLOL SUCCINATE 100 MG/1
100 TABLET, EXTENDED RELEASE ORAL DAILY
COMMUNITY

## 2020-11-26 RX ORDER — NITROGLYCERIN 0.4 MG/1
0.4 TABLET SUBLINGUAL ONCE
Status: COMPLETED | OUTPATIENT
Start: 2020-11-26 | End: 2020-11-26

## 2020-11-26 RX ORDER — ATORVASTATIN CALCIUM 40 MG/1
TABLET, FILM COATED ORAL
COMMUNITY
Start: 2020-07-27

## 2020-11-26 RX ORDER — ASPIRIN 81 MG/1
TABLET, CHEWABLE ORAL
COMMUNITY
Start: 2020-08-03

## 2020-11-26 RX ORDER — POLYETHYLENE GLYCOL 3350 17 G/17G
17 POWDER, FOR SOLUTION ORAL DAILY
Status: DISCONTINUED | OUTPATIENT
Start: 2020-11-27 | End: 2020-11-30 | Stop reason: HOSPADM

## 2020-11-26 RX ORDER — FUROSEMIDE 10 MG/ML
40 INJECTION INTRAMUSCULAR; INTRAVENOUS ONCE
Status: COMPLETED | OUTPATIENT
Start: 2020-11-26 | End: 2020-11-26

## 2020-11-26 RX ORDER — ACETAMINOPHEN 325 MG/1
650 TABLET ORAL EVERY 4 HOURS PRN
Status: DISCONTINUED | OUTPATIENT
Start: 2020-11-26 | End: 2020-11-30 | Stop reason: HOSPADM

## 2020-11-26 RX ADMIN — Medication 400 MG: at 19:14

## 2020-11-26 RX ADMIN — APIXABAN 5 MG: 5 TABLET, FILM COATED ORAL at 19:14

## 2020-11-26 RX ADMIN — DOCUSATE SODIUM 100 MG: 100 CAPSULE ORAL at 19:14

## 2020-11-26 RX ADMIN — FUROSEMIDE 40 MG: 40 TABLET ORAL at 19:15

## 2020-11-26 RX ADMIN — METOPROLOL SUCCINATE 100 MG: 100 TABLET, FILM COATED, EXTENDED RELEASE ORAL at 19:04

## 2020-11-26 RX ADMIN — NITROGLYCERIN 0.4 MG: 0.4 TABLET SUBLINGUAL at 15:52

## 2020-11-26 RX ADMIN — METOROPROLOL TARTRATE 5 MG: 5 INJECTION, SOLUTION INTRAVENOUS at 16:42

## 2020-11-26 RX ADMIN — METOROPROLOL TARTRATE 5 MG: 5 INJECTION, SOLUTION INTRAVENOUS at 15:52

## 2020-11-26 RX ADMIN — FUROSEMIDE 40 MG: 10 INJECTION, SOLUTION INTRAMUSCULAR; INTRAVENOUS at 16:19

## 2020-11-26 RX ADMIN — INFLUENZA A VIRUS A/HAWAII/70/2019 (H1N1) RECOMBINANT HEMAGGLUTININ ANTIGEN, INFLUENZA A VIRUS A/MINNESOTA/41/2019 (H3N2) RECOMBINANT HEMAGGLUTININ ANTIGEN, INFLUENZA B VIRUS B/WASHINGTON/02/2019 RECOMBINANT HEMAGGLUTININ ANTIGEN, AND INFLUENZA B VIRUS B/PHUKET/3073/2013 RECOMBINANT HEMAGGLUTININ ANTIGEN 0.5 ML: 45; 45; 45; 45 INJECTION INTRAMUSCULAR at 19:31

## 2020-11-27 ENCOUNTER — APPOINTMENT (INPATIENT)
Dept: RADIOLOGY | Facility: HOSPITAL | Age: 65
DRG: 281 | End: 2020-11-27
Payer: COMMERCIAL

## 2020-11-27 ENCOUNTER — APPOINTMENT (INPATIENT)
Dept: NON INVASIVE DIAGNOSTICS | Facility: HOSPITAL | Age: 65
DRG: 281 | End: 2020-11-27
Payer: COMMERCIAL

## 2020-11-27 LAB
ALBUMIN SERPL BCP-MCNC: 3.1 G/DL (ref 3.5–5)
ALP SERPL-CCNC: 69 U/L (ref 46–116)
ALT SERPL W P-5'-P-CCNC: 71 U/L (ref 12–78)
ANION GAP SERPL CALCULATED.3IONS-SCNC: 4 MMOL/L (ref 4–13)
AST SERPL W P-5'-P-CCNC: 37 U/L (ref 5–45)
BILIRUB SERPL-MCNC: 1.19 MG/DL (ref 0.2–1)
BUN SERPL-MCNC: 31 MG/DL (ref 5–25)
CALCIUM ALBUM COR SERPL-MCNC: 9.1 MG/DL (ref 8.3–10.1)
CALCIUM SERPL-MCNC: 8.4 MG/DL (ref 8.3–10.1)
CHLORIDE SERPL-SCNC: 106 MMOL/L (ref 100–108)
CO2 SERPL-SCNC: 33 MMOL/L (ref 21–32)
CREAT SERPL-MCNC: 1.58 MG/DL (ref 0.6–1.3)
EST. AVERAGE GLUCOSE BLD GHB EST-MCNC: 131 MG/DL
GFR SERPL CREATININE-BSD FRML MDRD: 46 ML/MIN/1.73SQ M
GLUCOSE SERPL-MCNC: 102 MG/DL (ref 65–140)
HBA1C MFR BLD: 6.2 %
POTASSIUM SERPL-SCNC: 3.6 MMOL/L (ref 3.5–5.3)
PROT SERPL-MCNC: 6.2 G/DL (ref 6.4–8.2)
SODIUM SERPL-SCNC: 143 MMOL/L (ref 136–145)
T4 FREE SERPL-MCNC: 1.32 NG/DL (ref 0.76–1.46)
TROPONIN I SERPL-MCNC: 0.05 NG/ML
TSH SERPL DL<=0.05 MIU/L-ACNC: 1.01 UIU/ML (ref 0.36–3.74)

## 2020-11-27 PROCEDURE — 73502 X-RAY EXAM HIP UNI 2-3 VIEWS: CPT

## 2020-11-27 PROCEDURE — 93306 TTE W/DOPPLER COMPLETE: CPT

## 2020-11-27 PROCEDURE — 99222 1ST HOSP IP/OBS MODERATE 55: CPT | Performed by: INTERNAL MEDICINE

## 2020-11-27 PROCEDURE — 93005 ELECTROCARDIOGRAM TRACING: CPT

## 2020-11-27 PROCEDURE — 99233 SBSQ HOSP IP/OBS HIGH 50: CPT | Performed by: FAMILY MEDICINE

## 2020-11-27 PROCEDURE — 84443 ASSAY THYROID STIM HORMONE: CPT | Performed by: FAMILY MEDICINE

## 2020-11-27 PROCEDURE — 84484 ASSAY OF TROPONIN QUANT: CPT | Performed by: FAMILY MEDICINE

## 2020-11-27 PROCEDURE — 80053 COMPREHEN METABOLIC PANEL: CPT | Performed by: FAMILY MEDICINE

## 2020-11-27 PROCEDURE — 93306 TTE W/DOPPLER COMPLETE: CPT | Performed by: INTERNAL MEDICINE

## 2020-11-27 RX ORDER — AMIODARONE HYDROCHLORIDE 200 MG/1
400 TABLET ORAL
Status: DISCONTINUED | OUTPATIENT
Start: 2020-11-27 | End: 2020-11-29

## 2020-11-27 RX ORDER — AMLODIPINE BESYLATE 5 MG/1
5 TABLET ORAL DAILY
Status: DISCONTINUED | OUTPATIENT
Start: 2020-11-27 | End: 2020-11-30 | Stop reason: HOSPADM

## 2020-11-27 RX ADMIN — METOPROLOL TARTRATE 2.5 MG: 5 INJECTION INTRAVENOUS at 05:14

## 2020-11-27 RX ADMIN — AMIODARONE HYDROCHLORIDE 400 MG: 200 TABLET ORAL at 13:24

## 2020-11-27 RX ADMIN — FUROSEMIDE 40 MG: 40 TABLET ORAL at 12:40

## 2020-11-27 RX ADMIN — Medication 400 MG: at 17:03

## 2020-11-27 RX ADMIN — FUROSEMIDE 40 MG: 40 TABLET ORAL at 05:11

## 2020-11-27 RX ADMIN — APIXABAN 5 MG: 5 TABLET, FILM COATED ORAL at 08:53

## 2020-11-27 RX ADMIN — DOCUSATE SODIUM 100 MG: 100 CAPSULE ORAL at 08:52

## 2020-11-27 RX ADMIN — FUROSEMIDE 40 MG: 40 TABLET ORAL at 17:04

## 2020-11-27 RX ADMIN — Medication 400 MG: at 08:53

## 2020-11-27 RX ADMIN — METOPROLOL SUCCINATE 100 MG: 100 TABLET, FILM COATED, EXTENDED RELEASE ORAL at 08:52

## 2020-11-27 RX ADMIN — AMIODARONE HYDROCHLORIDE 400 MG: 200 TABLET ORAL at 17:04

## 2020-11-27 RX ADMIN — ATORVASTATIN CALCIUM 40 MG: 40 TABLET, FILM COATED ORAL at 08:52

## 2020-11-27 RX ADMIN — APIXABAN 5 MG: 5 TABLET, FILM COATED ORAL at 17:04

## 2020-11-27 RX ADMIN — ASPIRIN 81 MG CHEWABLE TABLET 81 MG: 81 TABLET CHEWABLE at 08:53

## 2020-11-27 RX ADMIN — DOCUSATE SODIUM 100 MG: 100 CAPSULE ORAL at 17:03

## 2020-11-27 RX ADMIN — AMLODIPINE BESYLATE 5 MG: 5 TABLET ORAL at 08:53

## 2020-11-27 RX ADMIN — ONDANSETRON 4 MG: 2 INJECTION INTRAMUSCULAR; INTRAVENOUS at 05:33

## 2020-11-27 RX ADMIN — POTASSIUM CHLORIDE 20 MEQ: 1500 TABLET, EXTENDED RELEASE ORAL at 08:53

## 2020-11-28 PROBLEM — E87.3 METABOLIC ALKALOSIS: Status: ACTIVE | Noted: 2020-11-28

## 2020-11-28 LAB
ALBUMIN SERPL BCP-MCNC: 3.3 G/DL (ref 3.5–5)
ALP SERPL-CCNC: 76 U/L (ref 46–116)
ALT SERPL W P-5'-P-CCNC: 71 U/L (ref 12–78)
ANION GAP SERPL CALCULATED.3IONS-SCNC: 4 MMOL/L (ref 4–13)
AST SERPL W P-5'-P-CCNC: 31 U/L (ref 5–45)
BASOPHILS # BLD AUTO: 0.03 THOUSANDS/ΜL (ref 0–0.1)
BASOPHILS NFR BLD AUTO: 0 % (ref 0–1)
BILIRUB SERPL-MCNC: 1.16 MG/DL (ref 0.2–1)
BUN SERPL-MCNC: 28 MG/DL (ref 5–25)
CALCIUM ALBUM COR SERPL-MCNC: 9.1 MG/DL (ref 8.3–10.1)
CALCIUM SERPL-MCNC: 8.5 MG/DL (ref 8.3–10.1)
CHLORIDE SERPL-SCNC: 102 MMOL/L (ref 100–108)
CO2 SERPL-SCNC: 34 MMOL/L (ref 21–32)
CREAT SERPL-MCNC: 1.44 MG/DL (ref 0.6–1.3)
EOSINOPHIL # BLD AUTO: 0.03 THOUSAND/ΜL (ref 0–0.61)
EOSINOPHIL NFR BLD AUTO: 0 % (ref 0–6)
ERYTHROCYTE [DISTWIDTH] IN BLOOD BY AUTOMATED COUNT: 14 % (ref 11.6–15.1)
GFR SERPL CREATININE-BSD FRML MDRD: 51 ML/MIN/1.73SQ M
GLUCOSE SERPL-MCNC: 179 MG/DL (ref 65–140)
HCT VFR BLD AUTO: 44.4 % (ref 36.5–49.3)
HGB BLD-MCNC: 14.5 G/DL (ref 12–17)
IMM GRANULOCYTES # BLD AUTO: 0.03 THOUSAND/UL (ref 0–0.2)
IMM GRANULOCYTES NFR BLD AUTO: 0 % (ref 0–2)
LYMPHOCYTES # BLD AUTO: 2.05 THOUSANDS/ΜL (ref 0.6–4.47)
LYMPHOCYTES NFR BLD AUTO: 23 % (ref 14–44)
MAGNESIUM SERPL-MCNC: 2.4 MG/DL (ref 1.6–2.6)
MCH RBC QN AUTO: 30.2 PG (ref 26.8–34.3)
MCHC RBC AUTO-ENTMCNC: 32.7 G/DL (ref 31.4–37.4)
MCV RBC AUTO: 93 FL (ref 82–98)
MONOCYTES # BLD AUTO: 0.6 THOUSAND/ΜL (ref 0.17–1.22)
MONOCYTES NFR BLD AUTO: 7 % (ref 4–12)
NEUTROPHILS # BLD AUTO: 6.39 THOUSANDS/ΜL (ref 1.85–7.62)
NEUTS SEG NFR BLD AUTO: 70 % (ref 43–75)
NRBC BLD AUTO-RTO: 0 /100 WBCS
PLATELET # BLD AUTO: 180 THOUSANDS/UL (ref 149–390)
PMV BLD AUTO: 11.9 FL (ref 8.9–12.7)
POTASSIUM SERPL-SCNC: 3.6 MMOL/L (ref 3.5–5.3)
PROT SERPL-MCNC: 6.6 G/DL (ref 6.4–8.2)
RBC # BLD AUTO: 4.8 MILLION/UL (ref 3.88–5.62)
SODIUM SERPL-SCNC: 140 MMOL/L (ref 136–145)
WBC # BLD AUTO: 9.13 THOUSAND/UL (ref 4.31–10.16)

## 2020-11-28 PROCEDURE — 83735 ASSAY OF MAGNESIUM: CPT | Performed by: FAMILY MEDICINE

## 2020-11-28 PROCEDURE — 80053 COMPREHEN METABOLIC PANEL: CPT | Performed by: FAMILY MEDICINE

## 2020-11-28 PROCEDURE — 99232 SBSQ HOSP IP/OBS MODERATE 35: CPT | Performed by: FAMILY MEDICINE

## 2020-11-28 PROCEDURE — 85025 COMPLETE CBC W/AUTO DIFF WBC: CPT | Performed by: FAMILY MEDICINE

## 2020-11-28 PROCEDURE — 94760 N-INVAS EAR/PLS OXIMETRY 1: CPT

## 2020-11-28 RX ORDER — ALBUTEROL SULFATE 90 UG/1
2 AEROSOL, METERED RESPIRATORY (INHALATION) EVERY 4 HOURS PRN
Status: DISCONTINUED | OUTPATIENT
Start: 2020-11-28 | End: 2020-11-30 | Stop reason: HOSPADM

## 2020-11-28 RX ORDER — ACETAZOLAMIDE 250 MG/1
250 TABLET ORAL ONCE
Status: COMPLETED | OUTPATIENT
Start: 2020-11-28 | End: 2020-11-28

## 2020-11-28 RX ORDER — LIDOCAINE 50 MG/G
1 PATCH TOPICAL DAILY
Status: DISCONTINUED | OUTPATIENT
Start: 2020-11-28 | End: 2020-11-28

## 2020-11-28 RX ADMIN — APIXABAN 5 MG: 5 TABLET, FILM COATED ORAL at 17:17

## 2020-11-28 RX ADMIN — DOCUSATE SODIUM 100 MG: 100 CAPSULE ORAL at 17:17

## 2020-11-28 RX ADMIN — AMIODARONE HYDROCHLORIDE 400 MG: 200 TABLET ORAL at 08:27

## 2020-11-28 RX ADMIN — FUROSEMIDE 40 MG: 40 TABLET ORAL at 11:45

## 2020-11-28 RX ADMIN — ATORVASTATIN CALCIUM 40 MG: 40 TABLET, FILM COATED ORAL at 08:27

## 2020-11-28 RX ADMIN — METOPROLOL SUCCINATE 100 MG: 100 TABLET, FILM COATED, EXTENDED RELEASE ORAL at 08:28

## 2020-11-28 RX ADMIN — FUROSEMIDE 40 MG: 40 TABLET ORAL at 17:17

## 2020-11-28 RX ADMIN — FUROSEMIDE 40 MG: 40 TABLET ORAL at 05:31

## 2020-11-28 RX ADMIN — DOCUSATE SODIUM 100 MG: 100 CAPSULE ORAL at 08:27

## 2020-11-28 RX ADMIN — ALBUTEROL SULFATE 2 PUFF: 90 AEROSOL, METERED RESPIRATORY (INHALATION) at 08:28

## 2020-11-28 RX ADMIN — APIXABAN 5 MG: 5 TABLET, FILM COATED ORAL at 08:27

## 2020-11-28 RX ADMIN — ACETAZOLAMIDE 250 MG: 250 TABLET ORAL at 15:05

## 2020-11-28 RX ADMIN — AMIODARONE HYDROCHLORIDE 400 MG: 200 TABLET ORAL at 11:45

## 2020-11-28 RX ADMIN — AMIODARONE HYDROCHLORIDE 400 MG: 200 TABLET ORAL at 15:05

## 2020-11-28 RX ADMIN — Medication 400 MG: at 17:17

## 2020-11-28 RX ADMIN — AMLODIPINE BESYLATE 5 MG: 5 TABLET ORAL at 08:28

## 2020-11-28 RX ADMIN — ASPIRIN 81 MG CHEWABLE TABLET 81 MG: 81 TABLET CHEWABLE at 08:27

## 2020-11-28 RX ADMIN — POTASSIUM CHLORIDE 20 MEQ: 1500 TABLET, EXTENDED RELEASE ORAL at 08:27

## 2020-11-28 RX ADMIN — Medication 400 MG: at 08:28

## 2020-11-29 LAB
ALBUMIN SERPL BCP-MCNC: 3.3 G/DL (ref 3.5–5)
ALP SERPL-CCNC: 77 U/L (ref 46–116)
ALT SERPL W P-5'-P-CCNC: 60 U/L (ref 12–78)
ANION GAP SERPL CALCULATED.3IONS-SCNC: 4 MMOL/L (ref 4–13)
ANISOCYTOSIS BLD QL SMEAR: PRESENT
AST SERPL W P-5'-P-CCNC: 23 U/L (ref 5–45)
ATRIAL RATE: 106 BPM
ATRIAL RATE: 145 BPM
BASOPHILS # BLD MANUAL: 0.1 THOUSAND/UL (ref 0–0.1)
BASOPHILS NFR MAR MANUAL: 1 % (ref 0–1)
BILIRUB SERPL-MCNC: 0.85 MG/DL (ref 0.2–1)
BUN SERPL-MCNC: 27 MG/DL (ref 5–25)
CALCIUM ALBUM COR SERPL-MCNC: 9.5 MG/DL (ref 8.3–10.1)
CALCIUM SERPL-MCNC: 8.9 MG/DL (ref 8.3–10.1)
CHLORIDE SERPL-SCNC: 103 MMOL/L (ref 100–108)
CO2 SERPL-SCNC: 32 MMOL/L (ref 21–32)
CREAT SERPL-MCNC: 1.51 MG/DL (ref 0.6–1.3)
EOSINOPHIL # BLD MANUAL: 0 THOUSAND/UL (ref 0–0.4)
EOSINOPHIL NFR BLD MANUAL: 0 % (ref 0–6)
ERYTHROCYTE [DISTWIDTH] IN BLOOD BY AUTOMATED COUNT: 14.2 % (ref 11.6–15.1)
GFR SERPL CREATININE-BSD FRML MDRD: 48 ML/MIN/1.73SQ M
GLUCOSE SERPL-MCNC: 121 MG/DL (ref 65–140)
HCT VFR BLD AUTO: 47.2 % (ref 36.5–49.3)
HGB BLD-MCNC: 15.2 G/DL (ref 12–17)
LYMPHOCYTES # BLD AUTO: 2.7 THOUSAND/UL (ref 0.6–4.47)
LYMPHOCYTES # BLD AUTO: 26 % (ref 14–44)
MCH RBC QN AUTO: 30.3 PG (ref 26.8–34.3)
MCHC RBC AUTO-ENTMCNC: 32.2 G/DL (ref 31.4–37.4)
MCV RBC AUTO: 94 FL (ref 82–98)
MONOCYTES # BLD AUTO: 0.93 THOUSAND/UL (ref 0–1.22)
MONOCYTES NFR BLD: 9 % (ref 4–12)
NEUTROPHILS # BLD MANUAL: 6.64 THOUSAND/UL (ref 1.85–7.62)
NEUTS SEG NFR BLD AUTO: 64 % (ref 43–75)
NRBC BLD AUTO-RTO: 0 /100 WBCS
P AXIS: 24 DEGREES
PLATELET # BLD AUTO: 194 THOUSANDS/UL (ref 149–390)
PLATELET BLD QL SMEAR: ADEQUATE
PMV BLD AUTO: 11.4 FL (ref 8.9–12.7)
POIKILOCYTOSIS BLD QL SMEAR: PRESENT
POTASSIUM SERPL-SCNC: 3.8 MMOL/L (ref 3.5–5.3)
PR INTERVAL: 170 MS
PROT SERPL-MCNC: 6.9 G/DL (ref 6.4–8.2)
QRS AXIS: -44 DEGREES
QRS AXIS: -80 DEGREES
QRS AXIS: -89 DEGREES
QRSD INTERVAL: 98 MS
QT INTERVAL: 266 MS
QT INTERVAL: 370 MS
QT INTERVAL: 382 MS
QTC INTERVAL: 413 MS
QTC INTERVAL: 507 MS
QTC INTERVAL: 507 MS
RBC # BLD AUTO: 5.01 MILLION/UL (ref 3.88–5.62)
SODIUM SERPL-SCNC: 139 MMOL/L (ref 136–145)
T WAVE AXIS: 127 DEGREES
T WAVE AXIS: 152 DEGREES
T WAVE AXIS: 153 DEGREES
TOTAL CELLS COUNTED SPEC: 100
VENTRICULAR RATE: 106 BPM
VENTRICULAR RATE: 113 BPM
VENTRICULAR RATE: 145 BPM
WBC # BLD AUTO: 10.38 THOUSAND/UL (ref 4.31–10.16)

## 2020-11-29 PROCEDURE — 85007 BL SMEAR W/DIFF WBC COUNT: CPT | Performed by: FAMILY MEDICINE

## 2020-11-29 PROCEDURE — 99232 SBSQ HOSP IP/OBS MODERATE 35: CPT | Performed by: FAMILY MEDICINE

## 2020-11-29 PROCEDURE — 93010 ELECTROCARDIOGRAM REPORT: CPT | Performed by: INTERNAL MEDICINE

## 2020-11-29 PROCEDURE — 80053 COMPREHEN METABOLIC PANEL: CPT | Performed by: FAMILY MEDICINE

## 2020-11-29 PROCEDURE — 85027 COMPLETE CBC AUTOMATED: CPT | Performed by: FAMILY MEDICINE

## 2020-11-29 RX ORDER — AMIODARONE HYDROCHLORIDE 200 MG/1
200 TABLET ORAL
Status: DISCONTINUED | OUTPATIENT
Start: 2020-11-29 | End: 2020-11-30 | Stop reason: HOSPADM

## 2020-11-29 RX ADMIN — AMLODIPINE BESYLATE 5 MG: 5 TABLET ORAL at 08:33

## 2020-11-29 RX ADMIN — POTASSIUM CHLORIDE 20 MEQ: 1500 TABLET, EXTENDED RELEASE ORAL at 08:33

## 2020-11-29 RX ADMIN — ATORVASTATIN CALCIUM 40 MG: 40 TABLET, FILM COATED ORAL at 08:33

## 2020-11-29 RX ADMIN — AMIODARONE HYDROCHLORIDE 400 MG: 200 TABLET ORAL at 08:35

## 2020-11-29 RX ADMIN — APIXABAN 5 MG: 5 TABLET, FILM COATED ORAL at 08:33

## 2020-11-29 RX ADMIN — DOCUSATE SODIUM 100 MG: 100 CAPSULE ORAL at 17:26

## 2020-11-29 RX ADMIN — ASPIRIN 81 MG CHEWABLE TABLET 81 MG: 81 TABLET CHEWABLE at 08:33

## 2020-11-29 RX ADMIN — FUROSEMIDE 40 MG: 40 TABLET ORAL at 06:26

## 2020-11-29 RX ADMIN — APIXABAN 5 MG: 5 TABLET, FILM COATED ORAL at 17:26

## 2020-11-29 RX ADMIN — Medication 400 MG: at 08:33

## 2020-11-29 RX ADMIN — AMIODARONE HYDROCHLORIDE 400 MG: 200 TABLET ORAL at 11:42

## 2020-11-29 RX ADMIN — FUROSEMIDE 40 MG: 40 TABLET ORAL at 11:42

## 2020-11-29 RX ADMIN — Medication 400 MG: at 17:26

## 2020-11-29 RX ADMIN — FUROSEMIDE 40 MG: 40 TABLET ORAL at 17:26

## 2020-11-29 RX ADMIN — AMIODARONE HYDROCHLORIDE 200 MG: 200 TABLET ORAL at 17:26

## 2020-11-29 RX ADMIN — METOPROLOL SUCCINATE 100 MG: 100 TABLET, FILM COATED, EXTENDED RELEASE ORAL at 08:33

## 2020-11-30 VITALS
SYSTOLIC BLOOD PRESSURE: 158 MMHG | OXYGEN SATURATION: 90 % | TEMPERATURE: 97.6 F | HEART RATE: 70 BPM | RESPIRATION RATE: 20 BRPM | HEIGHT: 70 IN | DIASTOLIC BLOOD PRESSURE: 99 MMHG | WEIGHT: 236.8 LBS | BODY MASS INDEX: 33.9 KG/M2

## 2020-11-30 LAB
ALBUMIN SERPL BCP-MCNC: 3.6 G/DL (ref 3.5–5)
ALP SERPL-CCNC: 81 U/L (ref 46–116)
ALT SERPL W P-5'-P-CCNC: 62 U/L (ref 12–78)
ANION GAP SERPL CALCULATED.3IONS-SCNC: 6 MMOL/L (ref 4–13)
AST SERPL W P-5'-P-CCNC: 38 U/L (ref 5–45)
BASOPHILS # BLD AUTO: 0.03 THOUSANDS/ΜL (ref 0–0.1)
BASOPHILS NFR BLD AUTO: 0 % (ref 0–1)
BILIRUB SERPL-MCNC: 1.03 MG/DL (ref 0.2–1)
BUN SERPL-MCNC: 24 MG/DL (ref 5–25)
CALCIUM SERPL-MCNC: 9.3 MG/DL (ref 8.3–10.1)
CHLORIDE SERPL-SCNC: 100 MMOL/L (ref 100–108)
CO2 SERPL-SCNC: 32 MMOL/L (ref 21–32)
CREAT SERPL-MCNC: 1.53 MG/DL (ref 0.6–1.3)
EOSINOPHIL # BLD AUTO: 0.07 THOUSAND/ΜL (ref 0–0.61)
EOSINOPHIL NFR BLD AUTO: 1 % (ref 0–6)
ERYTHROCYTE [DISTWIDTH] IN BLOOD BY AUTOMATED COUNT: 13.9 % (ref 11.6–15.1)
GFR SERPL CREATININE-BSD FRML MDRD: 48 ML/MIN/1.73SQ M
GLUCOSE SERPL-MCNC: 93 MG/DL (ref 65–140)
HCT VFR BLD AUTO: 47.5 % (ref 36.5–49.3)
HGB BLD-MCNC: 15.3 G/DL (ref 12–17)
IMM GRANULOCYTES # BLD AUTO: 0.03 THOUSAND/UL (ref 0–0.2)
IMM GRANULOCYTES NFR BLD AUTO: 0 % (ref 0–2)
LYMPHOCYTES # BLD AUTO: 1.77 THOUSANDS/ΜL (ref 0.6–4.47)
LYMPHOCYTES NFR BLD AUTO: 21 % (ref 14–44)
MCH RBC QN AUTO: 29.9 PG (ref 26.8–34.3)
MCHC RBC AUTO-ENTMCNC: 32.2 G/DL (ref 31.4–37.4)
MCV RBC AUTO: 93 FL (ref 82–98)
MONOCYTES # BLD AUTO: 0.79 THOUSAND/ΜL (ref 0.17–1.22)
MONOCYTES NFR BLD AUTO: 9 % (ref 4–12)
NEUTROPHILS # BLD AUTO: 5.89 THOUSANDS/ΜL (ref 1.85–7.62)
NEUTS SEG NFR BLD AUTO: 69 % (ref 43–75)
NRBC BLD AUTO-RTO: 0 /100 WBCS
PLATELET # BLD AUTO: 190 THOUSANDS/UL (ref 149–390)
PMV BLD AUTO: 11.8 FL (ref 8.9–12.7)
POTASSIUM SERPL-SCNC: 4.7 MMOL/L (ref 3.5–5.3)
PROT SERPL-MCNC: 7.6 G/DL (ref 6.4–8.2)
RBC # BLD AUTO: 5.11 MILLION/UL (ref 3.88–5.62)
SODIUM SERPL-SCNC: 138 MMOL/L (ref 136–145)
WBC # BLD AUTO: 8.58 THOUSAND/UL (ref 4.31–10.16)

## 2020-11-30 PROCEDURE — 80053 COMPREHEN METABOLIC PANEL: CPT | Performed by: FAMILY MEDICINE

## 2020-11-30 PROCEDURE — 93005 ELECTROCARDIOGRAM TRACING: CPT

## 2020-11-30 PROCEDURE — 99239 HOSP IP/OBS DSCHRG MGMT >30: CPT | Performed by: FAMILY MEDICINE

## 2020-11-30 PROCEDURE — 94761 N-INVAS EAR/PLS OXIMETRY MLT: CPT

## 2020-11-30 PROCEDURE — 85025 COMPLETE CBC W/AUTO DIFF WBC: CPT | Performed by: FAMILY MEDICINE

## 2020-11-30 RX ORDER — ISOSORBIDE MONONITRATE 30 MG/1
30 TABLET, EXTENDED RELEASE ORAL DAILY
Status: DISCONTINUED | OUTPATIENT
Start: 2020-11-30 | End: 2020-11-30

## 2020-11-30 RX ORDER — HYDRALAZINE HYDROCHLORIDE 25 MG/1
25 TABLET, FILM COATED ORAL EVERY 8 HOURS SCHEDULED
Status: DISCONTINUED | OUTPATIENT
Start: 2020-11-30 | End: 2020-11-30

## 2020-11-30 RX ORDER — AMIODARONE HYDROCHLORIDE 200 MG/1
TABLET ORAL
Qty: 90 TABLET | Refills: 0 | Status: SHIPPED | OUTPATIENT
Start: 2020-11-30

## 2020-11-30 RX ORDER — TORSEMIDE 20 MG/1
60 TABLET ORAL DAILY
Status: DISCONTINUED | OUTPATIENT
Start: 2020-12-01 | End: 2020-11-30 | Stop reason: HOSPADM

## 2020-11-30 RX ORDER — TORSEMIDE 20 MG/1
60 TABLET ORAL DAILY
Qty: 90 TABLET | Refills: 0 | Status: SHIPPED | OUTPATIENT
Start: 2020-12-01 | End: 2020-12-31

## 2020-11-30 RX ORDER — AMLODIPINE BESYLATE 5 MG/1
5 TABLET ORAL DAILY
Qty: 30 TABLET | Refills: 0 | Status: SHIPPED | OUTPATIENT
Start: 2020-12-01 | End: 2020-12-31

## 2020-11-30 RX ORDER — ALBUTEROL SULFATE 90 UG/1
2 AEROSOL, METERED RESPIRATORY (INHALATION) EVERY 4 HOURS PRN
Qty: 1 INHALER | Refills: 0 | Status: SHIPPED | OUTPATIENT
Start: 2020-11-30

## 2020-11-30 RX ORDER — NITROGLYCERIN 0.4 MG/1
0.4 TABLET SUBLINGUAL
Qty: 25 TABLET | Refills: 0 | Status: SHIPPED | OUTPATIENT
Start: 2020-11-30

## 2020-11-30 RX ORDER — POTASSIUM CHLORIDE 20 MEQ/1
20 TABLET, EXTENDED RELEASE ORAL DAILY
Qty: 5 TABLET | Refills: 0 | Status: SHIPPED | OUTPATIENT
Start: 2020-12-01 | End: 2020-12-06

## 2020-11-30 RX ADMIN — FUROSEMIDE 60 MG: 40 TABLET ORAL at 08:00

## 2020-11-30 RX ADMIN — AMIODARONE HYDROCHLORIDE 200 MG: 200 TABLET ORAL at 07:59

## 2020-11-30 RX ADMIN — Medication 400 MG: at 08:00

## 2020-11-30 RX ADMIN — ATORVASTATIN CALCIUM 40 MG: 40 TABLET, FILM COATED ORAL at 08:00

## 2020-11-30 RX ADMIN — METOPROLOL SUCCINATE 100 MG: 100 TABLET, FILM COATED, EXTENDED RELEASE ORAL at 08:00

## 2020-11-30 RX ADMIN — POTASSIUM CHLORIDE 20 MEQ: 1500 TABLET, EXTENDED RELEASE ORAL at 08:00

## 2020-11-30 RX ADMIN — AMLODIPINE BESYLATE 5 MG: 5 TABLET ORAL at 08:00

## 2020-11-30 RX ADMIN — AMIODARONE HYDROCHLORIDE 200 MG: 200 TABLET ORAL at 11:29

## 2020-11-30 RX ADMIN — APIXABAN 5 MG: 5 TABLET, FILM COATED ORAL at 08:00

## 2020-11-30 RX ADMIN — ASPIRIN 81 MG CHEWABLE TABLET 81 MG: 81 TABLET CHEWABLE at 08:00

## 2020-12-01 LAB
ATRIAL RATE: 214 BPM
QRS AXIS: -49 DEGREES
QRSD INTERVAL: 106 MS
QT INTERVAL: 468 MS
QTC INTERVAL: 519 MS
T WAVE AXIS: 139 DEGREES
VENTRICULAR RATE: 74 BPM

## 2021-01-01 ENCOUNTER — SPECIALTY PHARMACY (OUTPATIENT)
Dept: PHARMACY | Facility: CLINIC | Age: 66
End: 2021-01-01

## 2021-01-01 ENCOUNTER — PATIENT MESSAGE (OUTPATIENT)
Dept: PHARMACY | Facility: CLINIC | Age: 66
End: 2021-01-01

## 2021-01-01 ENCOUNTER — HOSPITAL ENCOUNTER (EMERGENCY)
Facility: HOSPITAL | Age: 66
Discharge: HOME | End: 2021-10-07
Attending: STUDENT IN AN ORGANIZED HEALTH CARE EDUCATION/TRAINING PROGRAM
Payer: MEDICAID

## 2021-01-01 ENCOUNTER — PATIENT MESSAGE (OUTPATIENT)
Dept: RESEARCH | Facility: HOSPITAL | Age: 66
End: 2021-01-01

## 2021-01-01 VITALS
HEART RATE: 99 BPM | WEIGHT: 251.88 LBS | SYSTOLIC BLOOD PRESSURE: 161 MMHG | DIASTOLIC BLOOD PRESSURE: 81 MMHG | RESPIRATION RATE: 17 BRPM | BODY MASS INDEX: 35.13 KG/M2 | TEMPERATURE: 96 F | OXYGEN SATURATION: 96 %

## 2021-01-01 DIAGNOSIS — G89.3 CANCER RELATED PAIN: Primary | ICD-10-CM

## 2021-01-01 DIAGNOSIS — U07.1 COVID-19 VIRUS DETECTED: ICD-10-CM

## 2021-01-01 LAB
ALBUMIN SERPL BCP-MCNC: 3.7 G/DL (ref 3.5–5.2)
ALP SERPL-CCNC: 94 U/L (ref 55–135)
ALT SERPL W/O P-5'-P-CCNC: 14 U/L (ref 10–44)
ANION GAP SERPL CALC-SCNC: 10 MMOL/L (ref 8–16)
AST SERPL-CCNC: 12 U/L (ref 10–40)
BASOPHILS # BLD AUTO: 0.02 K/UL (ref 0–0.2)
BASOPHILS NFR BLD: 0.3 % (ref 0–1.9)
BILIRUB SERPL-MCNC: 1.7 MG/DL (ref 0.1–1)
BUN SERPL-MCNC: 21 MG/DL (ref 8–23)
CALCIUM SERPL-MCNC: 10 MG/DL (ref 8.7–10.5)
CHLORIDE SERPL-SCNC: 107 MMOL/L (ref 95–110)
CO2 SERPL-SCNC: 25 MMOL/L (ref 23–29)
CREAT SERPL-MCNC: 1.9 MG/DL (ref 0.5–1.4)
DIFFERENTIAL METHOD: ABNORMAL
EOSINOPHIL # BLD AUTO: 0.2 K/UL (ref 0–0.5)
EOSINOPHIL NFR BLD: 3.6 % (ref 0–8)
ERYTHROCYTE [DISTWIDTH] IN BLOOD BY AUTOMATED COUNT: 13.7 % (ref 11.5–14.5)
EST. GFR  (AFRICAN AMERICAN): 42 ML/MIN/1.73 M^2
EST. GFR  (NON AFRICAN AMERICAN): 36 ML/MIN/1.73 M^2
GLUCOSE SERPL-MCNC: 137 MG/DL (ref 70–110)
HCT VFR BLD AUTO: 37.9 % (ref 40–54)
HGB BLD-MCNC: 12.3 G/DL (ref 14–18)
IMM GRANULOCYTES # BLD AUTO: 0.03 K/UL (ref 0–0.04)
IMM GRANULOCYTES NFR BLD AUTO: 0.4 % (ref 0–0.5)
LYMPHOCYTES # BLD AUTO: 0.6 K/UL (ref 1–4.8)
LYMPHOCYTES NFR BLD: 8.7 % (ref 18–48)
MCH RBC QN AUTO: 32.8 PG (ref 27–31)
MCHC RBC AUTO-ENTMCNC: 32.5 G/DL (ref 32–36)
MCV RBC AUTO: 101 FL (ref 82–98)
MONOCYTES # BLD AUTO: 0.6 K/UL (ref 0.3–1)
MONOCYTES NFR BLD: 8.9 % (ref 4–15)
NEUTROPHILS # BLD AUTO: 5.3 K/UL (ref 1.8–7.7)
NEUTROPHILS NFR BLD: 78.1 % (ref 38–73)
NRBC BLD-RTO: 0 /100 WBC
PLATELET # BLD AUTO: 193 K/UL (ref 150–450)
PMV BLD AUTO: 8.5 FL (ref 9.2–12.9)
POTASSIUM SERPL-SCNC: 4.8 MMOL/L (ref 3.5–5.1)
PROT SERPL-MCNC: 7.1 G/DL (ref 6–8.4)
RBC # BLD AUTO: 3.75 M/UL (ref 4.6–6.2)
SODIUM SERPL-SCNC: 142 MMOL/L (ref 136–145)
WBC # BLD AUTO: 6.75 K/UL (ref 3.9–12.7)

## 2021-01-01 PROCEDURE — 96375 TX/PRO/DX INJ NEW DRUG ADDON: CPT

## 2021-01-01 PROCEDURE — 80053 COMPREHEN METABOLIC PANEL: CPT | Performed by: STUDENT IN AN ORGANIZED HEALTH CARE EDUCATION/TRAINING PROGRAM

## 2021-01-01 PROCEDURE — 96374 THER/PROPH/DIAG INJ IV PUSH: CPT

## 2021-01-01 PROCEDURE — 99285 EMERGENCY DEPT VISIT HI MDM: CPT | Mod: 25

## 2021-01-01 PROCEDURE — 63600175 PHARM REV CODE 636 W HCPCS: Performed by: STUDENT IN AN ORGANIZED HEALTH CARE EDUCATION/TRAINING PROGRAM

## 2021-01-01 PROCEDURE — 85025 COMPLETE CBC W/AUTO DIFF WBC: CPT | Performed by: STUDENT IN AN ORGANIZED HEALTH CARE EDUCATION/TRAINING PROGRAM

## 2021-01-01 PROCEDURE — 36415 COLL VENOUS BLD VENIPUNCTURE: CPT | Performed by: STUDENT IN AN ORGANIZED HEALTH CARE EDUCATION/TRAINING PROGRAM

## 2021-01-01 RX ORDER — MORPHINE SULFATE 4 MG/ML
8 INJECTION, SOLUTION INTRAMUSCULAR; INTRAVENOUS
Status: COMPLETED | OUTPATIENT
Start: 2021-01-01 | End: 2021-01-01

## 2021-01-01 RX ORDER — ONDANSETRON 2 MG/ML
8 INJECTION INTRAMUSCULAR; INTRAVENOUS
Status: COMPLETED | OUTPATIENT
Start: 2021-01-01 | End: 2021-01-01

## 2021-01-01 RX ORDER — HYDROMORPHONE HYDROCHLORIDE 1 MG/ML
1 INJECTION, SOLUTION INTRAMUSCULAR; INTRAVENOUS; SUBCUTANEOUS
Status: COMPLETED | OUTPATIENT
Start: 2021-01-01 | End: 2021-01-01

## 2021-01-01 RX ADMIN — ONDANSETRON 8 MG: 2 INJECTION INTRAMUSCULAR; INTRAVENOUS at 04:10

## 2021-01-01 RX ADMIN — MORPHINE SULFATE 8 MG: 4 INJECTION INTRAVENOUS at 04:10

## 2021-01-01 RX ADMIN — HYDROMORPHONE HYDROCHLORIDE 1 MG: 1 INJECTION, SOLUTION INTRAMUSCULAR; INTRAVENOUS; SUBCUTANEOUS at 06:10

## 2021-01-02 PROBLEM — D70.9 NEUTROPENIC: Status: ACTIVE | Noted: 2018-12-05

## 2021-01-02 PROBLEM — D84.9 COVID-19 IN IMMUNOCOMPROMISED PATIENT: Status: ACTIVE | Noted: 2021-01-01

## 2021-01-02 PROBLEM — U07.1 COVID-19 IN IMMUNOCOMPROMISED PATIENT: Status: ACTIVE | Noted: 2021-01-01

## 2021-01-02 PROBLEM — Z71.89 GOALS OF CARE, COUNSELING/DISCUSSION: Status: ACTIVE | Noted: 2021-01-01

## 2021-01-03 PROBLEM — U07.1 COVID-19: Status: ACTIVE | Noted: 2021-01-01

## 2021-04-05 PROBLEM — C76.0 PRIMARY SQUAMOUS CELL CARCINOMA OF HEAD AND NECK: Status: ACTIVE | Noted: 2021-01-01

## 2023-05-04 NOTE — TELEPHONE ENCOUNTER
----- Message from Astrid Rhodes sent at 11/4/2019 10:48 AM CST -----  Contact: pts wife   pts wife is calling to speak with the nurse pt received a text to be there for 8:00 am pt is working they are calling to confirm and they have a couple of questions they need to ask pt is asking how they go about staying at the [facility] pt is traveling from [place] can you please call pts wife at [phone] can you please call pts wife pt is traveling on the road for work right now     ROSA    no

## 2023-09-28 NOTE — PLAN OF CARE
1551 patient completed and tolerated infusion well, ambulatory pump connected to pt, port with +blood return, pump infusing with no issues. Patient watched CADD pump video via ipad. education reinforced to check pump daily to ensure infusing and troubleshooting. pt to RTC 1/3/20 @ 1545 for pump d/c. Patient verbalized understanding to all. Pt d/c, NAD noted   28-Sep-2023 00:00 28-Sep-2023 12:15

## (undated) DEVICE — TRAY FOLEY 16FR INFECTION CONT

## (undated) DEVICE — GAUZE SPONGE PEANUT STRL

## (undated) DEVICE — TRAY MINOR GEN SURG

## (undated) DEVICE — SUT VICRYL PLUS 3-0 SH 18IN

## (undated) DEVICE — EVACUATOR WOUND BULB 100CC

## (undated) DEVICE — SPONGE LAP 18X18 PREWASHED

## (undated) DEVICE — CORD BIPOLAR 12 FOOT

## (undated) DEVICE — NDL HYPO REG 25G X 1 1/2

## (undated) DEVICE — SEE MEDLINE ITEM 157194

## (undated) DEVICE — SUT SILK 3-0 RB-1 30IN BLK

## (undated) DEVICE — SUT LIGACLIP SMALL XTRA

## (undated) DEVICE — CLIP MED TICALL

## (undated) DEVICE — DRAPE STERI INSTRUMENT 1018

## (undated) DEVICE — WARMER DRAPE STERILE LF

## (undated) DEVICE — PROBE CATH TEMP 16 FRFOLEY 400

## (undated) DEVICE — APPLIER LIGACLIP LG 13IN

## (undated) DEVICE — SUT SILK 2-0 PS 18IN BLACK

## (undated) DEVICE — GOWN SURGICAL X-LARGE

## (undated) DEVICE — ELECTRODE REM PLYHSV RETURN 9

## (undated) DEVICE — SHEET EENT SPLIT

## (undated) DEVICE — BLADE SURG #15 CARBON STEEL

## (undated) DEVICE — COVER LIGHT HANDLE 80/CA

## (undated) DEVICE — ELECTRODE BLADE INSULATED 1 IN

## (undated) DEVICE — SUT 2-0 12-18IN SILK

## (undated) DEVICE — SEE MEDLINE ITEM 157128

## (undated) DEVICE — SUT COATED VICRYL 4/0 27IN

## (undated) DEVICE — CONTAINER SPECIMEN STRL 4OZ

## (undated) DEVICE — SUT 3-0 12-18IN SILK

## (undated) DEVICE — SEE MEDLINE ITEM 154981

## (undated) DEVICE — ADHESIVE DERMABOND ADVANCED

## (undated) DEVICE — SEE MEDLINE ITEM 152622

## (undated) DEVICE — STAPLER SKIN PROXIMATE WIDE

## (undated) DEVICE — Device

## (undated) DEVICE — HOOK LONE STAR BLUNT 12MM

## (undated) DEVICE — SKINMARKER & RULER REGULAR X-F